# Patient Record
Sex: MALE | Race: WHITE | Employment: FULL TIME | URBAN - METROPOLITAN AREA
[De-identification: names, ages, dates, MRNs, and addresses within clinical notes are randomized per-mention and may not be internally consistent; named-entity substitution may affect disease eponyms.]

---

## 2017-03-06 ENCOUNTER — OFFICE VISIT (OUTPATIENT)
Dept: FAMILY MEDICINE CLINIC | Age: 47
End: 2017-03-06

## 2017-03-06 VITALS
SYSTOLIC BLOOD PRESSURE: 130 MMHG | HEIGHT: 69 IN | DIASTOLIC BLOOD PRESSURE: 88 MMHG | BODY MASS INDEX: 33.03 KG/M2 | TEMPERATURE: 98.8 F | RESPIRATION RATE: 16 BRPM | OXYGEN SATURATION: 98 % | HEART RATE: 78 BPM | WEIGHT: 223 LBS

## 2017-03-06 DIAGNOSIS — E66.9 OBESITY, CLASS I, BMI 30-34.9: ICD-10-CM

## 2017-03-06 DIAGNOSIS — Z00.00 PHYSICAL EXAM: Primary | ICD-10-CM

## 2017-03-06 DIAGNOSIS — R06.09 DYSPNEA ON EXERTION: ICD-10-CM

## 2017-03-06 DIAGNOSIS — R73.9 ELEVATED BLOOD SUGAR: ICD-10-CM

## 2017-03-06 DIAGNOSIS — Z82.49 FAMILY HISTORY OF EARLY CAD: ICD-10-CM

## 2017-03-06 RX ORDER — BETAMETHASONE DIPROPIONATE 0.5 MG/G
CREAM TOPICAL
Refills: 2 | COMMUNITY
Start: 2017-02-23 | End: 2018-04-23

## 2017-03-06 RX ORDER — CLOBETASOL PROPIONATE 0.05 MG/G
GEL TOPICAL
Refills: 2 | COMMUNITY
Start: 2017-02-23 | End: 2018-04-23

## 2017-03-06 NOTE — PROGRESS NOTES
HISTORY OF PRESENT ILLNESS  Jory Ferreira is a 51 y.o. male. Blood pressure 130/88, pulse 78, temperature 98.8 °F (37.1 °C), temperature source Oral, resp. rate 16, height 5' 9\" (1.753 m), weight 223 lb (101.2 kg), SpO2 98 %. Body mass index is 32.93 kg/(m^2). Chief Complaint   Patient presents with    Complete Physical     annual visit      HPI   Jory Ferreira 51 y.o. male  presents to the office today for a complete physical. Bp at office today 130/88 with manual arm cuff recheck. Exertional SOB: Pt notes exertional SOB, occasional chest pain, and increased fatigue. He notes exertional SOB with just walking up the stairs or eating. Pt would like to complete cardiac work up to rule out any abnormality due to his family history of CAD in his father. 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 I have advised pt to complete echo stress test, pulmonary function tests, and cardiac CT scan for further evaluation. Health maintenance: Counseled pt to work on losing weight through diet and exercise. Current Outpatient Prescriptions   Medication Sig Dispense Refill    betamethasone dipropionate (DIPROSONE) 0.05 % topical cream APPLY CREAM TO AFFECTED AREA OF AXILLA 1 TO 2 TIMES DAILY PRN  2    clobetasol (TEMOVATE) 0.05 % topical gel APPLY GEL TO AFFECTED AREA OF SCALP ONCE TO TWICE DAILY PRN  2    atomoxetine (STRATTERA) 40 mg capsule Take 1 Cap by mouth daily.  30 Cap 0     No Known Allergies  Past Medical History:   Diagnosis Date    ADD (attention deficit disorder)     Arthritis     left knee    Preglaucoma of both eyes     Psoriasis 02/2017     Past Surgical History:   Procedure Laterality Date    HX ORTHOPAEDIC       Family History   Problem Relation Age of Onset   Jonny Holms Glaucoma Father     Hypertension Father     Stroke Sister      Social History   Substance Use Topics    Smoking status: Never Smoker    Smokeless tobacco: Never Used    Alcohol use 7.5 oz/week     15 Standard drinks or equivalent per week      Comment: 15 per week        Review of Systems   Constitutional: Positive for malaise/fatigue. Negative for chills, diaphoresis, fever and weight loss. HENT: Negative for congestion, ear discharge, ear pain, hearing loss, nosebleeds, sore throat and tinnitus. Eyes: Negative for blurred vision, double vision, photophobia, pain, discharge and redness. Respiratory: Positive for shortness of breath (exertional). Negative for cough, hemoptysis, sputum production, wheezing and stridor. Cardiovascular: Negative for chest pain, palpitations, orthopnea, claudication, leg swelling and PND. Gastrointestinal: Negative for abdominal pain, blood in stool, constipation, diarrhea, heartburn, melena, nausea and vomiting. Genitourinary: Negative for dysuria, flank pain, frequency, hematuria and urgency. Musculoskeletal: Negative. Negative for back pain, falls, joint pain, myalgias and neck pain. Skin: Negative for itching and rash. Neurological: Negative. Negative for dizziness, tingling, tremors, sensory change, speech change, focal weakness, seizures, loss of consciousness, weakness and headaches. Endo/Heme/Allergies: Negative for environmental allergies and polydipsia. Does not bruise/bleed easily. Psychiatric/Behavioral: Negative for depression, hallucinations, memory loss, substance abuse and suicidal ideas. The patient is not nervous/anxious and does not have insomnia. All other systems reviewed and are negative. Physical Exam   Constitutional: He is oriented to person, place, and time. He appears well-developed and well-nourished. No distress. HENT:   Head: Normocephalic and atraumatic. Right Ear: External ear normal.   Left Ear: External ear normal.   Nose: Nose normal.   Mouth/Throat: Oropharynx is clear and moist. No oropharyngeal exudate. Eyes: Conjunctivae and EOM are normal. Pupils are equal, round, and reactive to light. Right eye exhibits no discharge. Left eye exhibits no discharge. No scleral icterus. Neck: Normal range of motion. Neck supple. No JVD present. Carotid bruit is not present. No tracheal deviation present. No thyromegaly present. Cardiovascular: Normal rate, regular rhythm, normal heart sounds and intact distal pulses. Exam reveals no gallop and no friction rub. No murmur heard. Pulmonary/Chest: Effort normal and breath sounds normal. No stridor. No respiratory distress. He has no wheezes. He has no rales. He exhibits no tenderness. Abdominal: Soft. Bowel sounds are normal. He exhibits no distension and no mass. There is no tenderness. There is no rebound and no guarding. Genitourinary: Rectal exam shows guaiac negative stool. Prostate is enlarged. Genitourinary Comments: Prostate: smooth, symmetrical, no nodules   Musculoskeletal: Normal range of motion. He exhibits no edema or tenderness. Lymphadenopathy:     He has no cervical adenopathy. Neurological: He is alert and oriented to person, place, and time. He has normal reflexes. No cranial nerve deficit. He exhibits normal muscle tone. Coordination normal.   Skin: Skin is warm and dry. No rash noted. He is not diaphoretic. No erythema. No pallor. Psychiatric: He has a normal mood and affect. His behavior is normal. Judgment and thought content normal.   Nursing note and vitals reviewed. ASSESSMENT and PLAN  Keily Walters was seen today for complete physical.    Diagnoses and all orders for this visit:    Physical exam  -     METABOLIC PANEL, COMPREHENSIVE  -     CBC WITH AUTOMATED DIFF  -     TSH 3RD GENERATION  -     T4, FREE  -     VITAMIN D, 25 HYDROXY  -     URINALYSIS W/MICROSCOPIC  -     LIPID CASCADE  -     PSA W/ REFLX FREE PSA    Dyspnea on exertion  -     ECHO TTE STRESS EXERCISE TREADMILL COMP; Future  -     ECHO TTE STRESS EXRCSE COMP W OR WO CONTR; Future  -     PFT DLCO  -     PULMONARY FUNCTION TEST  - Unclear as to cause.  EKG completed today was inconclusive. Will complete stress echo, PFTs, and cardiac CT scan to rule out any abnormality. Obesity, Class I, BMI 30-34.9  I have reviewed/discussed the above normal BMI with the patient. I have recommended the following interventions: dietary management education, guidance, and counseling, dietary needs education, encourage exercise, feeding regime, lifestyle education regarding diet and monitor weight . The plan is as follows: I have counseled this patient on diet and exercise regimens. Family history of early CAD  -     AMB POC EKG ROUTINE W/ 12 LEADS, INTER & REP  -     CT HEART W/O CONT WITH CALCIUM; Future  -     ECHO TTE STRESS EXERCISE TREADMILL COMP; Future  -     ECHO TTE STRESS EXRCSE COMP W OR WO CONTR; Future  - See above       Follow-up Disposition:  Return in about 6 months (around 9/6/2017) for hypertension follow up, hyperlipidemia follow up. Medication risks/benefits/costs/interactions/alternatives discussed with patient. Advised patient to call back or return to office if symptoms worsen/change/persist.  If patient cannot reach us or should anything more severe/urgent arise he/she should proceed directly to the nearest emergency department. Discussed expected course/resolution/complications of diagnosis in detail with patient. Patient given a written after visit summary which includes her diagnoses, current medications and vitals. Patient expressed understanding with the diagnosis and plan. Written by jany Lemus, as dictated by Dasha Pathak M.D.    I have reviewed and agree with the above note and have made corrections where appropriate, Dr. Catracho Mcbride MD

## 2017-03-06 NOTE — MR AVS SNAPSHOT
Visit Information Date & Time Provider Department Dept. Phone Encounter #  
 3/6/2017  3:15 PM Emmanuel Mccormack  [place] [phone] [id number] Follow-up Instructions Return in about 6 months (around 9/6/2017) for hypertension follow up, hyperlipidemia follow up. Upcoming Health Maintenance Date Due DTaP/Tdap/Td series (1 - Tdap) 6/14/1990 COLONOSCOPY 9/8/2021 Allergies as of 3/6/2017  Review Complete On: 3/6/2017 By: Emmanuel Mccormack MD  
 No Known Allergies Current Immunizations  Reviewed on 2/11/2015 Name Date Influenza Vaccine (Quad) PF 10/28/2015 Not reviewed this visit You Were Diagnosed With   
  
 Codes Comments Physical exam    -  Primary ICD-10-CM: Z00.00 ICD-9-CM: V70.9 Dyspnea on exertion     ICD-10-CM: R06.09 
ICD-9-CM: 786.09 Obesity, Class I, BMI 30-34.9     ICD-10-CM: E66.9 ICD-9-CM: 278.00 Family history of early CAD     ICD-10-CM: Z82.49 
ICD-9-CM: V17.3 Vitals BP Pulse Temp Resp Height(growth percentile) Weight(growth percentile) 130/88 78 98.8 °F (37.1 °C) (Oral) 16 5' 9\" (1.753 m) 223 lb (101.2 kg) SpO2 BMI Smoking Status 98% 32.93 kg/m2 Never Smoker Vitals History BMI and BSA Data Body Mass Index Body Surface Area  
 32.93 kg/m 2 2.22 m 2 Preferred Pharmacy Pharmacy Name Phone [address] AT [facility] 91 [phone] Your Updated Medication List  
  
   
This list is accurate as of: 3/6/17  4:36 PM.  Always use your most recent med list.  
  
  
  
  
 atomoxetine 40 mg capsule Commonly known as:  STRATTERA Take 1 Cap by mouth daily. betamethasone dipropionate 0.05 % topical cream  
Commonly known as:  DIPROSONE  
APPLY CREAM TO AFFECTED AREA OF AXILLA 1 TO 2 TIMES DAILY PRN  
  
 clobetasol 0.05 % topical gel Commonly known as:  Caryle Killer APPLY GEL TO AFFECTED AREA OF SCALP ONCE TO TWICE DAILY PRN We Performed the Following AMB POC EKG ROUTINE W/ 12 LEADS, INTER & REP [69968 CPT(R)] CBC WITH AUTOMATED DIFF [25467 CPT(R)] LIPID CASCADE [id number] CPT(R)] METABOLIC PANEL, COMPREHENSIVE [22658 CPT(R)] PFT DLCO [[id number] Custom] PSA W/ REFLX FREE PSA [29905 CPT(R)] PULMONARY FUNCTION TEST [[id number] Custom] T4, FREE [id number] CPT(R)] TSH 3RD GENERATION [28245 CPT(R)] URINALYSIS W/MICROSCOPIC [00466 CPT(R)] VITAMIN D, 25 HYDROXY [id number] CPT(R)] Follow-up Instructions Return in about 6 months (around 9/6/2017) for hypertension follow up, hyperlipidemia follow up. To-Do List   
 03/06/2017 Imaging:  CT HEART W/O CONT WITH CALCIUM   
  
 03/06/2017 ECHO:  ECHO TTE STRESS EXERCISE TREADMILL COMP   
  
 03/06/2017 ECHO:  ECHO TTE STRESS EXRCSE COMP W OR WO CONTR Introducing [facility]! Dear Merlyn Mclean: Thank you for requesting a [employer] account. Our records indicate that you already have an active [employer] account. You can access your account anytime at [email or web address] Did you know that you can access your hospital and ER discharge instructions at any time in [employer]? You can also review all of your test results from your hospital stay or ER visit. Additional Information If you have questions, please visit the Frequently Asked Questions section of the [employer] website at [email or web address] Remember, [employer] is NOT to be used for urgent needs. For medical emergencies, dial 911. Now available from your iPhone and Android! Please provide this summary of care documentation to your next provider. Your primary care clinician is listed as Aneesh Pennington. If you have any questions after today's visit, please call [phone].

## 2017-03-06 NOTE — PROGRESS NOTES
Patient presents in office today for annual cpe     Chief Complaint   Patient presents with    Complete Physical     annual visit     1. Have you been to the ER, urgent care clinic since your last visit? Hospitalized since your last visit? No    2. Have you seen or consulted any other health care providers outside of the [address] [place] [address] since your last visit? Include any pap smears or colon screening.  No     PHQ 2 / 9, over the last two weeks 4/1/2015   Little interest or pleasure in doing things Not at all   Feeling down, depressed or hopeless Not at all   Total Score PHQ 2 0     Vitals:    03/06/17 1506   BP: 138/90   BP 1 Location: Right arm   BP Patient Position: Sitting   Pulse: 78   Resp: 16   Temp: 98.8 °F (37.1 °C)   TempSrc: Oral   SpO2: 98%   Weight: 223 lb (101.2 kg)   Height: 5' 9\" (1.753 m)

## 2017-03-09 ENCOUNTER — TELEPHONE (OUTPATIENT)
Dept: FAMILY MEDICINE CLINIC | Age: 47
End: 2017-03-09

## 2017-03-09 LAB
25(OH)D3+25(OH)D2 SERPL-MCNC: 38.5 NG/ML (ref 30–100)
ALBUMIN SERPL-MCNC: 4.7 G/DL (ref 3.5–5.5)
ALBUMIN/GLOB SERPL: 1.8 {RATIO} (ref 1.1–2.5)
ALP SERPL-CCNC: 57 IU/L (ref 39–117)
ALT SERPL-CCNC: 23 IU/L (ref 0–44)
APPEARANCE UR: CLEAR
AST SERPL-CCNC: 16 IU/L (ref 0–40)
BACTERIA #/AREA URNS HPF: NORMAL /[HPF]
BASOPHILS # BLD AUTO: 0 X10E3/UL (ref 0–0.2)
BASOPHILS NFR BLD AUTO: 1 %
BILIRUB SERPL-MCNC: 0.5 MG/DL (ref 0–1.2)
BILIRUB UR QL STRIP: NEGATIVE
BUN SERPL-MCNC: 27 MG/DL (ref 6–24)
BUN/CREAT SERPL: 23 (ref 9–20)
CALCIUM SERPL-MCNC: 10 MG/DL (ref 8.7–10.2)
CASTS URNS QL MICRO: NORMAL /LPF
CHLORIDE SERPL-SCNC: 104 MMOL/L (ref 96–106)
CHOLEST SERPL-MCNC: 192 MG/DL (ref 100–199)
CO2 SERPL-SCNC: 19 MMOL/L (ref 18–29)
COLOR UR: YELLOW
CREAT SERPL-MCNC: 1.15 MG/DL (ref 0.76–1.27)
EOSINOPHIL # BLD AUTO: 0.1 X10E3/UL (ref 0–0.4)
EOSINOPHIL NFR BLD AUTO: 1 %
EPI CELLS #/AREA URNS HPF: NORMAL /HPF
ERYTHROCYTE [DISTWIDTH] IN BLOOD BY AUTOMATED COUNT: 13.7 % (ref 12.3–15.4)
GLOBULIN SER CALC-MCNC: 2.6 G/DL (ref 1.5–4.5)
GLUCOSE SERPL-MCNC: 126 MG/DL (ref 65–99)
GLUCOSE UR QL: NEGATIVE
HCT VFR BLD AUTO: 48 % (ref 37.5–51)
HDL SERPL-SCNC: 36.6 UMOL/L
HDLC SERPL-MCNC: 42 MG/DL
HGB BLD-MCNC: 16.2 G/DL (ref 12.6–17.7)
HGB UR QL STRIP: NEGATIVE
IMM GRANULOCYTES # BLD: 0 X10E3/UL (ref 0–0.1)
IMM GRANULOCYTES NFR BLD: 0 %
INTERPRETATION, 910389: NORMAL
KETONES UR QL STRIP: NEGATIVE
LDL SERPL QN: 19.8 NM
LDL SERPL-SCNC: 1623 NMOL/L
LDL SMALL SERPL-SCNC: 1088 NMOL/L
LDLC SERPL CALC-MCNC: 104 MG/DL (ref 0–99)
LDLC/HDLC SERPL: 2.5 RATIO UNITS (ref 0–3.6)
LEUKOCYTE ESTERASE UR QL STRIP: NEGATIVE
LP-IR SCORE SERPL: 59
LYMPHOCYTES # BLD AUTO: 1.6 X10E3/UL (ref 0.7–3.1)
LYMPHOCYTES NFR BLD AUTO: 31 %
MCH RBC QN AUTO: 29.7 PG (ref 26.6–33)
MCHC RBC AUTO-ENTMCNC: 33.8 G/DL (ref 31.5–35.7)
MCV RBC AUTO: 88 FL (ref 79–97)
MICRO URNS: NORMAL
MICRO URNS: NORMAL
MONOCYTES # BLD AUTO: 0.5 X10E3/UL (ref 0.1–0.9)
MONOCYTES NFR BLD AUTO: 10 %
MUCOUS THREADS URNS QL MICRO: PRESENT
NEUTROPHILS # BLD AUTO: 2.9 X10E3/UL (ref 1.4–7)
NEUTROPHILS NFR BLD AUTO: 57 %
NITRITE UR QL STRIP: NEGATIVE
NONHDLC SERPL-MCNC: 150 MG/DL (ref 0–129)
PH UR STRIP: 6.5 [PH] (ref 5–7.5)
PLATELET # BLD AUTO: 194 X10E3/UL (ref 150–379)
POTASSIUM SERPL-SCNC: 4.2 MMOL/L (ref 3.5–5.2)
PROT SERPL-MCNC: 7.3 G/DL (ref 6–8.5)
PROT UR QL STRIP: NEGATIVE
PSA SERPL-MCNC: 1.1 NG/ML (ref 0–4)
RBC # BLD AUTO: 5.45 X10E6/UL (ref 4.14–5.8)
RBC #/AREA URNS HPF: NORMAL /HPF
REFLEX CRITERIA: NORMAL
SODIUM SERPL-SCNC: 146 MMOL/L (ref 134–144)
SP GR UR: 1.02 (ref 1–1.03)
T4 FREE SERPL-MCNC: 1.36 NG/DL (ref 0.82–1.77)
TRIGL SERPL-MCNC: 228 MG/DL (ref 0–149)
TSH SERPL DL<=0.005 MIU/L-ACNC: 1.08 UIU/ML (ref 0.45–4.5)
UROBILINOGEN UR STRIP-MCNC: 0.2 MG/DL (ref 0.2–1)
WBC # BLD AUTO: 5.1 X10E3/UL (ref 3.4–10.8)
WBC #/AREA URNS HPF: NORMAL /HPF

## 2017-03-09 NOTE — TELEPHONE ENCOUNTER
Sandra Shelby from [facility] [phone] CT heart Without was denied by his  per Sandra Shelby they will cover Stress test. Per Christi patient is aware I advised her I will call her back if you want to change it to Stress test

## 2017-03-10 ENCOUNTER — HOSPITAL ENCOUNTER (OUTPATIENT)
Dept: CT IMAGING | Age: 47
Discharge: HOME | End: 2017-03-10
Attending: FAMILY MEDICINE
Payer: SELF-PAY

## 2017-03-10 DIAGNOSIS — Z82.49 FAMILY HISTORY OF EARLY CAD: ICD-10-CM

## 2017-03-10 PROCEDURE — 75571 CT HRT W/O DYE W/CA TEST: CPT

## 2017-03-11 NOTE — PROGRESS NOTES
The coronary calcium in each vessel is as follows:    Left main coronary artery: 0  Left anterior descending coronary artery: 0  Left circumflex coronary artery: 0  Right coronary artery: 0  Posterior descending coronary artery: 0    Total calcium score: 0     Calcium score interpretation:  0-0 = No evidence of CAD  1-10 = Minimal evidence of CAD   = Mild evidence of CAD  101-400 = Moderate evidence of CAD  >400 = Extensive evidence of CAD    Your score of 0 places you in the 0 percentile rank. That means that nearly 100%  of men from 55to 48years old will have a higher calcium score than you. Chest CT findings: The visualized lungs are clear. The entire lung fields are  not imaged on this examination.             Impression            IMPRESSION: Total calcium score of 0.  No evidence of coronary artery disease

## 2017-03-13 ENCOUNTER — HOSPITAL ENCOUNTER (OUTPATIENT)
Dept: PULMONOLOGY | Age: 47
Discharge: HOME | End: 2017-03-13
Attending: FAMILY MEDICINE
Payer: COMMERCIAL

## 2017-03-13 DIAGNOSIS — R06.09 DYSPNEA ON EXERTION: ICD-10-CM

## 2017-03-13 PROCEDURE — 94729 DIFFUSING CAPACITY: CPT

## 2017-03-13 PROCEDURE — 94010 BREATHING CAPACITY TEST: CPT

## 2017-03-13 NOTE — CARDIO/PULMONARY
Reached patient at his given mobile number. Patient has received his coronary artery CT score of zero from Dr. Gali Garcia office. Patient had a few questions about the score which we discussed. Patient has no further questions at this time.

## 2017-03-16 ENCOUNTER — TELEPHONE (OUTPATIENT)
Dept: FAMILY MEDICINE CLINIC | Age: 47
End: 2017-03-16

## 2017-03-16 NOTE — PROGRESS NOTES
Mr. Arsen Ware look relatively good. Brody Spies Brody Spies Brody Spies But  Triglycerides, need to be less than 150,  i would work on diet and exercise. Fish oil tabs 1000 mg daily, and weight loss will help with this. 2) Mucus in urine is not a an issue when nitrates and leucocytes negative. ( this indicates infection)    3) coronary calcium score are is normal, this basically looks at hard calcium not soft calcium deposition. 4) LDL particle size is slightly elevated, needs to be less than 1300, again you need to make diet and exercise a priority. 5) I will order a regular stress test, lets see if this gets approved if, not we will need to send you to cardiology.     Any other questions let me know    Regards  H

## 2017-03-16 NOTE — TELEPHONE ENCOUNTER
Best call back # for patient: [phone] attempted to call patient no answer left him on his private VM that his labs are in my chart he can see Dr medina recommendation and if he has question to call us back

## 2017-03-16 NOTE — PROGRESS NOTES
Best call back # for patient: [phone] attempted to call patient no answer left him on his private VM that his labs are in my chart he can see Dr medina recommendation and if he has question to call us back

## 2017-03-16 NOTE — TELEPHONE ENCOUNTER
Patient is calling in regards to his most recent lab draw, patient would like a call back as soon as possible regarding these results.     Best call back # for patient: [phone]

## 2017-03-18 NOTE — PROCEDURES
[address], [address] [place] [address]   PULMONARY FUNCTION       Name:  Roly Ritter   MR#:  [id number]   :  1970   Account #:  [de-identified]    Date of Procedure:  2017   Date of Adm:  2017       INDICATIONS: Shortness of breath. FINDINGS: Spirometry: Normal FVC, normal FEV1, normal FEV1/FVC   ratio. Lung volume measurement: Normal TLC, normal VC, normal RV,   normal RV/TLC. Diffusion capacity: Normal diffusion capacity. IMPRESSION: Normal spirometry, lung volumes and diffusion capacity.         MD Gomez Negron / Charisse Hernández   D:  2017   16:20   T:  2017   21:39   Job #:  [id number]

## 2017-03-20 ENCOUNTER — TELEPHONE (OUTPATIENT)
Dept: FAMILY MEDICINE CLINIC | Age: 47
End: 2017-03-20

## 2017-03-20 DIAGNOSIS — R06.00 DYSPNEA, UNSPECIFIED TYPE: Primary | ICD-10-CM

## 2017-03-21 NOTE — TELEPHONE ENCOUNTER
uche called me back Nuclear stress test wont be covered I advised Travis Madden just need regular stress test and reorder stress test cardiac per Travis Madden she will call patient and set up appointment with patient

## 2017-03-22 NOTE — PROGRESS NOTES
[phone] attempted to call patient no answer left message on his private VM that PFT is normal and he can see his results in my chart and to call us back if he needs anything

## 2017-03-23 LAB
EST. AVERAGE GLUCOSE BLD GHB EST-MCNC: 105 MG/DL
HBA1C MFR BLD: 5.3 % (ref 4.8–5.6)

## 2018-04-23 ENCOUNTER — OFFICE VISIT (OUTPATIENT)
Dept: FAMILY MEDICINE CLINIC | Age: 48
End: 2018-04-23

## 2018-04-23 VITALS
HEART RATE: 80 BPM | TEMPERATURE: 97.4 F | DIASTOLIC BLOOD PRESSURE: 82 MMHG | RESPIRATION RATE: 18 BRPM | BODY MASS INDEX: 31.99 KG/M2 | HEIGHT: 69 IN | WEIGHT: 216 LBS | OXYGEN SATURATION: 96 % | SYSTOLIC BLOOD PRESSURE: 124 MMHG

## 2018-04-23 DIAGNOSIS — F90.8 ATTENTION DEFICIT HYPERACTIVITY DISORDER (ADHD), OTHER TYPE: ICD-10-CM

## 2018-04-23 DIAGNOSIS — E66.9 OBESITY, CLASS I, BMI 30-34.9: ICD-10-CM

## 2018-04-23 DIAGNOSIS — Z00.00 PHYSICAL EXAM: Primary | ICD-10-CM

## 2018-04-23 DIAGNOSIS — M25.50 ARTHRALGIA, UNSPECIFIED JOINT: ICD-10-CM

## 2018-04-23 DIAGNOSIS — L40.9 PSORIASIS: ICD-10-CM

## 2018-04-23 DIAGNOSIS — R06.09 DYSPNEA ON EXERTION: ICD-10-CM

## 2018-04-23 DIAGNOSIS — Z82.49 FAMILY HISTORY OF PREMATURE CAD: ICD-10-CM

## 2018-04-23 RX ORDER — GLUCOSAM/CHONDRO/HERB 149/HYAL 750-100 MG
1 TABLET ORAL DAILY
COMMUNITY
End: 2020-11-29

## 2018-04-23 RX ORDER — ZINC GLUCONATE 10 MG
LOZENGE ORAL DAILY
COMMUNITY

## 2018-04-23 RX ORDER — CHOLECALCIFEROL (VITAMIN D3) 125 MCG
2000 CAPSULE ORAL
COMMUNITY

## 2018-04-23 NOTE — PATIENT INSTRUCTIONS

## 2018-04-23 NOTE — PROGRESS NOTES
HISTORY OF PRESENT ILLNESS  Bret Mathews is a 49 y.o. male. Blood pressure 124/82, pulse 80, temperature 97.4 °F (36.3 °C), temperature source Oral, resp. rate 18, height 5' 9\" (1.753 m), weight 216 lb (98 kg), SpO2 96 %. Body mass index is 31.9 kg/(m^2). Chief Complaint   Patient presents with    Complete Physical        HPI  Bret Mathews 49 y.o. male  presents to the office today for a complete physical.    Vitamin D deficiency: Pt's vitamin D levels were 38.5 on 03/08/17. Pt continues with Vitamin D 2,000units daily. Presumed stable, will assess levels today. Health maintenance: Pt states that he stopped exercising and dieting regularly about 3 months ago, but plans to start a new regimen as the weather warms up. He notes that he has low energy at all times and states that it makes it more difficult to exercise regularly. Pt would like to have a stress test done to rule out any cardiac issues. Will order a 2D echo to rule out any cardiac issues due to pt's family history of heart disease. Pt states that he is having bilateral elbow pain. His dermatologist diagnosed him with psoriasis, but has not started him on any treatment. Will check rheumatologic markers on the pt today to evaluate if he needs to see a rheumatologist.     Current Outpatient Prescriptions   Medication Sig Dispense Refill    cholecalciferol, vitamin D3, (VITAMIN D3) 2,000 unit tab Take  by mouth.  omega 3-DHA-EPA-fish oil 1,000 mg (120 mg-180 mg) capsule Take 1 Cap by mouth daily.  magnesium 250 mg tab Take  by mouth daily.  betamethasone dipropionate (DIPROSONE) 0.05 % topical cream APPLY CREAM TO AFFECTED AREA OF AXILLA 1 TO 2 TIMES DAILY PRN  2    clobetasol (TEMOVATE) 0.05 % topical gel APPLY GEL TO AFFECTED AREA OF SCALP ONCE TO TWICE DAILY PRN  2    atomoxetine (STRATTERA) 40 mg capsule Take 1 Cap by mouth daily.  30 Cap 0     No Known Allergies  Past Medical History:   Diagnosis Date    ADD (attention deficit disorder)     Arthritis     left knee    Preglaucoma of both eyes     Psoriasis 02/2017     Past Surgical History:   Procedure Laterality Date    HX ORTHOPAEDIC       Family History   Problem Relation Age of Onset   Manas Prior Glaucoma Father     Hypertension Father     Stroke Sister      Social History   Substance Use Topics    Smoking status: Never Smoker    Smokeless tobacco: Never Used    Alcohol use 7.5 oz/week     15 Standard drinks or equivalent per week      Comment: 2-3 drinks per day        Review of Systems   Constitutional: Positive for malaise/fatigue. Negative for chills, diaphoresis, fever and weight loss. HENT: Negative for congestion, ear discharge, ear pain, hearing loss, nosebleeds, sore throat and tinnitus. Eyes: Negative for blurred vision, double vision, photophobia, pain, discharge and redness. Respiratory: Negative for cough, hemoptysis, sputum production, shortness of breath, wheezing and stridor. Cardiovascular: Negative for chest pain, palpitations, orthopnea, claudication, leg swelling and PND. Gastrointestinal: Negative for abdominal pain, blood in stool, constipation, diarrhea, heartburn, melena, nausea and vomiting. Genitourinary: Negative for dysuria, flank pain, frequency, hematuria and urgency. Musculoskeletal: Negative for back pain, falls, joint pain, myalgias and neck pain. Skin: Negative for itching and rash. Neurological: Negative for dizziness, tingling, tremors, sensory change, speech change, focal weakness, seizures, loss of consciousness, weakness and headaches. Endo/Heme/Allergies: Negative for environmental allergies and polydipsia. Does not bruise/bleed easily. Psychiatric/Behavioral: Negative for depression, hallucinations, memory loss, substance abuse and suicidal ideas. The patient is not nervous/anxious and does not have insomnia. All other systems reviewed and are negative.       Physical Exam   Constitutional: He is oriented to person, place, and time. He appears well-developed and well-nourished. No distress. HENT:   Head: Normocephalic and atraumatic. Right Ear: External ear normal.   Left Ear: External ear normal.   Nose: Nose normal.   Mouth/Throat: Oropharynx is clear and moist. No oropharyngeal exudate. Eyes: Conjunctivae and EOM are normal. Pupils are equal, round, and reactive to light. Right eye exhibits no discharge. Left eye exhibits no discharge. No scleral icterus. Neck: Normal range of motion. Neck supple. No JVD present. No tracheal deviation present. No thyromegaly present. Cardiovascular: Normal rate, regular rhythm, normal heart sounds and intact distal pulses. Exam reveals no gallop and no friction rub. No murmur heard. Pulmonary/Chest: Effort normal and breath sounds normal. No stridor. He has no wheezes. He has no rales. He exhibits no tenderness. Abdominal: Soft. Bowel sounds are normal. He exhibits no distension and no mass. There is no tenderness. There is no rebound and no guarding. Genitourinary:   Genitourinary Comments: Prostate: enlarged, smooth, symmetrical, no nodules, guaiac negative. Musculoskeletal: Normal range of motion. He exhibits no edema or tenderness. Lymphadenopathy:     He has no cervical adenopathy. Neurological: He is alert and oriented to person, place, and time. He has normal reflexes. No cranial nerve deficit. He exhibits normal muscle tone. Coordination normal.   Skin: Skin is warm and dry. No rash noted. He is not diaphoretic. No erythema. No pallor. Psychiatric: He has a normal mood and affect. His behavior is normal. Judgment and thought content normal.   Nursing note and vitals reviewed. ASSESSMENT and PLAN  Diagnoses and all orders for this visit:    1.  Physical exam  -     CBC WITH AUTOMATED DIFF  -     METABOLIC PANEL, COMPREHENSIVE  -     LIPID PANEL  -     TSH 3RD GENERATION  -     T4, FREE  -     VITAMIN D, 25 HYDROXY  -     URINALYSIS W/MICROSCOPIC  -     PSA W/ REFLX FREE PSA  -     TESTOSTERONE, FREE & TOTAL    2. Family history of premature CAD        - Will order a 2D echo to rule out any cardiac issues due to pt's family history of heart disease. 3. Obesity, Class I, BMI 30-34.9        - I have reviewed/discussed the above normal BMI with the patient. I have recommended the following interventions: dietary management education, guidance, and counseling, encourage exercise and monitor weight . 4. Dyspnea on exertion  -     ECHO TTE STRESS EXERCISE TREADMILL COMP; Future  -     ECHO TTE STRESS EXRCSE COMP W OR WO CONTR; Future  - Will order a 2D echo to rule out any cardiac issues due to pt's family history of heart disease. 5. Psoriasis        - Advised pt to continue to follow up with his dermatologist.     6. Arthralgia, unspecified joint  -     RHEUMATOID FACTOR, QL  -     CYCLIC CITRUL PEPTIDE AB, IGG  -     SED RATE (ESR)  -     C REACTIVE PROTEIN, QT        - Will check rheumatologic markers on the pt today to evaluate if he needs to see a rheumatologist.     Follow-up Disposition:  Return in about 1 year (around 4/23/2019) for physical exam.   Medication risks/benefits/costs/interactions/alternatives discussed with patient. Advised patient to call back or return to office if symptoms worsen/change/persist.  If patient cannot reach us or should anything more severe/urgent arise he/she should proceed directly to the nearest emergency department. Discussed expected course/resolution/complications of diagnosis in detail with patient. Patient given a written after visit summary which includes her diagnoses, current medications and vitals. Patient expressed understanding with the diagnosis and plan. Written by jany Beltre, as dictated by Wyatt Hernandez M.D.   I have reviewed and agree with the above note and have made corrections where appropriate, Dr. Gerard Tyler MD

## 2018-04-23 NOTE — PROGRESS NOTES
Chief Complaint   Patient presents with    Complete Physical       Reviewed Record in preparation for visit and have obtained necessary documentation. Identified pt with two pt identifiers (Name @ )    Health Maintenance Due   Topic    DTaP/Tdap/Td series (1 - Tdap)    Influenza Age 5 to Adult          1. Have you been to the ER, urgent care clinic since your last visit? Hospitalized since your last visit? No    2. Have you seen or consulted any other health care providers outside of the [facility] since your last visit? Include any pap smears or colon screening.  No

## 2018-04-23 NOTE — MR AVS SNAPSHOT
[facility] 
 
 
 [address] 
[phone] Patient: Lauri Moyer MRN: [id number] NSO:9[birth year]80/[id number] Visit Information Date & Time Provider Department Dept. Phone Encounter #  
 4/23/2018  3:15 PM Robert Vargas  [facility] [phone] [id number] Follow-up Instructions Return in about 1 year (around 4/23/2019) for physical exam. Upcoming Health Maintenance Date Due DTaP/Tdap/Td series (1 - Tdap) 6/14/1990 Influenza Age 5 to Adult 8/1/2017 COLONOSCOPY 9/8/2021 Allergies as of 4/23/2018  Review Complete On: 4/23/2018 By: Robert Vargas MD  
 No Known Allergies Current Immunizations  Reviewed on 2/11/2015 Name Date Influenza Vaccine (Quad) PF 10/28/2015 Not reviewed this visit You Were Diagnosed With   
  
 Codes Comments Physical exam    -  Primary ICD-10-CM: Z00.00 ICD-9-CM: V70.9 Family history of premature CAD     ICD-10-CM: Z82.49 
ICD-9-CM: V17.3 Obesity, Class I, BMI 30-34.9     ICD-10-CM: E66.9 ICD-9-CM: 278.00 Dyspnea on exertion     ICD-10-CM: R06.09 
ICD-9-CM: 786.09   
 Psoriasis     ICD-10-CM: L40.9 ICD-9-CM: 696.1 Arthralgia, unspecified joint     ICD-10-CM: M25.50 ICD-9-CM: 719.40 Vitals BP Pulse Temp Resp Height(growth percentile) Weight(growth percentile) 124/82 (BP 1 Location: Right arm, BP Patient Position: Sitting) 80 97.4 °F (36.3 °C) (Oral) 18 5' 9\" (1.753 m) 216 lb (98 kg) SpO2 BMI Smoking Status 96% 31.9 kg/m2 Never Smoker Vitals History BMI and BSA Data Body Mass Index Body Surface Area 31.9 kg/m 2 2.18 m 2 Preferred Pharmacy Pharmacy Name Phone [address], [address] [place] [address] [phone] Your Updated Medication List  
  
   
 This list is accurate as of 4/23/18  4:28 PM.  Always use your most recent med list.  
  
  
  
  
 atomoxetine 40 mg capsule Commonly known as:  STRATTERA Take 1 Cap by mouth daily. betamethasone dipropionate 0.05 % topical cream  
Commonly known as:  DIPROSONE  
APPLY CREAM TO AFFECTED AREA OF AXILLA 1 TO 2 TIMES DAILY PRN  
  
 clobetasol 0.05 % topical gel Commonly known as:  TEMOVATE  
APPLY GEL TO AFFECTED AREA OF SCALP ONCE TO TWICE DAILY PRN  
  
 magnesium 250 mg Tab Take  by mouth daily. omega 3-DHA-EPA-fish oil 1,000 mg (120 mg-180 mg) capsule Take 1 Cap by mouth daily. VITAMIN D3 2,000 unit Tab Generic drug:  cholecalciferol (vitamin D3) Take  by mouth. We Performed the Following C REACTIVE PROTEIN, QT [21546 CPT(R)] CBC WITH AUTOMATED DIFF [15936 CPT(R)] [address], IGG [id number] CPT(R)] LIPID PANEL [99652 CPT(R)] METABOLIC PANEL, COMPREHENSIVE [26922 CPT(R)] PSA W/ REFLX FREE PSA [42050 CPT(R)] RHEUMATOID FACTOR, QL [id number] CPT(R)] SED RATE (ESR) B884172 CPT(R)] T4, FREE [id number] CPT(R)] TESTOSTERONE, FREE & TOTAL [03346 CPT(R)] TSH 3RD GENERATION [06166 CPT(R)] URINALYSIS W/MICROSCOPIC [41941 CPT(R)] VITAMIN D, 25 HYDROXY [id number] CPT(R)] Follow-up Instructions Return in about 1 year (around 4/23/2019) for physical exam. To-Do List   
 04/23/2018 ECHO:  ECHO TTE STRESS EXERCISE TREADMILL COMP   
  
 04/23/2018 ECHO:  ECHO TTE STRESS EXRCSE COMP W OR WO CONTR Patient Instructions Well Visit, Ages 25 to 48: Care Instructions Your Care Instructions Physical exams can help you stay healthy. Your doctor has checked your overall health and may have suggested ways to take good care of yourself. He or she also may have recommended tests. At home, you can help prevent illness with healthy eating, regular exercise, and other steps. Follow-up care is a key part of your treatment and safety. Be sure to make and go to all appointments, and call your doctor if you are having problems. It's also a good idea to know your test results and keep a list of the medicines you take. How can you care for yourself at home? · Reach and stay at a healthy weight. This will lower your risk for many problems, such as obesity, diabetes, heart disease, and high blood pressure. · Get at least 30 minutes of physical activity on most days of the week. Walking is a good choice. You also may want to do other activities, such as running, swimming, cycling, or playing tennis or team sports. Discuss any changes in your exercise program with your doctor. · Do not smoke or allow others to smoke around you. If you need help quitting, talk to your doctor about stop-smoking programs and medicines. These can increase your chances of quitting for good. · Talk to your doctor about whether you have any risk factors for sexually transmitted infections (STIs). Having one sex partner (who does not have STIs and does not have sex with anyone else) is a good way to avoid these infections. · Use birth control if you do not want to have children at this time. Talk with your doctor about the choices available and what might be best for you. · Protect your skin from too much sun. When you're outdoors from 10 a.m. to 4 p.m., stay in the shade or cover up with clothing and a hat with a wide brim. Wear sunglasses that block UV rays. Even when it's cloudy, put broad-spectrum sunscreen (SPF 30 or higher) on any exposed skin. · See a dentist one or two times a year for checkups and to have your teeth cleaned. · Wear a seat belt in the car. · Drink alcohol in moderation, if at all. That means no more than 2 drinks a day for men and 1 drink a day for women. Follow your doctor's advice about when to have certain tests. These tests can spot problems early. For everyone · Cholesterol. Have the fat (cholesterol) in your blood tested after age 21. Your doctor will tell you how often to have this done based on your age, family history, or other things that can increase your risk for heart disease. · Blood pressure. Have your blood pressure checked during a routine doctor visit. Your doctor will tell you how often to check your blood pressure based on your age, your blood pressure results, and other factors. · Vision. Talk with your doctor about how often to have a glaucoma test. 
· Diabetes. Ask your doctor whether you should have tests for diabetes. · Colon cancer. Have a test for colon cancer at age 48. You may have one of several tests. If you are younger than 48, you may need a test earlier if you have any risk factors. Risk factors include whether you already had a precancerous polyp removed from your colon or whether your parent, brother, sister, or child has had colon cancer. For women · Breast exam and mammogram. Talk to your doctor about when you should have a clinical breast exam and a mammogram. Medical experts differ on whether and how often women under 50 should have these tests. Your doctor can help you decide what is right for you. · Pap test and pelvic exam. Begin Pap tests at age 24. A Pap test is the best way to find cervical cancer. The test often is part of a pelvic exam. Ask how often to have this test. 
· Tests for sexually transmitted infections (STIs). Ask whether you should have tests for STIs. You may be at risk if you have sex with more than one person, especially if your partners do not wear condoms. For men · Tests for sexually transmitted infections (STIs). Ask whether you should have tests for STIs. You may be at risk if you have sex with more than one person, especially if you do not wear a condom. · Testicular cancer exam. Ask your doctor whether you should check your testicles regularly. · Prostate exam. Talk to your doctor about whether you should have a blood test (called a PSA test) for prostate cancer. Experts differ on whether and when men should have this test. Some experts suggest it if you are older than 39 and are -American or have a father or brother who got prostate cancer when he was younger than 72. When should you call for help? Watch closely for changes in your health, and be sure to contact your doctor if you have any problems or symptoms that concern you. Where can you learn more? Go to http://catalina-zahraa.info/. Enter P072 in the search box to learn more about \"Well Visit, Ages 25 to 48: Care Instructions. \" Current as of: May 12, 2017 Content Version: 11.4 © 4656-9385 [email or web address]. Care instructions adapted under license by [employer] (which disclaims liability or warranty for this information). If you have questions about a medical condition or this instruction, always ask your healthcare professional. [place] [address] any warranty or liability for your use of this information. Introducing [facility]! Dear Brandi Veronica: Thank you for requesting a [employer] account. Our records indicate that you already have an active [employer] account. You can access your account anytime at [email or web address] Did you know that you can access your hospital and ER discharge instructions at any time in [employer]? You can also review all of your test results from your hospital stay or ER visit. Additional Information If you have questions, please visit the Frequently Asked Questions section of the [employer] website at [email or web address] Remember, [employer] is NOT to be used for urgent needs. For medical emergencies, dial 911. Now available from your iPhone and Android! Please provide this summary of care documentation to your next provider. Your primary care clinician is listed as Vanessa Bernard. If you have any questions after today's visit, please call [phone].

## 2018-05-11 ENCOUNTER — TELEPHONE (OUTPATIENT)
Dept: FAMILY MEDICINE CLINIC | Age: 48
End: 2018-05-11

## 2018-05-11 ENCOUNTER — HOSPITAL ENCOUNTER (OUTPATIENT)
Dept: NON INVASIVE DIAGNOSTICS | Age: 48
Discharge: HOME | End: 2018-05-11
Attending: FAMILY MEDICINE
Payer: COMMERCIAL

## 2018-05-11 DIAGNOSIS — R06.09 DYSPNEA ON EXERTION: ICD-10-CM

## 2018-05-11 LAB
ATTENDING PHYSICIAN, CST07: NORMAL
DIAGNOSIS, 93000: NORMAL
DUKE TM SCORE RESULT, CST14: NORMAL
DUKE TREADMILL SCORE, CST13: NORMAL
ECG INTERP BEFORE EX, CST11: NORMAL
ECG INTERP DURING EX, CST12: NORMAL
FUNCTIONAL CAPACITY, CST17: NORMAL
KNOWN CARDIAC CONDITION, CST08: NORMAL
MAX. DIASTOLIC BP, CST04: 78 MMHG
MAX. HEART RATE, CST05: 157 BPM
MAX. SYSTOLIC BP, CST03: 172 MMHG
OVERALL BP RESPONSE TO EXERCISE, CST16: NORMAL
OVERALL HR RESPONSE TO EXERCISE, CST15: NORMAL
PEAK EX METS, CST10: 13.4 METS
PROTOCOL NAME, CST01: NORMAL
TEST INDICATION, CST09: NORMAL

## 2018-05-11 PROCEDURE — 93351 STRESS TTE COMPLETE: CPT

## 2018-05-11 NOTE — TELEPHONE ENCOUNTER
Patient called had CPE 4/23/2018 and wanted to make sure for his labs hemoglobin a1c was included per Dr. Jocelyn leo to order A1c and add to lab order patient aware and understand

## 2018-05-11 NOTE — PROGRESS NOTES
Diagnosis    EF 60% Normal   Normal stress echo     Confirmed by Teresa Lua MD. ([id number]),  Nico Osuna ([id number]) on   5/11/2018 11:25:19 AM         Test indication  Dyspnea with Exercise    Functional capacity  Normal    ECG Interp. Before Exercise  Normal    ECG Interp. During Exercise  none    Overall HR response to exercise  Target Heart Rate:146    Overall BP response to exercise  normal resting BP - appropriate response    Max. Systolic BP mmHg 855    Max. Diastolic BP mmHg 78    Max.  Heart rate     Han treadmill score      Han TM score result      Peak Ex METs METS 13.4    Protocol name  BINH     Known cardiac condition      Attending physician  SILVANA MOSQUEDA    Resulting Agency  [facility] [place] [facility]

## 2018-05-24 LAB
APPEARANCE UR: CLEAR
BACTERIA #/AREA URNS HPF: NORMAL /[HPF]
BILIRUB UR QL STRIP: NEGATIVE
CASTS URNS QL MICRO: NORMAL /LPF
COLOR UR: YELLOW
EPI CELLS #/AREA URNS HPF: NORMAL /HPF
GLUCOSE UR QL: NEGATIVE
HGB UR QL STRIP: NEGATIVE
KETONES UR QL STRIP: NEGATIVE
LEUKOCYTE ESTERASE UR QL STRIP: NEGATIVE
MICRO URNS: NORMAL
MICRO URNS: NORMAL
MUCOUS THREADS URNS QL MICRO: PRESENT
NITRITE UR QL STRIP: NEGATIVE
PH UR STRIP: 5.5 [PH] (ref 5–7.5)
PROT UR QL STRIP: NEGATIVE
RBC #/AREA URNS HPF: NORMAL /HPF
SP GR UR: 1.02 (ref 1–1.03)
UROBILINOGEN UR STRIP-MCNC: 0.2 MG/DL (ref 0.2–1)
WBC #/AREA URNS HPF: NORMAL /HPF

## 2018-05-26 LAB
25(OH)D3+25(OH)D2 SERPL-MCNC: 41 NG/ML (ref 30–100)
ALBUMIN SERPL-MCNC: 4.7 G/DL (ref 3.5–5.5)
ALBUMIN/GLOB SERPL: 2.2 {RATIO} (ref 1.2–2.2)
ALP SERPL-CCNC: 61 IU/L (ref 39–117)
ALT SERPL-CCNC: 38 IU/L (ref 0–44)
AST SERPL-CCNC: 25 IU/L (ref 0–40)
BASOPHILS # BLD AUTO: 0 X10E3/UL (ref 0–0.2)
BASOPHILS NFR BLD AUTO: 0 %
BILIRUB SERPL-MCNC: 0.8 MG/DL (ref 0–1.2)
BUN SERPL-MCNC: 24 MG/DL (ref 6–24)
BUN/CREAT SERPL: 25 (ref 9–20)
CALCIUM SERPL-MCNC: 9.3 MG/DL (ref 8.7–10.2)
CCP IGA+IGG SERPL IA-ACNC: 6 UNITS (ref 0–19)
CHLORIDE SERPL-SCNC: 103 MMOL/L (ref 96–106)
CHOLEST SERPL-MCNC: 226 MG/DL (ref 100–199)
CO2 SERPL-SCNC: 21 MMOL/L (ref 18–29)
CREAT SERPL-MCNC: 0.96 MG/DL (ref 0.76–1.27)
CRP SERPL-MCNC: 1.8 MG/L (ref 0–4.9)
EOSINOPHIL # BLD AUTO: 0.1 X10E3/UL (ref 0–0.4)
EOSINOPHIL NFR BLD AUTO: 2 %
ERYTHROCYTE [DISTWIDTH] IN BLOOD BY AUTOMATED COUNT: 13.6 % (ref 12.3–15.4)
ERYTHROCYTE [SEDIMENTATION RATE] IN BLOOD BY WESTERGREN METHOD: 4 MM/HR (ref 0–15)
EST. AVERAGE GLUCOSE BLD GHB EST-MCNC: 108 MG/DL
GFR SERPLBLD CREATININE-BSD FMLA CKD-EPI: 108 ML/MIN/1.73
GFR SERPLBLD CREATININE-BSD FMLA CKD-EPI: 93 ML/MIN/1.73
GLOBULIN SER CALC-MCNC: 2.1 G/DL (ref 1.5–4.5)
GLUCOSE SERPL-MCNC: 108 MG/DL (ref 65–99)
HBA1C MFR BLD: 5.4 % (ref 4.8–5.6)
HCT VFR BLD AUTO: 47.6 % (ref 37.5–51)
HDLC SERPL-MCNC: 49 MG/DL
HGB BLD-MCNC: 16.2 G/DL (ref 13–17.7)
IMM GRANULOCYTES # BLD: 0 X10E3/UL (ref 0–0.1)
IMM GRANULOCYTES NFR BLD: 0 %
INTERPRETATION, 910389: NORMAL
LDLC SERPL CALC-MCNC: 152 MG/DL (ref 0–99)
LYMPHOCYTES # BLD AUTO: 1.6 X10E3/UL (ref 0.7–3.1)
LYMPHOCYTES NFR BLD AUTO: 38 %
MCH RBC QN AUTO: 29.7 PG (ref 26.6–33)
MCHC RBC AUTO-ENTMCNC: 34 G/DL (ref 31.5–35.7)
MCV RBC AUTO: 87 FL (ref 79–97)
MONOCYTES # BLD AUTO: 0.3 X10E3/UL (ref 0.1–0.9)
MONOCYTES NFR BLD AUTO: 7 %
NEUTROPHILS # BLD AUTO: 2.3 X10E3/UL (ref 1.4–7)
NEUTROPHILS NFR BLD AUTO: 53 %
PLATELET # BLD AUTO: 178 X10E3/UL (ref 150–379)
POTASSIUM SERPL-SCNC: 4.1 MMOL/L (ref 3.5–5.2)
PROT SERPL-MCNC: 6.8 G/DL (ref 6–8.5)
PSA SERPL-MCNC: 1.4 NG/ML (ref 0–4)
RBC # BLD AUTO: 5.46 X10E6/UL (ref 4.14–5.8)
REFLEX CRITERIA: NORMAL
RHEUMATOID FACT SERPL-ACNC: <10 IU/ML (ref 0–13.9)
SODIUM SERPL-SCNC: 143 MMOL/L (ref 134–144)
T4 FREE SERPL-MCNC: 1.33 NG/DL (ref 0.82–1.77)
TESTOST FREE SERPL-MCNC: 11 PG/ML (ref 6.8–21.5)
TESTOST SERPL-MCNC: 476 NG/DL (ref 264–916)
TRIGL SERPL-MCNC: 123 MG/DL (ref 0–149)
TSH SERPL DL<=0.005 MIU/L-ACNC: 1.55 UIU/ML (ref 0.45–4.5)
VLDLC SERPL CALC-MCNC: 25 MG/DL (ref 5–40)
WBC # BLD AUTO: 4.3 X10E3/UL (ref 3.4–10.8)

## 2018-05-29 NOTE — PROGRESS NOTES
Inform pt to go to my chart to see results and recommendations    Mr. Madeleine Ramos concerned about your cholesterol the LDL has gone up  You need to work in exercise, weight loss and decrease the fats in your diet  We need to recheck the cholesterol in 3 months to make sure you are having progress. Any family history of heart disease? Please let me know    Mediterranean diet: Choose this heart-healthy diet option  The Mediterranean diet is a heart-healthy eating plan combining elements of Mediterranean-style cooking. Here's how to adopt the Mediterranean diet. If you're looking for a heart-healthy eating plan, the Mediterranean diet might be right for you. The Mediterranean diet incorporates the basics of healthy eating  plus a splash of flavorful olive oil and perhaps a glass of red wine  among other components characterizing the traditional cooking style of countries bordering the [facility]. Most healthy diets include fruits, vegetables, fish and whole grains, and limit unhealthy fats. While these parts of a healthy diet remain tried-and-true, subtle variations or differences in proportions of certain foods may make a difference in your risk of heart disease. Benefits of the [address] has shown that the traditional Mediterranean diet reduces the risk of heart disease. In fact, a recent analysis of more than 1.5 million healthy adults demonstrated that following a Mediterranean diet was associated with a reduced risk of overall and cardiovascular mortality, a reduced incidence of cancer and cancer mortality, and a reduced incidence of Parkinson's and Alzheimer's diseases. For this reason, most if not all major scientific organizations encourage healthy adults to adapt a style of eating like that of the [address] for prevention of major chronic diseases.        Key components of the Mediterranean diet  The Mediterranean diet emphasizes:   Getting plenty of exercise Eating primarily plant-based foods, such as fruits and vegetables, whole grains, legumes and nuts   Replacing butter with healthy fats such as olive oil and canola oil   Using herbs and spices instead of salt to flavor foods   Limiting red meat to no more than a few times a month   Eating fish and poultry at least twice a week   Drinking red wine in moderation (optional)   The diet also recognizes the importance of enjoying meals with family and friends. Fruits, vegetables, nuts and grains  The Mediterranean diet traditionally includes fruits, vegetables, pasta and rice. For example, residents of [facility] eat very little red meat and average nine servings a day of antioxidant-rich fruits and vegetables. The Mediterranean diet has been associated with a lower level of oxidized low-density lipoprotein (LDL) cholesterol  the \"bad\" cholesterol that's more likely to build up deposits in your arteries. Nuts are another part of a healthy Mediterranean diet. Nuts are high in fat (approximately 80 percent of their calories come from fat), but most of the fat is not saturated. Because nuts are high in calories, they should not be eaten in large amounts  generally no more than a handful a day. For the best nutrition, avoid candied or honey-roasted and heavily salted nuts. Grains in the [address] [place] [address] region are typically whole grain and usually contain very few unhealthy trans fats, and bread is an important part of the diet there. However, throughout the [address] [place] [address] region, bread is eaten plain or dipped in olive oil  not eaten with butter or margarines, which contain saturated or trans fats.

## 2018-07-06 ENCOUNTER — OFFICE VISIT (OUTPATIENT)
Dept: FAMILY MEDICINE CLINIC | Age: 48
End: 2018-07-06

## 2018-07-06 VITALS
OXYGEN SATURATION: 98 % | TEMPERATURE: 98.4 F | HEART RATE: 75 BPM | WEIGHT: 225.6 LBS | DIASTOLIC BLOOD PRESSURE: 80 MMHG | RESPIRATION RATE: 18 BRPM | HEIGHT: 69 IN | SYSTOLIC BLOOD PRESSURE: 128 MMHG | BODY MASS INDEX: 33.41 KG/M2

## 2018-07-06 DIAGNOSIS — R53.83 FATIGUE, UNSPECIFIED TYPE: ICD-10-CM
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
## 2018-07-06 RX ORDER — DOXYCYCLINE 100 MG/1
100 TABLET ORAL 2 TIMES DAILY
Qty: 42 TAB | Refills: 0 | Status: SHIPPED | OUTPATIENT
Start: 2018-07-06 | End: 2018-07-27

## 2018-07-06 NOTE — PROGRESS NOTES
Kofi Cai is a 51 y.o. male  Chief Complaint   Patient presents with    Insect Bite     Pt reports tick bite x 2 weeks. Reports redness at the site     1. Have you been to the ER, urgent care clinic since your last visit? Hospitalized since your last visit? No    2. Have you seen or consulted any other health care providers outside of the [facility] since your last visit? Include any pap smears or colon screening.  No

## 2018-07-06 NOTE — PROGRESS NOTES
Patient Name: Isaac San   MRN: [id number]    Robbin Hayden is a 51 y.o. male who presents with the following:     Patient states he has had 2 week history of fatigue, sore throat, constipation/diarrhea, and occasional productive cough. Was in the woods about 2 weeks ago in [place] and believes he got several bug bites at that time. His wife removed a tick from his back the other day and the area was noted to be red. Patient denies any history of fever, weight loss, history of Lyme disease, joint pains. Review of Systems   Constitutional: Positive for malaise/fatigue. Negative for fever and weight loss. HENT: Positive for sore throat. Respiratory: Positive for cough. Negative for hemoptysis, shortness of breath and wheezing. Cardiovascular: Negative for chest pain, palpitations, leg swelling and PND. Gastrointestinal: Positive for constipation and diarrhea. Negative for abdominal pain, nausea and vomiting. The patient's medications, allergies, past medical history, surgical history, family history and social history were reviewed and updated where appropriate. Prior to Admission medications    Medication Sig Start Date End Date Taking? Authorizing Provider   cholecalciferol, vitamin D3, (VITAMIN D3) 2,000 unit tab Take  by mouth. Yes Historical Provider   omega 3-DHA-EPA-fish oil 1,000 mg (120 mg-180 mg) capsule Take 1 Cap by mouth daily. Yes Historical Provider   magnesium 250 mg tab Take  by mouth daily. Yes Historical Provider       No Known Allergies        OBJECTIVE    Visit Vitals    /80 (BP 1 Location: Left arm, BP Patient Position: Sitting)    Pulse 75    Temp 98.4 °F (36.9 °C) (Oral)    Resp 18    Ht 5' 9\" (1.753 m)    Wt 225 lb 9.6 oz (102.3 kg)    SpO2 98%    BMI 33.32 kg/m2       Physical Exam   Constitutional: He is oriented to person, place, and time and well-developed, well-nourished, and in no distress. No distress.    HENT:   Head: Normocephalic and atraumatic. Right Ear: Tympanic membrane is not perforated and not erythematous. No middle ear effusion. No decreased hearing is noted. Left Ear: Tympanic membrane is not perforated and not erythematous. No middle ear effusion. No decreased hearing is noted. Nose: Nose normal. Right sinus exhibits no maxillary sinus tenderness and no frontal sinus tenderness. Left sinus exhibits no maxillary sinus tenderness and no frontal sinus tenderness. Mouth/Throat: Uvula is midline, oropharynx is clear and moist and mucous membranes are normal.   Neck: Normal range of motion. Neck supple. Cardiovascular: Normal rate, regular rhythm and normal heart sounds. Exam reveals no gallop and no friction rub. No murmur heard. Pulmonary/Chest: Effort normal and breath sounds normal. No respiratory distress. He has no wheezes. Lymphadenopathy:     He has no cervical adenopathy. Neurological: He is alert and oriented to person, place, and time. Skin: Skin is warm and dry. He is not diaphoretic. There is erythema. Erythematous asymmetric patch along right upper back. Area explored for any remainder tick but only notable for a scab. Psychiatric: Mood, memory, affect and judgment normal.   Nursing note and vitals reviewed. ASSESSMENT AND PLAN  Inez Jo is a 51 y.o. male who presents today for:    1. Tick bite, initial encounter  Given patient's ongoing symptoms and known tick exposure, will start treatment for Lyme disease. Discussed with patient risks of lab testing at this point including false positives. Encourage patient to call back in the next week if symptoms do not appear to be improving despite starting antibiotics. Encourage patient to avoid direct sunlight while on doxycycline and to use sun protection. Review to take with food and probiotic/yogurt daily while on abx.   - doxycycline (ADOXA) 100 mg tablet; Take 1 Tab by mouth two (2) times a day for 21 days.   Dispense: 42 Tab; Refill: 0    2. Fatigue, unspecified type  Symptoms above may be related to tick borne illness versus viral URI/GI syndrome. Encourage supportive care. Reviewed signs and symptoms that would indicate a worsening medical condition which would require immediate evaluation and treatment; patient expressed understanding of plan. There are no discontinued medications. Follow-up Disposition:  Return if symptoms worsen or fail to improve. Medication risks/benefits/costs/interactions/alternatives discussed with patient. Advised patient to call back or return to office if symptoms worsen/change/persist. If patient cannot reach us or should anything more severe/urgent arise he/she should proceed directly to the nearest emergency department. Discussed expected course/resolution/complications of diagnosis in detail with patient. Patient given a written after visit summary which includes his/her diagnoses, current medications and vitals. Patient expressed understanding with the diagnosis and plan.      Loida Canales M.D.

## 2018-07-06 NOTE — MR AVS SNAPSHOT
[address] [facility] 
 
 
 [address] 
[phone] Patient: Allen Damon MRN: [id number] UOH:2[birth year]61/[birth year]49 Visit Information Date & Time Provider Department Dept. Phone Encounter #  
 7/6/2018  1:15 PM Breanna Grant  [facility] [phone] [id number] Follow-up Instructions Return if symptoms worsen or fail to improve. Your Appointments 10/1/2018  9:00 AM  
New Patient with Jorge L Brewster MD  
[address] ([facility] Appt Note: np est pcp // $0cp LMJ 06/1/2018  
 [address] [facility] [address] [facility] [address]  
[phone]  
  
   
 [address] [facility] [address] [address] Upcoming Health Maintenance Date Due DTaP/Tdap/Td series (1 - Tdap) 6/14/1990 Influenza Age 5 to Adult 8/1/2018 COLONOSCOPY 9/8/2021 Allergies as of 7/6/2018  Review Complete On: 7/6/2018 By: Mark Kearns No Known Allergies Current Immunizations  Reviewed on 2/11/2015 Name Date Influenza Vaccine (Quad) PF 10/28/2015 Not reviewed this visit Vitals BP Pulse Temp Resp Height(growth percentile) Weight(growth percentile) 128/80 (BP 1 Location: Left arm, BP Patient Position: Sitting) 75 98.4 °F (36.9 °C) (Oral) 18 5' 9\" (1.753 m) 225 lb 9.6 oz (102.3 kg) SpO2 BMI Smoking Status 98% 33.32 kg/m2 Never Smoker Vitals History BMI and BSA Data Body Mass Index Body Surface Area  
 33.32 kg/m 2 2.23 m 2 Preferred Pharmacy Pharmacy Name Phone [address] [address] [facility] [address] [phone] Your Updated Medication List  
  
   
This list is accurate as of 7/6/18  1:48 PM.  Always use your most recent med list.  
  
  
  
  
 doxycycline 100 mg tablet Commonly known as:  ADOXA Take 1 Tab by mouth two (2) times a day for 21 days. magnesium 250 mg Tab Take  by mouth daily. omega 3-DHA-EPA-fish oil 1,000 mg (120 mg-180 mg) capsule Take 1 Cap by mouth daily. VITAMIN D3 2,000 unit Tab Generic drug:  cholecalciferol (vitamin D3) Take  by mouth. Prescriptions Sent to Pharmacy Refills  
 doxycycline (ADOXA) 100 mg tablet 0 Sig: Take 1 Tab by mouth two (2) times a day for 21 days. Class: Normal  
 Pharmacy: [facility] [address] [place] [address] 95 AT [address] #: [phone] Route: Oral  
  
Follow-up Instructions Return if symptoms worsen or fail to improve. Patient Instructions Tick Bite: Care Instructions Your Care Instructions Ticks are small spiderlike animals. They bite to fasten themselves onto your skin and feed on your blood. Ticks can carry diseases. But most ticks do not carry diseases, and most tick bites do not cause serious health problems. Some people may have an allergic reaction to a tick bite. This reaction may be mild, with symptoms like itching and swelling. In rare cases, a severe allergic reaction may occur. Most of the time, all you need to do for a tick bite is relieve any symptoms you may have. Follow-up care is a key part of your treatment and safety. Be sure to make and go to all appointments, and call your doctor if you are having problems. It's also a good idea to know your test results and keep a list of the medicines you take. How can you care for yourself at home? · Put ice or a cold pack on the bite for 15 to 20 minutes once an hour. Put a thin cloth between the ice and your skin. · Try an over-the-counter medicine to relieve itching, redness, swelling, and pain. Be safe with medicines. Read and follow all instructions on the label.  
¨ Take an antihistamine medicine, such as a nondrowsy one like loratadine (Claritin) or one that might make you sleepy like diphenhydramine (Benadryl). These medicines may help relieve itching, redness, and swelling. ¨ Use a spray of local anesthetic that contains benzocaine, such as Solarcaine. It may help relieve pain. If your skin reacts to the spray, stop using it. ¨ Put calamine lotion on the skin. It may help relieve itching. To avoid tick bites · Avoid ticks: 
¨ Learn where ticks are found in your community, and stay away from those areas if possible. ¨ Cover as much of your body as possible when you work or play in grassy or wooded areas. ¨ Use insect repellents, such as products containing DEET. You can spray them on your skin. ¨ Take steps to control ticks on your property if you live in an area where Lyme disease occurs. Clear leaves, brush, tall grasses, woodpiles, and stone fences from around your house and the edges of your yard or garden. This may help get rid of ticks. · When you come in from outdoors, check your body for ticks, including your groin, head, and underarms. The ticks may be about the size of a sesame seed. If no one else can help you check for ticks on your scalp, comb your hair with a fine-tooth comb. · If you find a tick, remove it quickly. Use tweezers to grasp the tick as close to its mouth (the part in your skin) as possible. Slowly pull the tick straight out-do not twist or yank-until its mouth releases from your skin. · Ticks can come into your house on clothing, outdoor gear, and pets. These ticks can fall off and attach to you. ¨ Check your clothing and outdoor gear. Remove any ticks you find. Then put your clothing in a clothes dryer on high heat for 1 hour to kill any ticks that might remain. ¨ Check your pets for ticks after they have been outdoors. · When hiking in the woods, carry a small dry jar or ziplock bag. If you find a tick on your body, remove the tick and put it in the jar or bag.  Store the container in the freezer so you can give it to your doctor if symptoms develop. The tick can be tested to learn whether it is carrying the bacteria that cause Lyme disease. When should you call for help? Call 911 anytime you think you may need emergency care. For example, call if: 
? · You have symptoms of a severe allergic reaction. These may include: 
¨ Sudden raised, red areas (hives) all over your body. ¨ Swelling of the throat, mouth, lips, or tongue. ¨ Trouble breathing. ¨ Passing out (losing consciousness). Or you may feel very lightheaded or suddenly feel weak, confused, or restless. ?Call your doctor now or seek immediate medical care if: 
? · You have signs of infection, such as: 
¨ Increased pain, swelling, warmth, or redness around the bite. ¨ Red streaks leading from the bite. ¨ Pus draining from the bite. ¨ A fever. ? Watch closely for changes in your health, and be sure to contact your doctor if: 
? · You develop a new rash. ? · You have joint pain. ? · You are very tired. ? · You have flu-like symptoms. ? · You have symptoms for more than 1 week. Where can you learn more? Go to http://catalina-zahraa.info/. Enter K854 in the search box to learn more about \"Tick Bite: Care Instructions. \" Current as of: March 20, 2017 Content Version: 11.4 © 3038-8987 [employer]. Care instructions adapted under license by [employer] (which disclaims liability or warranty for this information). If you have questions about a medical condition or this instruction, always ask your healthcare professional. [address] [place] [address] any warranty or liability for your use of this information. Introducing [facility] & HEALTH SERVICES! Dear Avtar Bucio: Thank you for requesting a [employer] account. Our records indicate that you already have an active [employer] account. You can access your account anytime at [email or web address] Did you know that you can access your hospital and ER discharge instructions at any time in ZeniMax? You can also review all of your test results from your hospital stay or ER visit. Additional Information If you have questions, please visit the Frequently Asked Questions section of the [employer] website at [email or web address] Remember, [employer] is NOT to be used for urgent needs. For medical emergencies, dial 911. Now available from your iPhone and Android! Please provide this summary of care documentation to your next provider. Your primary care clinician is listed as Jeremiah Woodall. If you have any questions after today's visit, please call [phone].

## 2018-07-06 NOTE — PATIENT INSTRUCTIONS
Tick Bite: Care Instructions  Your Care Instructions    Ticks are small spiderlike animals. They bite to fasten themselves onto your skin and feed on your blood. Ticks can carry diseases. But most ticks do not carry diseases, and most tick bites do not cause serious health problems. Some people may have an allergic reaction to a tick bite. This reaction may be mild, with symptoms like itching and swelling. In rare cases, a severe allergic reaction may occur. Most of the time, all you need to do for a tick bite is relieve any symptoms you may have. Follow-up care is a key part of your treatment and safety. Be sure to make and go to all appointments, and call your doctor if you are having problems. It's also a good idea to know your test results and keep a list of the medicines you take. How can you care for yourself at home? · Put ice or a cold pack on the bite for 15 to 20 minutes once an hour. Put a thin cloth between the ice and your skin. · Try an over-the-counter medicine to relieve itching, redness, swelling, and pain. Be safe with medicines. Read and follow all instructions on the label. ¨ Take an antihistamine medicine, such as a nondrowsy one like loratadine (Claritin) or one that might make you sleepy like diphenhydramine (Benadryl). These medicines may help relieve itching, redness, and swelling. ¨ Use a spray of local anesthetic that contains benzocaine, such as Solarcaine. It may help relieve pain. If your skin reacts to the spray, stop using it. ¨ Put calamine lotion on the skin. It may help relieve itching. To avoid tick bites  · Avoid ticks:  ¨ Learn where ticks are found in your community, and stay away from those areas if possible. ¨ Cover as much of your body as possible when you work or play in grassy or wooded areas. ¨ Use insect repellents, such as products containing DEET. You can spray them on your skin.   ¨ Take steps to control ticks on your property if you live in an area where Lyme disease occurs. Clear leaves, brush, tall grasses, woodpiles, and stone fences from around your house and the edges of your yard or garden. This may help get rid of ticks. · When you come in from outdoors, check your body for ticks, including your groin, head, and underarms. The ticks may be about the size of a sesame seed. If no one else can help you check for ticks on your scalp, comb your hair with a fine-tooth comb. · If you find a tick, remove it quickly. Use tweezers to grasp the tick as close to its mouth (the part in your skin) as possible. Slowly pull the tick straight out-do not twist or yank-until its mouth releases from your skin. · Ticks can come into your house on clothing, outdoor gear, and pets. These ticks can fall off and attach to you. ¨ Check your clothing and outdoor gear. Remove any ticks you find. Then put your clothing in a clothes dryer on high heat for 1 hour to kill any ticks that might remain. ¨ Check your pets for ticks after they have been outdoors. · When hiking in the woods, carry a small dry jar or ziplock bag. If you find a tick on your body, remove the tick and put it in the jar or bag. Store the container in the freezer so you can give it to your doctor if symptoms develop. The tick can be tested to learn whether it is carrying the bacteria that cause Lyme disease. When should you call for help? Call 911 anytime you think you may need emergency care. For example, call if:  ? · You have symptoms of a severe allergic reaction. These may include:  ¨ Sudden raised, red areas (hives) all over your body. ¨ Swelling of the throat, mouth, lips, or tongue. ¨ Trouble breathing. ¨ Passing out (losing consciousness). Or you may feel very lightheaded or suddenly feel weak, confused, or restless. ?Call your doctor now or seek immediate medical care if:  ? · You have signs of infection, such as:  ¨ Increased pain, swelling, warmth, or redness around the bite.   ¨ Red streaks leading from the bite. ¨ Pus draining from the bite. ¨ A fever. ? Watch closely for changes in your health, and be sure to contact your doctor if:  ? · You develop a new rash. ? · You have joint pain. ? · You are very tired. ? · You have flu-like symptoms. ? · You have symptoms for more than 1 week. Where can you learn more? Go to http://catalina-zahraa.info/. Enter Y477 in the search box to learn more about \"Tick Bite: Care Instructions. \"  Current as of: March 20, 2017  Content Version: 11.4  © 3962-0907 [employer]. Care instructions adapted under license by [employer] (which disclaims liability or warranty for this information). If you have questions about a medical condition or this instruction, always ask your healthcare professional. [address] any warranty or liability for your use of this information.

## 2018-08-22 ENCOUNTER — OFFICE VISIT (OUTPATIENT)
Dept: FAMILY MEDICINE CLINIC | Age: 48
End: 2018-08-22

## 2018-08-22 VITALS
TEMPERATURE: 97.2 F | DIASTOLIC BLOOD PRESSURE: 85 MMHG | BODY MASS INDEX: 32.58 KG/M2 | HEIGHT: 69 IN | WEIGHT: 220 LBS | OXYGEN SATURATION: 98 % | HEART RATE: 68 BPM | RESPIRATION RATE: 18 BRPM | SYSTOLIC BLOOD PRESSURE: 135 MMHG

## 2018-08-22 DIAGNOSIS — R41.3 MEMORY CHANGES: ICD-10-CM

## 2018-08-22 DIAGNOSIS — N52.9 ERECTILE DYSFUNCTION, UNSPECIFIED ERECTILE DYSFUNCTION TYPE: ICD-10-CM

## 2018-08-22 DIAGNOSIS — G47.9 SLEEP DISTURBANCE: Primary | ICD-10-CM

## 2018-08-22 RX ORDER — TADALAFIL 10 MG/1
10 TABLET ORAL
Qty: 8 TAB | Refills: 5 | Status: SHIPPED | OUTPATIENT
Start: 2018-08-22 | End: 2020-11-29

## 2018-08-22 NOTE — MR AVS SNAPSHOT
[address] 
[phone] Patient: Katty Chow MRN: [id number] IBM:[id number] Visit Information Date & Time Provider Department Dept. Phone Encounter #  
 8/22/2018  4:00 PM Lou Cerda, [address] [facility] [phone] [id number] Follow-up Instructions Return in about 8 months (around 4/24/2019) for physical exam.  
  
Your Appointments 10/1/2018  9:00 AM  
New Patient with Brunilda Workman MD  
[facility] [address] [facility]) Appt Note: np est pcp // $0cp LMJ 06/1/2018; referring doctor Dr. Molly Vernon; np est pcp // $0cp LMJ 06/1/2018 ref'd by Dr Noble Ny [facility] [phone]  
[address] [place] [phone] Upcoming Health Maintenance Date Due DTaP/Tdap/Td series (1 - Tdap) 6/14/1990 Influenza Age 5 to Adult 10/1/2018* COLONOSCOPY 9/8/2021 *Topic was postponed. The date shown is not the original due date. Allergies as of 8/22/2018  Review Complete On: 8/22/2018 By: Lou Cerda MD  
 No Known Allergies Current Immunizations  Reviewed on 2/11/2015 Name Date Influenza Vaccine (Quad) PF 10/28/2015 Not reviewed this visit You Were Diagnosed With   
  
 Codes Comments Sleep disturbance    -  Primary ICD-10-CM: G47.9 ICD-9-CM: 780.50 Memory changes     ICD-10-CM: R41.3 ICD-9-CM: 780.93 Erectile dysfunction, unspecified erectile dysfunction type     ICD-10-CM: N52.9 ICD-9-CM: 607.84 Vitals BP Pulse Temp Resp Height(growth percentile) Weight(growth percentile) 135/85 (BP 1 Location: Left arm, BP Patient Position: Sitting) 68 97.2 °F (36.2 °C) (Oral) 18 5' 9\" (1.753 m) 220 lb (99.8 kg) SpO2 BMI Smoking Status 98% 32.49 kg/m2 Never Smoker Vitals History BMI and BSA Data Body Mass Index Body Surface Area 32.49 kg/m 2 2.2 m 2 Preferred Pharmacy Pharmacy Name Phone [address], [address] [place] [address] 95 AT [address] [address] [phone] Your Updated Medication List  
  
   
This list is accurate as of 8/22/18  5:38 PM.  Always use your most recent med list.  
  
  
  
  
 magnesium 250 mg Tab Take  by mouth daily. omega 3-DHA-EPA-fish oil 1,000 mg (120 mg-180 mg) capsule Take 1 Cap by mouth daily. tadalafil 10 mg tablet Commonly known as:  CIALIS Take 1 Tab by mouth daily as needed. VITAMIN D3 2,000 unit Tab Generic drug:  cholecalciferol (vitamin D3) Take  by mouth. Prescriptions Sent to Pharmacy Refills  
 tadalafil (CIALIS) 10 mg tablet 5 Sig: Take 1 Tab by mouth daily as needed. Class: Normal  
 Pharmacy: [facility] [address] [place] [address] AT [address] #: [phone] Route: Oral  
  
We Performed the Following REFERRAL TO PSYCHOLOGY [UVM69 Custom] Comments:  
 Please evaluate patient for ADD. SLEEP MEDICINE REFERRAL [NUV119 Custom] Comments:  
 Orders: 
Sleep Medicine Consult - Schedule patient for a sleep specialist consult. If appropriate, schedule patient for sleep study(s). Initiate treatment if needed. Forward correspondance to my office. Follow-up Instructions Return in about 8 months (around 4/24/2019) for physical exam.  
  
  
Referral Information Referral ID Referred By Referred To  
  
 [id number] Doug Barlow MD   
   [address] [address] Phone: [phone] Fax: [phone] Visits Status Start Date End Date 1 New Request 8/22/18 8/22/19 If your referral has a status of pending review or denied, additional information will be sent to support the outcome of this decision. Referral ID Referred By Referred To [id number] Alfred RosarioLen [place] [address] Ajith Haider [facility] [address], [address] Mandy Fuller  Phone: [phone] Fax: [phone] Visits Status Start Date End Date 1 New Request 8/22/18 8/22/19 If your referral has a status of pending review or denied, additional information will be sent to support the outcome of this decision. Patient Instructions Erectile Dysfunction: Care Instructions Your Care Instructions A man has erectile dysfunction (ED) when he routinely can't get or keep an erection that allows satisfactory sex. He may not be able to have an erection at any time. Or he may not be able to have one that is firm enough or lasts long enough to complete intercourse. ED is not the same as having trouble getting an erection now and then. That's common. It happens to most men at some time. ED can be caused by problems with the blood vessels, nerves, or hormones. It can be caused by diabetes, heart disease, and injuries. Nerve disorders, such as multiple sclerosis or Parkinson's disease, can also cause it. ED can also be caused by medicines, alcohol, and tobacco. Or it may be caused by depression, stress, grief, or relationship problems. Follow-up care is a key part of your treatment and safety. Be sure to make and go to all appointments, and call your doctor if you are having problems. It's also a good idea to know your test results and keep a list of the medicines you take. How can you care for yourself at home? 
 Lifestyle 
  · Limit alcohol. Have no more than 2 drinks a day.  
  · Do not smoke. Smoking makes it harder for the blood vessels in the penis to relax and let blood flow in. If you need help quitting, talk to your doctor about stop-smoking programs and medicines. These can increase your chances of quitting for good.  
  · Do not use cocaine, heroin, or other illegal drugs.  
  · Try to reduce stress.   · Give yourself time to adjust to change. Changes in your job, family, relationships, home life, and other areas can cause stress. And stress can cause erection problems.  
 Work with your partner 
  · Don't assume that you know what your partner likes when it comes to sex. You may be wrong. Talk about what each of you does and does not enjoy.  
  · Make time outside of the bedroom to talk about your sex life. If you avoid sex because you are afraid of having erection problems, your partner may worry that you are no longer interested.  
  · If you and your partner have trouble talking about sex, see a therapist who can help you talk about it. Reading books with your partner about sexual health may also help.  
  · Relax. Take time for more foreplay. Worrying about your erections may only make things worse. Medicines 
  · Tell your doctor about all the medicines that you take. ¨ Some medicines can cause erection problems. ¨ Some medicines can have dangerous interactions with medicines that are prescribed for ED, including over-the-counter medicines and herbal products.  
  · Be safe with medicines. Take your medicines exactly as prescribed. Call your doctor if you think you are having a problem with your medicine.  
  · Talk to your doctor about trying a medicine to help you keep an erection. This could be a medicine such as Viagra, Levitra, or Cialis. If you have a heart problem, ask your doctor if these are safe for you. Do not take these medicines if you take nitroglycerin or other nitrate medicine. When should you call for help? Call your doctor now or seek immediate medical care if: 
  · You took a medicine for erectile dysfunction and you have an erection that lasts longer than 3 hours.  
 Watch closely for changes in your health, and be sure to contact your doctor if you have any problems. Where can you learn more? Go to http://catalina-zahraa.info/. Enter [id number] in the search box to learn more about \"Erectile Dysfunction: Care Instructions. \" Current as of: December 3, 2017 Content Version: 11.7 © 3782-1668 ClearGist. Care instructions adapted under license by [employer] (which disclaims liability or warranty for this information). If you have questions about a medical condition or this instruction, always ask your healthcare professional. [address] any warranty or liability for your use of this information. Introducing [facility] & HEALTH SERVICES! Dear Annamarie Castro: Thank you for requesting a Xang[employer] account. Our records indicate that you already have an active Xangati account. You can access your account anytime at [email or web address] Did you know that you can access your hospital and ER discharge instructions at any time in Xangati? You can also review all of your test results from your hospital stay or ER visit. Additional Information If you have questions, please visit the Frequently Asked Questions section of the Xangati website at [email or web address] Remember, Xangati is NOT to be used for urgent needs. For medical emergencies, dial 911. Now available from your iPhone and Android! Please provide this summary of care documentation to your next provider. Your primary care clinician is listed as Maira Skinner. If you have any questions after today's visit, please call [phone].

## 2018-08-22 NOTE — PROGRESS NOTES
HISTORY OF PRESENT ILLNESS  April Olguin is a 51 y.o. male. Blood pressure 135/85, pulse 68, temperature 97.2 °F (36.2 °C), temperature source Oral, resp. rate 18, height 5' 9\" (1.753 m), weight 220 lb (99.8 kg), SpO2 98 %. Body mass index is 32.49 kg/(m^2). Chief Complaint   Patient presents with    Erectile Dysfunction     follow up, and refill    Other     Discuss results for ADD from Dr. Minerva Ware        HPI  April Olguin 51 y.o. male  presents to the office today for routine follow up. ED: Pt notes that he has been having issues with his ED. He notes that he would like a refill of his Cialis 10mg. I advised pt that I will put in an order for this medication. Health Maintenance: Pt met with Dr. Neri Heard PhD (psychology) for possible ADD. Dr. Minerva Ware believes pt does not have ADD but is concerned about underlying sleep disorder and alcohol consumption. Dr. Minerva Ware advised pt that he visit with a sleep specialist. Pt reports that he does not believe he has an alcohol problem. Pt reports that he drinks 3 to 4 servings of bourbon a night but recently has reduced his consumption. Pt scored a 1 on the CAGE screen. Advised pt that he should reduce his alcohol consumption. Pt notes that he does not want to use a CPAP machine. I advised pt that there are other treatment options such as a mouth piece or nose clamp. Discussed with pt that sleep apnea can have long-term negative affects on his health. Advised pt that I will put in a referral to Dr. Aime Coronado MD (sleep) for possible sleep study. Pt notes that he wants a second opinion on his ADD evaluation and the results relating to his memory. I advised pt that I will put in a referral to Dr. Michael Erickson (psych). Current Outpatient Prescriptions   Medication Sig Dispense Refill    tadalafil (CIALIS) 10 mg tablet Take 1 Tab by mouth daily as needed. 8 Tab 5    omega 3-DHA-EPA-fish oil 1,000 mg (120 mg-180 mg) capsule Take 1 Cap by mouth daily.  magnesium 250 mg tab Take  by mouth daily.  cholecalciferol, vitamin D3, (VITAMIN D3) 2,000 unit tab Take  by mouth. No Known Allergies  Past Medical History:   Diagnosis Date    ADD (attention deficit disorder)     Arthritis     left knee    Preglaucoma of both eyes     Psoriasis 02/2017     Past Surgical History:   Procedure Laterality Date    HX ORTHOPAEDIC       Family History   Problem Relation Age of Onset   [address] [facility] Gl[facility] Father     Hypertension Father     Stroke Sister      Social History   Substance Use Topics    Smoking status: Never Smoker    Smokeless tobacco: Never Used    Alcohol use 7.5 oz/week     15 Standard drinks or equivalent per week      Comment: 2-3 drinks per day        Review of Systems   Constitutional: Negative. Negative for malaise/fatigue. Eyes: Negative for blurred vision. Respiratory: Negative for shortness of breath. Cardiovascular: Negative for chest pain and leg swelling. Musculoskeletal: Negative. Neurological: Negative. Negative for dizziness and headaches. All other systems reviewed and are negative. Physical Exam   Constitutional: He is oriented to person, place, and time. He appears well-developed and well-nourished. HENT:   Head: Normocephalic and atraumatic. Neck: Carotid bruit is not present. Cardiovascular: Normal rate, regular rhythm, normal heart sounds and intact distal pulses. Exam reveals no gallop and no friction rub. No murmur heard. Pulmonary/Chest: Effort normal and breath sounds normal. No respiratory distress. He has no wheezes. He has no rales. He exhibits no tenderness. Neurological: He is alert and oriented to person, place, and time. Psychiatric: He has a normal mood and affect. His behavior is normal. Judgment and thought content normal.   Nursing note and vitals reviewed. ASSESSMENT and PLAN  Diagnoses and all orders for this visit:    1.  Sleep disturbance  -     SLEEP MEDICINE REFERRAL ( Carrie Ray)        - Advised pt that I will be referring him to Dr. Carrie Ray MD (sleep) to discuss possible sleep study/    2. Memory changes  -     REFERRAL TO PSYCHOLOGY (Dr. Elizabeth Garcia)        - Advised pt that I will be referring him to Dr. Elizabeth Garcia (psych)    3. Erectile dysfunction, unspecified erectile dysfunction type  -     tadalafil (CIALIS) 10 mg tablet; Take 1 Tab by mouth daily as needed. - Advised pt that I will refill his Cialis 10mg. Follow-up Disposition:  Return in about 8 months (around 4/24/2019) for physical exam.   Medication risks/benefits/costs/interactions/alternatives discussed with patient. Advised patient to call back or return to office if symptoms worsen/change/persist.  If patient cannot reach us or should anything more severe/urgent arise he/she should proceed directly to the nearest emergency department. Discussed expected course/resolution/complications of diagnosis in detail with patient. Patient given a written after visit summary which includes her diagnoses, current medications and vitals. Patient expressed understanding with the diagnosis and plan. Written by jany Lares, as dictated by Mary Coronado M.D.  5:20 PM - 5:39 PM    Total time spent with the patient 19 minutes, greater than 50% of time spent counseling patient.      I have reviewed and agree with the above note and have made corrections where appropriate, Dr. Adalberto Burns MD

## 2018-08-22 NOTE — PROGRESS NOTES
Chief Complaint   Patient presents with    Erectile Dysfunction     follow up, and refill    Other     Discuss results for ADD from Dr. Aneesh Pedersen       Reviewed Record in preparation for visit and have obtained necessary documentation. Identified pt with two pt identifiers (Name @ )    Health Maintenance Due   Topic    DTaP/Tdap/Td series (1 - Tdap)    Influenza Age 5 to Adult          1. Have you been to the ER, urgent care clinic since your last visit? Hospitalized since your last visit? no    2. Have you seen or consulted any other health care providers outside of the [address] [place] [address] since your last visit? Include any pap smears or colon screening.  no

## 2018-08-22 NOTE — PATIENT INSTRUCTIONS
Erectile Dysfunction: Care Instructions  Your Care Instructions    A man has erectile dysfunction (ED) when he routinely can't get or keep an erection that allows satisfactory sex. He may not be able to have an erection at any time. Or he may not be able to have one that is firm enough or lasts long enough to complete intercourse. ED is not the same as having trouble getting an erection now and then. That's common. It happens to most men at some time. ED can be caused by problems with the blood vessels, nerves, or hormones. It can be caused by diabetes, heart disease, and injuries. Nerve disorders, such as multiple sclerosis or Parkinson's disease, can also cause it. ED can also be caused by medicines, alcohol, and tobacco. Or it may be caused by depression, stress, grief, or relationship problems. Follow-up care is a key part of your treatment and safety. Be sure to make and go to all appointments, and call your doctor if you are having problems. It's also a good idea to know your test results and keep a list of the medicines you take. How can you care for yourself at home?   Lifestyle    · Limit alcohol. Have no more than 2 drinks a day.     · Do not smoke. Smoking makes it harder for the blood vessels in the penis to relax and let blood flow in. If you need help quitting, talk to your doctor about stop-smoking programs and medicines. These can increase your chances of quitting for good.     · Do not use cocaine, heroin, or other illegal drugs.     · Try to reduce stress.     · Give yourself time to adjust to change. Changes in your job, family, relationships, home life, and other areas can cause stress. And stress can cause erection problems.    Work with your partner    · Don't assume that you know what your partner likes when it comes to sex. You may be wrong. Talk about what each of you does and does not enjoy.     · Make time outside of the bedroom to talk about your sex life.  If you avoid sex because you are afraid of having erection problems, your partner may worry that you are no longer interested.     · If you and your partner have trouble talking about sex, see a therapist who can help you talk about it. Reading books with your partner about sexual health may also help.     · Relax. Take time for more foreplay. Worrying about your erections may only make things worse. Medicines    · Tell your doctor about all the medicines that you take. ¨ Some medicines can cause erection problems. ¨ Some medicines can have dangerous interactions with medicines that are prescribed for ED, including over-the-counter medicines and herbal products.     · Be safe with medicines. Take your medicines exactly as prescribed. Call your doctor if you think you are having a problem with your medicine.     · Talk to your doctor about trying a medicine to help you keep an erection. This could be a medicine such as Viagra, Levitra, or Cialis. If you have a heart problem, ask your doctor if these are safe for you. Do not take these medicines if you take nitroglycerin or other nitrate medicine. When should you call for help? Call your doctor now or seek immediate medical care if:    · You took a medicine for erectile dysfunction and you have an erection that lasts longer than 3 hours.    Watch closely for changes in your health, and be sure to contact your doctor if you have any problems. Where can you learn more? Go to http://catalina-zahraa.info/. Enter [id number] [id number] in the search box to learn more about \"Erectile Dysfunction: Care Instructions. \"  Current as of: December 3, 2017  Content Version: 11.7  © 9987-8141 [employer]. Care instructions adapted under license by [employer] (which disclaims liability or warranty for this information).  If you have questions about a medical condition or this instruction, always ask your healthcare professional. [address] any warranty or liability for your use of this information.

## 2018-08-23 ENCOUNTER — TELEPHONE (OUTPATIENT)
Dept: FAMILY MEDICINE CLINIC | Age: 48
End: 2018-08-23

## 2018-08-23 NOTE — TELEPHONE ENCOUNTER
Claude Aurora, Dr. Lindajo Clock office , they said they need patient's Demographics sent over. (f) [phone].

## 2018-11-08 ENCOUNTER — OFFICE VISIT (OUTPATIENT)
Dept: FAMILY MEDICINE CLINIC | Age: 48
End: 2018-11-08

## 2018-11-08 VITALS
HEART RATE: 65 BPM | BODY MASS INDEX: 31.84 KG/M2 | SYSTOLIC BLOOD PRESSURE: 119 MMHG | DIASTOLIC BLOOD PRESSURE: 75 MMHG | WEIGHT: 215 LBS | RESPIRATION RATE: 18 BRPM | TEMPERATURE: 98.4 F | HEIGHT: 69 IN | OXYGEN SATURATION: 98 %

## 2018-11-08 DIAGNOSIS — J06.9 URI WITH COUGH AND CONGESTION: Primary | ICD-10-CM

## 2018-11-08 RX ORDER — AZITHROMYCIN 250 MG/1
TABLET, FILM COATED ORAL
Qty: 6 TAB | Refills: 0 | Status: SHIPPED | OUTPATIENT
Start: 2018-11-08 | End: 2019-05-20 | Stop reason: ALTCHOICE

## 2018-11-08 RX ORDER — DICLOFENAC SODIUM 20 MG/G
2 SOLUTION TOPICAL
COMMUNITY
Start: 2018-07-17 | End: 2018-11-08

## 2018-11-08 NOTE — PROGRESS NOTES
[address] Note  Subjective:      Hoda Trevino is a 51 y.o. male who presents for an acute visit with the following chief complaints. Chief Complaint   Patient presents with    Nasal Congestion     pt has been having yellow green nasal and chest congestion for last 3-4 days. taking Mucinex DM OTC.  Cough     coughing for last 3 days. Cough  Patient complains of cough, nasal congestion, post nasal drip, irritated throat. Symptoms began 4 days ago, gradually worsening since onset. The cough is productive of clear sputum, some wheezing. No fever, chills, chest pain or shortness of breath. Symptoms are worse at night and in the morning. Patient do not have a history of asthma. Patient does not have a history of environmental allergens. Patient has not had recent travel. Patient does not have a history of smoking. Daughter was recently sick as well. Treatments: mucinex DM helps a little, he also took a tylenol cold and sinus product. Current Outpatient Medications   Medication Sig Dispense Refill    azithromycin (ZITHROMAX) 250 mg tablet Take 2 tablets today, then take 1 tablet daily 6 Tab 0    cholecalciferol, vitamin D3, (VITAMIN D3) 2,000 unit tab Take  by mouth.  omega 3-DHA-EPA-fish oil 1,000 mg (120 mg-180 mg) capsule Take 1 Cap by mouth daily.  magnesium 250 mg tab Take  by mouth daily.  tadalafil (CIALIS) 10 mg tablet Take 1 Tab by mouth daily as needed. 8 Tab 5     No Known Allergies    ROS:   Complete review of systems was reviewed with pertinent information listed in HPI. Review of Systems   Constitutional: Negative for chills, diaphoresis, fever, malaise/fatigue and weight loss. HENT: Positive for congestion. Negative for ear pain, hearing loss, sinus pain, sore throat and tinnitus. Eyes: Negative for blurred vision. Respiratory: Positive for cough, sputum production and wheezing. Negative for shortness of breath. Cardiovascular: Negative for chest pain and palpitations. Gastrointestinal: Negative for abdominal pain, diarrhea, heartburn, nausea and vomiting. Genitourinary: Negative for dysuria. Musculoskeletal: Negative for myalgias. Skin: Negative for rash. Neurological: Negative for dizziness, weakness and headaches. Objective:     Visit Vitals  /75 (BP 1 Location: Left arm, BP Patient Position: Sitting)   Pulse 65   Temp 98.4 °F (36.9 °C) (Oral)   Resp 18   Ht 5' 9\" (1.753 m)   Wt 215 lb (97.5 kg)   SpO2 98%   BMI 31.75 kg/m²       Vitals and Nurse Documentation reviewed. Physical Exam   Constitutional: He is oriented to person, place, and time and well-developed, well-nourished, and in no distress. He does not have a sickly appearance. HENT:   Head: Normocephalic and atraumatic. Right Ear: Hearing, external ear and ear canal normal. Tympanic membrane is not erythematous and not bulging. No middle ear effusion. Left Ear: Hearing, external ear and ear canal normal. Tympanic membrane is erythematous. Tympanic membrane is not bulging. No middle ear effusion. Nose: Mucosal edema present. No rhinorrhea. Right sinus exhibits no maxillary sinus tenderness and no frontal sinus tenderness. Left sinus exhibits no maxillary sinus tenderness and no frontal sinus tenderness. Mouth/Throat: Uvula is midline, oropharynx is clear and moist and mucous membranes are normal. Mucous membranes are not pale, not dry and not cyanotic. No oropharyngeal exudate, posterior oropharyngeal edema, posterior oropharyngeal erythema or tonsillar abscesses. Right TM partially obstructed by cerumen. Left TM with mild erythema, no injected    Eyes: Conjunctivae and EOM are normal. Pupils are equal, round, and reactive to light. Right conjunctiva is not injected. Neck: Normal range of motion. Neck supple. No tracheal deviation present. No thyroid mass present.    Cardiovascular: Normal rate, regular rhythm, S1 normal, S2 normal, normal heart sounds and intact distal pulses. Exam reveals no gallop and no friction rub. No murmur heard. Pulmonary/Chest: Effort normal and breath sounds normal. No respiratory distress. He has no wheezes. He has no rales. He exhibits no tenderness. Musculoskeletal: He exhibits no edema. Lymphadenopathy:     He has no cervical adenopathy. Neurological: He is alert and oriented to person, place, and time. Gait normal.   Skin: Skin is warm and dry. No rash noted. He is not diaphoretic. Psychiatric: Mood and affect normal.   Nursing note and vitals reviewed. Assessment/Plan:     Diagnoses and all orders for this visit:    1. URI with cough and congestion: Day 4 of URI symptoms with cough, No respiratory distress. Discussed this is likely viral process. Encouraged continued conservative therapy. Delayed antibiotics sent to pharmacy to take if symptoms acutely worsen or do not resolve in additional 3-4 days. -     azithromycin (ZITHROMAX) 250 mg tablet; Take 2 tablets today, then take 1 tablet daily      Follow-up Disposition:  Return if symptoms worsen or fail to improve.     Discussed expected course/resolution/complications of diagnosis in detail with patient.    Medication risks/benefits/costs/interactions/alternatives discussed with patient.    Pt was given an after visit summary which includes diagnoses, current medications & vitals.  Pt expressed understanding with the diagnosis and plan

## 2018-11-08 NOTE — PATIENT INSTRUCTIONS
Learning About the Safe Use of Antibiotics  Introduction    Antibiotics are drugs used to kill bacteria. Bacteria can cause infections. These include strep throat, ear infections, and pneumonia. These medicines can't cure everything. They don't kill viruses or help with allergies. They don't help illnesses such as the common cold, the flu, or a runny nose. And they can cause side effects. There are many types of antibiotics. Your doctor will decide which one will work best for your infection. Examples include:  · Amoxicillin. · Cephalexin (Keflex). · Ciprofloxacin (Cipro). What are the possible side effects? Side effects can include:  · Nausea. · Diarrhea. · Skin rash. · Yeast infection. · A severe allergic reaction. It may cause itching, swelling, and breathing problems. This is rare. You may have other side effects or reactions not listed here. Check the information that comes with your medicine. Should you take antibiotics just in case? Don't take antibiotics when you don't need them. If you do that, they may not work when you do need them. Each time you take antibiotics, you are more likely to have some bacteria that survive and aren't killed by the medicine. Bacteria that don't die can change and become even harder to kill. These are called antibiotic-resistant bacteria. They can cause longer and more serious infections. To treat them, you may need different, stronger antibiotics that have more side effects and may cost more. So always ask your doctor if antibiotics are the best treatment. Explain that you do not want antibiotics unless you need them. Help protect the community  Using antibiotics when they're not needed leads to the development of antibiotic-resistant bacteria. These tougher bacteria can spread to family members, children, and coworkers. People in your community will have a risk of getting an infection that is harder to cure and that costs more to treat.   How can you take antibiotics safely? Be safe with medicine. Take your antibiotics as directed. Do not stop taking them just because you feel better. You need to take the full course of medicine. This will help make sure your infection is cured. It will also help prevent the growth of antibiotic-resistant bacteria. Always take the exact amount that the label says to take. If the label says to take the medicine at a certain time, follow those directions. You might feel better after you take an antibiotic for a few days. But it is important to keep taking it for as long as prescribed. That will help you get rid of those bacteria that are a bit stronger and that survive the first few days of treatment. Where can you learn more? Go to http://catalina-zahraa.info/. Enter F695 in the search box to learn more about \"Learning About the Safe Use of Antibiotics. \"  Current as of: November 18, 2017  Content Version: 11.8  © 0798-5528 [employer]. Care instructions adapted under license by [employer] (which disclaims liability or warranty for this information). If you have questions about a medical condition or this instruction, always ask your healthcare professional. [place] [address] any warranty or liability for your use of this information.

## 2018-11-08 NOTE — PROGRESS NOTES
Chief Complaint   Patient presents with    Nasal Congestion     pt has been having yellow green nasal and chest congestion for last 3-4 days. taking Mucinex DM OTC.  Cough     coughing for last 3 days. \"REVIEWED RECORD IN PREPARATION FOR VISIT AND HAVE OBTAINED THE NECESSARY DOCUMENTATION\"  1. Have you been to the ER, urgent care clinic since your last visit? Hospitalized since your last visit? No    2. Have you seen or consulted any other health care providers outside of the [facility] since your last visit? Include any pap smears or colon screening.  No

## 2019-05-20 ENCOUNTER — OFFICE VISIT (OUTPATIENT)
Dept: FAMILY MEDICINE CLINIC | Age: 49
End: 2019-05-20

## 2019-05-20 VITALS
OXYGEN SATURATION: 97 % | BODY MASS INDEX: 32.58 KG/M2 | HEIGHT: 69 IN | DIASTOLIC BLOOD PRESSURE: 90 MMHG | HEART RATE: 67 BPM | WEIGHT: 220 LBS | RESPIRATION RATE: 16 BRPM | SYSTOLIC BLOOD PRESSURE: 135 MMHG | TEMPERATURE: 98.5 F

## 2019-05-20 DIAGNOSIS — L29.9 PRURITUS: Primary | ICD-10-CM

## 2019-05-20 DIAGNOSIS — Z78.9 ALCOHOL USE: ICD-10-CM

## 2019-05-20 RX ORDER — HYDROXYZINE 25 MG/1
25 TABLET, FILM COATED ORAL
Qty: 30 TAB | Refills: 0 | Status: SHIPPED | OUTPATIENT
Start: 2019-05-20 | End: 2019-05-30

## 2019-05-20 NOTE — PATIENT INSTRUCTIONS
Allergies: Care Instructions  Your Care Instructions    Allergies occur when your body's defense system (immune system) overreacts to certain substances. The immune system treats a harmless substance as if it were a harmful germ or virus. Many things can cause this overreaction, including pollens, medicine, food, dust, animal dander, and mold. Allergies can be mild or severe. Mild allergies can be managed with home treatment. But medicine may be needed to prevent problems. Managing your allergies is an important part of staying healthy. Your doctor may suggest that you have allergy testing to help find out what is causing your allergies. When you know what things trigger your symptoms, you can avoid them. This can prevent allergy symptoms and other health problems. For severe allergies that cause reactions that affect your whole body (anaphylactic reactions), your doctor may prescribe a shot of epinephrine to carry with you in case you have a severe reaction. Learn how to give yourself the shot and keep it with you at all times. Make sure it is not . Follow-up care is a key part of your treatment and safety. Be sure to make and go to all appointments, and call your doctor if you are having problems. It's also a good idea to know your test results and keep a list of the medicines you take. How can you care for yourself at home? · If you have been told by your doctor that dust or dust mites are causing your allergy, decrease the dust around your bed:  ? Wash sheets, pillowcases, and other bedding in hot water every week. ? Use dust-proof covers for pillows, duvets, and mattresses. Avoid plastic covers because they tear easily and do not \"breathe. \" Wash as instructed on the label. ? Do not use any blankets and pillows that you do not need. ? Use blankets that you can wash in your washing machine. ? Consider removing drapes and carpets, which attract and hold dust, from your bedroom.   · If you are allergic to house dust and mites, do not use home humidifiers. Your doctor can suggest ways you can control dust and mites. · Look for signs of cockroaches. Cockroaches cause allergic reactions. Use cockroach baits to get rid of them. Then, clean your home well. Cockroaches like areas where grocery bags, newspapers, empty bottles, or cardboard boxes are stored. Do not keep these inside your home, and keep trash and food containers sealed. Seal off any spots where cockroaches might enter your home. · If you are allergic to mold, get rid of furniture, rugs, and drapes that smell musty. Check for mold in the bathroom. · If you are allergic to outdoor pollen or mold spores, use air-conditioning. Change or clean all filters every month. Keep windows closed. · If you are allergic to pollen, stay inside when pollen counts are high. Use a vacuum  with a HEPA filter or a double-thickness filter at least two times each week. · Stay inside when air pollution is bad. Avoid paint fumes, perfumes, and other strong odors. · Avoid conditions that make your allergies worse. Stay away from smoke. Do not smoke or let anyone else smoke in your house. Do not use fireplaces or wood-burning stoves. · If you are allergic to your pets, change the air filter in your furnace every month. Use high-efficiency filters. · If you are allergic to pet dander, keep pets outside or out of your bedroom. Old carpet and cloth furniture can hold a lot of animal dander. You may need to replace them. When should you call for help? Give an epinephrine shot if:    · You think you are having a severe allergic reaction.     · You have symptoms in more than one body area, such as mild nausea and an itchy mouth.    After giving an epinephrine shot call 911, even if you feel better.   Call 911 if:    · You have symptoms of a severe allergic reaction. These may include:  ? Sudden raised, red areas (hives) all over your body. ?  Swelling of the throat, mouth, lips, or tongue. ? Trouble breathing. ? Passing out (losing consciousness). Or you may feel very lightheaded or suddenly feel weak, confused, or restless.     · You have been given an epinephrine shot, even if you feel better.    Call your doctor now or seek immediate medical care if:    · You have symptoms of an allergic reaction, such as:  ? A rash or hives (raised, red areas on the skin). ? Itching. ? Swelling. ? Belly pain, nausea, or vomiting.    Watch closely for changes in your health, and be sure to contact your doctor if:    · You do not get better as expected. Where can you learn more? Go to http://catalina-zahraa.info/. Enter Y468 in the search box to learn more about \"Allergies: Care Instructions. \"  Current as of: June 27, 2018  Content Version: 11.9  © 7856-9793 [employer]. Care instructions adapted under license by [employer] (which disclaims liability or warranty for this information). If you have questions about a medical condition or this instruction, always ask your healthcare professional. [address] any warranty or liability for your use of this information.

## 2019-05-20 NOTE — PROGRESS NOTES
Chief Complaint   Patient presents with    Skin Problem     pt states he has a deep itching from his head to his feet. started last year and got better and now worse again. \"REVIEWED RECORD IN PREPARATION FOR VISIT AND HAVE OBTAINED THE NECESSARY DOCUMENTATION\"  1. Have you been to the ER, urgent care clinic since your last visit? Hospitalized since your last visit? No    2. Have you seen or consulted any other health care providers outside of the [address] [place] [address] since your last visit? Include any pap smears or colon screening.  No

## 2019-05-20 NOTE — PROGRESS NOTES
[facility] Note  Subjective:      Ahmet Reilly is a 51 y.o. male who presents for an acute visit with the following chief complaints. Chief Complaint   Patient presents with    Skin Problem     pt states he has a deep itching from his head to his feet. started last year and got better and now worse again. Rash  Patient complains of itching involving the whole body. Onset 1 year ago, intermittent since but has been daily for the past few weeks. Occurring daily, worse at night. Can be sever severe and irritating. No rashes. Associated sneezing, throat clearing, itchy eyes and ears. History of psoriasis like skin disease but this is very different and will include patches  which have not been present in sometime. No previous evaluations or treatments. Patient has not identified precipitant. Patient has not had new exposures (soaps, lotions, laundry detergents, foods, medications, plants, insects or animals.)  He is worried this is related to alcohol use. Drinking 3-4 whisky drinks nightly. Trying to cut back to 1-2 drinks daily. Current Outpatient Medications   Medication Sig Dispense Refill    B.infantis-B.ani-B.long-B.bifi (PROBIOTIC 4X) 10-15 mg TbEC Take  by mouth.  multivits,Stress Formula-Zinc tablet Take 1 Tab by mouth daily.  hydrOXYzine HCl (ATARAX) 25 mg tablet Take 1 Tab by mouth nightly as needed for Itching for up to 10 days. Indications: itching 30 Tab 0    cholecalciferol, vitamin D3, (VITAMIN D3) 2,000 unit tab Take  by mouth.  omega 3-DHA-EPA-fish oil 1,000 mg (120 mg-180 mg) capsule Take 1 Cap by mouth daily.  magnesium 250 mg tab Take  by mouth daily.  tadalafil (CIALIS) 10 mg tablet Take 1 Tab by mouth daily as needed. 8 Tab 5     No Known Allergies    ROS:   Complete review of systems was reviewed with pertinent information listed in HPI.   Review of Systems   Constitutional: Negative for chills, diaphoresis, fever, malaise/fatigue and weight loss. HENT: Negative for congestion, ear pain, hearing loss, sinus pain, sore throat and tinnitus. Eyes: Negative for blurred vision and double vision. Respiratory: Negative for shortness of breath. Cardiovascular: Negative for chest pain, palpitations and leg swelling. Gastrointestinal: Positive for heartburn. Negative for abdominal pain, blood in stool, constipation, diarrhea, melena, nausea and vomiting. Occasional heartburn   Genitourinary: Negative for dysuria, frequency, hematuria and urgency. Musculoskeletal: Negative for joint pain and myalgias. Skin: Positive for itching. Negative for rash. Neurological: Negative for dizziness, tingling, weakness and headaches. Endo/Heme/Allergies: Positive for environmental allergies. Negative for polydipsia. Psychiatric/Behavioral: Negative. Objective:     Visit Vitals  /90 (BP 1 Location: Left arm, BP Patient Position: Sitting)   Pulse 67   Temp 98.5 °F (36.9 °C) (Oral)   Resp 16   Ht 5' 9\" (1.753 m)   Wt 220 lb (99.8 kg)   SpO2 97%   BMI 32.49 kg/m²       Vitals and Nurse Documentation reviewed. Physical Exam   Constitutional: He is oriented to person, place, and time and well-developed, well-nourished, and in no distress. Vital signs are normal. He does not have a sickly appearance. HENT:   Head: Normocephalic and atraumatic. Right Ear: Hearing, tympanic membrane, external ear and ear canal normal. Tympanic membrane is not erythematous and not bulging. No middle ear effusion. Left Ear: Hearing, external ear and ear canal normal. Tympanic membrane is not erythematous and not bulging. No middle ear effusion. Nose: Mucosal edema present. No rhinorrhea, nasal deformity or septal deviation. Right sinus exhibits no maxillary sinus tenderness and no frontal sinus tenderness. Left sinus exhibits no maxillary sinus tenderness and no frontal sinus tenderness.    Mouth/Throat: Uvula is midline, oropharynx is clear and moist and mucous membranes are normal. Mucous membranes are not pale, not dry and not cyanotic. Normal dentition. No dental caries. No oropharyngeal exudate, posterior oropharyngeal edema, posterior oropharyngeal erythema or tonsillar abscesses. Eyes: Pupils are equal, round, and reactive to light. Conjunctivae, EOM and lids are normal. Right conjunctiva is not injected. Left conjunctiva is not injected. Neck: Normal range of motion and phonation normal. Neck supple. No tracheal deviation present. No thyroid mass and no thyromegaly present. Cardiovascular: Normal rate, regular rhythm, S1 normal, S2 normal, normal heart sounds and intact distal pulses. Exam reveals no gallop and no friction rub. No murmur heard. Pulses:       Radial pulses are 2+ on the right side, and 2+ on the left side. Dorsalis pedis pulses are 2+ on the right side, and 2+ on the left side. Pulmonary/Chest: Effort normal and breath sounds normal. No accessory muscle usage. No respiratory distress. He has no decreased breath sounds. He has no wheezes. He has no rhonchi. He has no rales. He exhibits no tenderness. Abdominal: Soft. Normal appearance and bowel sounds are normal. He exhibits no distension, no abdominal bruit and no mass. There is no hepatosplenomegaly. There is no tenderness. There is no rebound and no guarding. Musculoskeletal: Normal range of motion. He exhibits no edema, tenderness or deformity. Lymphadenopathy:        Head (right side): No submental, no submandibular, no tonsillar, no preauricular, no posterior auricular and no occipital adenopathy present. Head (left side): No submental, no submandibular, no tonsillar, no preauricular, no posterior auricular and no occipital adenopathy present. He has no cervical adenopathy. Right: No supraclavicular adenopathy present. Left: No supraclavicular adenopathy present.    Neurological: He is alert and oriented to person, place, and time. He has normal sensation, normal strength, normal reflexes and intact cranial nerves. Gait normal.   Skin: Skin is warm, dry and intact. No rash noted. He is not diaphoretic. No cyanosis. Nails show no clubbing. Psychiatric: Mood and affect normal.   Nursing note and vitals reviewed. Assessment/Plan:     Diagnoses and all orders for this visit:    1. Pruritus: Chronic issue, without rash or constitutional symptoms. Unclear etiology. Possible allergy mediated. Start daily antihistamine. Advised to use atarax as needed for severe symptoms. RTC if symptoms worsen or do not resolve. -     hydrOXYzine HCl (ATARAX) 25 mg tablet; Take 1 Tab by mouth nightly as needed for Itching for up to 10 days. Indications: itching    2. Alcohol use: Discussed risk of continued increase alcohol use. Advised no more than 2 drinks per occasion and no more than 14 drinks per week. Patient states understanding. Patient declines additional resources for counseling at this time, he states he can decrease use. Follow-up with CPE with PCP scheduled. -     METABOLIC PANEL, COMPREHENSIVE      Follow-up and Dispositions    · Return if symptoms worsen or fail to improve.          Discussed expected course/resolution/complications of diagnosis in detail with patient.    Medication risks/benefits/costs/interactions/alternatives discussed with patient.    Pt was given an after visit summary which includes diagnoses, current medications & vitals.  Pt expressed understanding with the diagnosis and plan

## 2019-05-21 LAB
ALBUMIN SERPL-MCNC: 4.6 G/DL (ref 3.5–5.5)
ALBUMIN/GLOB SERPL: 2.1 {RATIO} (ref 1.2–2.2)
ALP SERPL-CCNC: 63 IU/L (ref 39–117)
ALT SERPL-CCNC: 30 IU/L (ref 0–44)
AST SERPL-CCNC: 23 IU/L (ref 0–40)
BILIRUB SERPL-MCNC: 0.6 MG/DL (ref 0–1.2)
BUN SERPL-MCNC: 28 MG/DL (ref 6–24)
BUN/CREAT SERPL: 24 (ref 9–20)
CALCIUM SERPL-MCNC: 9.8 MG/DL (ref 8.7–10.2)
CHLORIDE SERPL-SCNC: 104 MMOL/L (ref 96–106)
CO2 SERPL-SCNC: 24 MMOL/L (ref 20–29)
CREAT SERPL-MCNC: 1.15 MG/DL (ref 0.76–1.27)
GLOBULIN SER CALC-MCNC: 2.2 G/DL (ref 1.5–4.5)
GLUCOSE SERPL-MCNC: 106 MG/DL (ref 65–99)
POTASSIUM SERPL-SCNC: 4.6 MMOL/L (ref 3.5–5.2)
PROT SERPL-MCNC: 6.8 G/DL (ref 6–8.5)
SODIUM SERPL-SCNC: 143 MMOL/L (ref 134–144)

## 2019-10-14 ENCOUNTER — OFFICE VISIT (OUTPATIENT)
Dept: FAMILY MEDICINE CLINIC | Age: 49
End: 2019-10-14

## 2019-10-14 VITALS
BODY MASS INDEX: 32.97 KG/M2 | WEIGHT: 222.6 LBS | HEIGHT: 69 IN | RESPIRATION RATE: 20 BRPM | HEART RATE: 64 BPM | SYSTOLIC BLOOD PRESSURE: 130 MMHG | TEMPERATURE: 96.6 F | DIASTOLIC BLOOD PRESSURE: 88 MMHG

## 2019-10-14 DIAGNOSIS — Z00.00 PHYSICAL EXAM: Primary | ICD-10-CM

## 2019-10-14 DIAGNOSIS — Z23 ENCOUNTER FOR IMMUNIZATION: ICD-10-CM

## 2019-10-14 DIAGNOSIS — R73.9 ELEVATED BLOOD SUGAR: ICD-10-CM

## 2019-10-14 DIAGNOSIS — E66.9 OBESITY, CLASS I, BMI 30-34.9: ICD-10-CM

## 2019-10-14 DIAGNOSIS — L29.9 PRURITUS OF SKIN: ICD-10-CM

## 2019-10-14 DIAGNOSIS — E55.9 VITAMIN D DEFICIENCY: ICD-10-CM

## 2019-10-14 NOTE — PROGRESS NOTES
Chief Complaint   Patient presents with    Complete Physical    Immunization/Injection     Flu shot     Spoke to patient Identified pt with two pt identifiers (Name @ )    1. Have you been to the ER, urgent care clinic since your last visit? Hospitalized since your last visit? No    2. Have you seen or consulted any other health care providers outside of the [address] [place] [address] since your last visit? Include any pap smears or colon screening. No     \"REVIEWED RECORD IN PREPARATION FOR VISIT AND HAVE OBTAINED NECESSARY DOCUMENTATION\"        Hien Burks is a 47 y.o. male who presents for routine immunizations. He denies any symptoms , reactions or allergies that would exclude them from being immunized today. Risks and adverse reactions were discussed and the VIS was given to them. All questions were addressed. He was observed for 5  min post injection. There were no reactions observed.     Virgen Munoz LPN

## 2019-10-14 NOTE — PATIENT INSTRUCTIONS
Vaccine Information Statement    Influenza (Flu) Vaccine (Inactivated or Recombinant): What You Need to Know    Many Vaccine Information Statements are available in [place] and other languages. See www.immunize.org/vis  Hojas de información sobre vacunas están disponibles en español y en muchos otros idiomas. Visite www.immunize.org/vis    1. Why get vaccinated? Influenza vaccine can prevent influenza (flu). Flu is a contagious disease that spreads around the [facility] every year, usually between October and May. Anyone can get the flu, but it is more dangerous for some people. Infants and young children, people 72years of age and older, pregnant women, and people with certain health conditions or a weakened immune system are at greatest risk of flu complications. Pneumonia, bronchitis, sinus infections and ear infections are examples of flu-related complications. If you have a medical condition, such as heart disease, cancer or diabetes, flu can make it worse. Flu can cause fever and chills, sore throat, muscle aches, fatigue, cough, headache, and runny or stuffy nose. Some people may have vomiting and diarrhea, though this is more common in children than adults. Each year thousands of people in the [facility] die from flu, and many more are hospitalized. Flu vaccine prevents millions of illnesses and flu-related visits to the doctor each year. 2. Influenza vaccines     CDC recommends everyone 10months of age and older get vaccinated every flu season. Children 6 months through 6years of age may need 2 doses during a single flu season. Everyone else needs only 1 dose each flu season. It takes about 2 weeks for protection to develop after vaccination. There are many flu viruses, and they are always changing. Each year a new flu vaccine is made to protect against three or four viruses that are likely to cause disease in the upcoming flu season.  Even when the vaccine doesnt exactly match these viruses, it may still provide some protection. Influenza vaccine does not cause flu. Influenza vaccine may be given at the same time as other vaccines. 3. Talk with your health care provider    Tell your vaccine provider if the person getting the vaccine:   Has had an allergic reaction after a previous dose of influenza vaccine, or has any severe, life-threatening allergies.  Has ever had Guillain-Barré Syndrome (also called GBS). In some cases, your health care provider may decide to postpone influenza vaccination to a future visit. People with minor illnesses, such as a cold, may be vaccinated. People who are moderately or severely ill should usually wait until they recover before getting influenza vaccine. Your health care provider can give you more information. 4. Risks of a reaction     Soreness, redness, and swelling where shot is given, fever, muscle aches, and headache can happen after influenza vaccine.  There may be a very small increased risk of Guillain-Barré Syndrome (GBS) after inactivated influenza vaccine (the flu shot). [employer] children who get the flu shot along with pneumococcal vaccine (PCV13), and/or DTaP vaccine at the same time might be slightly more likely to have a seizure caused by fever. Tell your health care provider if a child who is getting flu vaccine has ever had a seizure. People sometimes faint after medical procedures, including vaccination. Tell your provider if you feel dizzy or have vision changes or ringing in the ears. As with any medicine, there is a very remote chance of a vaccine causing a severe allergic reaction, other serious injury, or death. 5. What if there is a serious problem? An allergic reaction could occur after the vaccinated person leaves the clinic.  If you see signs of a severe allergic reaction (hives, swelling of the face and throat, difficulty breathing, a fast heartbeat, dizziness, or weakness), call 9-1-1 and get the person to the nearest hospital.    For other signs that concern you, call your health care provider. Adverse reactions should be reported to the Vaccine Adverse Event Reporting System (VAERS). Your health care provider will usually file this report, or you can do it yourself. Visit the VAERS website at www.vaers. [facility].gov or call 9-546.882.2953. VAERS is only for reporting reactions, and VAERS staff do not give medical advice. 6. The National Vaccine Injury Compensation Program    The [facility] Vaccine Injury Compensation Program (VICP) is a federal program that was created to compensate people who may have been injured by certain vaccines. Visit the VICP website at www.[facility].gov/vaccinecompensation or call 5-507.602.6410 to learn about the program and about filing a claim. There is a time limit to file a claim for compensation. 7. How can I learn more?  Ask your health care provider.  Call your local or state health department.  Contact the Centers for Disease Control and Prevention (CDC):  - Call 5-727.171.8033 (1-800-CDC-INFO) or  - Visit CDCs influenza website at www.cdc.gov/flu    Vaccine Information Statement (Interim)  Inactivated Influenza Vaccine   8/15/2019  42 BRITTANI Nettles 851PT-32   Department of Health and Human Services  Centers for Disease Control and Prevention    Office Use Only

## 2019-10-14 NOTE — PROGRESS NOTES
HPI  Otis Jay 47 y.o. male  presents to the office today for CPE. Blood pressure 130/88, pulse 64, temperature 96.6 °F (35.9 °C), temperature source Oral, resp. rate 20, height 5' 9\" (1.753 m), weight 222 lb 9.6 oz (101 kg). Body mass index is 32.87 kg/m². Chief Complaint   Patient presents with    Complete Physical    Immunization/Injection     Flu shot        Vitamin D deficiency: Pt's vitamin D levels were 41.0 on 5/23/18. Pt continues with Cholecalciferol. Obesity: I have reviewed/discussed the above normal BMI with the patient. Health Maintenance: Pt will receive flu vaccine today. Discussed with pt the importance of Shingrix vaccine and informed pt that wait time could take up to 3 months. Current Outpatient Medications   Medication Sig Dispense Refill    varicella-zoster recombinant, PF, (SHINGRIX, PF,) 50 mcg/0.5 mL susr injection 0.5 mL by IntraMUSCular route once for 1 dose. 0.5 mL 0    B.infantis-B.ani-B.long-B.bifi (PROBIOTIC 4X) 10-15 mg TbEC Take  by mouth.  multivits,Stress Formula-Zinc tablet Take 1 Tab by mouth daily.  cholecalciferol, vitamin D3, (VITAMIN D3) 2,000 unit tab Take  by mouth.  omega 3-DHA-EPA-fish oil 1,000 mg (120 mg-180 mg) capsule Take 1 Cap by mouth daily.  magnesium 250 mg tab Take  by mouth daily.  tadalafil (CIALIS) 10 mg tablet Take 1 Tab by mouth daily as needed.  8 Tab 5     No Known Allergies  Past Medical History:   Diagnosis Date    ADD (attention deficit disorder)     Alcohol use     Arthritis     left knee    ED (erectile dysfunction)     Preglaucoma of both eyes     Psoriasis/like disorders 02/2017    Manged by [employer]ance [employer]     Past Surgical History:   Procedure Laterality Date    HX ORTHOPAEDIC       Family History   Problem Relation Age of Onset   [address] [facility] Neri Glaucoma Father     Hypertension Father     Stroke Sister      Social History     Tobacco Use    Smoking status: Never Smoker    Smokeless tobacco: Never Used   [address] [facility] Neri Tobacco comment: occasional    Substance Use Topics    Alcohol use: Yes     Alcohol/week: 12.5 standard drinks     Types: 15 Standard drinks or equivalent per week     Frequency: 4 or more times a week     Drinks per session: 3 or 4     Comment: 3-4 drinks per day        Review of Systems   Constitutional: Negative for chills and fever. HENT: Negative for hearing loss and tinnitus. Eyes: Negative for blurred vision and double vision. Respiratory: Negative for cough and shortness of breath. Cardiovascular: Negative for chest pain and palpitations. Gastrointestinal: Negative for blood in stool, nausea and vomiting. Genitourinary: Negative for dysuria and frequency. Musculoskeletal: Negative for back pain and falls. Skin: Negative for itching and rash. Neurological: Negative for dizziness, loss of consciousness and headaches. Psychiatric/Behavioral: Negative for depression. The patient is not nervous/anxious. Physical Exam   Constitutional: He is oriented to person, place, and time. He appears well-developed and well-nourished. HENT:   Head: Normocephalic and atraumatic. Right Ear: External ear normal.   Left Ear: External ear normal.   Nose: Nose normal.   Mouth/Throat: Oropharynx is clear and moist.   Eyes: Conjunctivae and EOM are normal.   Neck: Normal range of motion. Neck supple. Cardiovascular: Normal rate, regular rhythm, normal heart sounds and intact distal pulses. Pulmonary/Chest: Effort normal and breath sounds normal.   Abdominal: Soft. Bowel sounds are normal.   Genitourinary:   Genitourinary Comments: Enlarged, symmetrical, smooth with no nodules; guaiac negative. Musculoskeletal: Normal range of motion. Neurological: He is alert and oriented to person, place, and time. Skin: Skin is warm and dry. Psychiatric: He has a normal mood and affect. His behavior is normal. Judgment and thought content normal.   Nursing note and vitals reviewed.         ASSESSMENT and PLAN  Diagnoses and all orders for this visit:    1. Physical exam  -     METABOLIC PANEL, COMPREHENSIVE  -     CBC WITH AUTOMATED DIFF  -     LIPID PANEL  -     TSH 3RD GENERATION  -     T4, FREE  -     URINALYSIS W/MICROSCOPIC  -     PSA W/ REFLX FREE PSA  -     VITAMIN D, 25 HYDROXY    2. Obesity, Class I, BMI 30-34.9  I have reviewed/discussed the above normal BMI with the patient. I have recommended the following interventions: dietary management education, guidance, and counseling, encourage exercise and monitor weight . 3. Encounter for immunization  Flu vaccine administered today in office. Provided pt with prescription and will follow up with local pharmacy for Shingrix vaccine. -     INFLUENZA VIRUS VAC QUAD,SPLIT,PRESV FREE SYRINGE IM  -     KS IMMUNIZ ADMIN,1 SINGLE/COMB VAC/TOXOID  -     varicella-zoster recombinant, PF, (SHINGRIX, PF,) 50 mcg/0.5 mL susr injection; 0.5 mL by IntraMUSCular route once for 1 dose. 4. Vitamin D deficiency  Presumed stable, will assess levels today. Pt continues with Cholecalciferol.   -     VITAMIN D, 25 HYDROXY    Follow-up and Dispositions    · Return in about 1 year (around 10/14/2020) for physical exam.        Medication risks/benefits/costs/interactions/alternatives discussed with patient. Advised patient to call back or return to office if symptoms worsen/change/persist.  If patient cannot reach us or should anything more severe/urgent arise he/she should proceed directly to the nearest emergency department. Discussed expected course/resolution/complications of diagnosis in detail with patient. Patient given a written after visit summary which includes her diagnoses, current medications and vitals. Patient expressed understanding with the diagnosis and plan. Written by jany Schaefer, as dictated by Jason Chris M.D.    11:47 AM - 12:14 PM    Total time spent with the patient 27 minutes, greater than 50% of time spent counseling patient.

## 2019-12-17 LAB
25(OH)D3+25(OH)D2 SERPL-MCNC: 42.6 NG/ML (ref 30–100)
ALBUMIN SERPL-MCNC: 4.9 G/DL (ref 3.5–5.5)
ALBUMIN/GLOB SERPL: 2.1 {RATIO} (ref 1.2–2.2)
ALP SERPL-CCNC: 62 IU/L (ref 39–117)
ALT SERPL-CCNC: 93 IU/L (ref 0–44)
APPEARANCE UR: CLEAR
AST SERPL-CCNC: 53 IU/L (ref 0–40)
BACTERIA #/AREA URNS HPF: NORMAL /[HPF]
BASOPHILS # BLD AUTO: 0 X10E3/UL (ref 0–0.2)
BASOPHILS NFR BLD AUTO: 1 %
BILIRUB DIRECT SERPL-MCNC: 0.2 MG/DL (ref 0–0.4)
BILIRUB SERPL-MCNC: 0.8 MG/DL (ref 0–1.2)
BILIRUB UR QL STRIP: NEGATIVE
BUN SERPL-MCNC: 21 MG/DL (ref 6–24)
BUN/CREAT SERPL: 18 (ref 9–20)
CALCIUM SERPL-MCNC: 9.6 MG/DL (ref 8.7–10.2)
CASTS URNS QL MICRO: NORMAL /LPF
CHLORIDE SERPL-SCNC: 101 MMOL/L (ref 96–106)
CHOLEST SERPL-MCNC: 206 MG/DL (ref 100–199)
CO2 SERPL-SCNC: 23 MMOL/L (ref 20–29)
COLOR UR: YELLOW
CREAT SERPL-MCNC: 1.2 MG/DL (ref 0.76–1.27)
EOSINOPHIL # BLD AUTO: 0 X10E3/UL (ref 0–0.4)
EOSINOPHIL NFR BLD AUTO: 1 %
EPI CELLS #/AREA URNS HPF: NORMAL /HPF (ref 0–10)
ERYTHROCYTE [DISTWIDTH] IN BLOOD BY AUTOMATED COUNT: 13 % (ref 12.3–15.4)
EST. AVERAGE GLUCOSE BLD GHB EST-MCNC: 105 MG/DL
FERRITIN SERPL-MCNC: 799 NG/ML (ref 30–400)
GLOBULIN SER CALC-MCNC: 2.3 G/DL (ref 1.5–4.5)
GLUCOSE SERPL-MCNC: 120 MG/DL (ref 65–99)
GLUCOSE UR QL: NEGATIVE
HAV IGM SERPL QL IA: NEGATIVE
HBA1C MFR BLD: 5.3 % (ref 4.8–5.6)
HBV CORE IGM SERPL QL IA: NEGATIVE
HBV SURFACE AG SERPL QL IA: NEGATIVE
HCT VFR BLD AUTO: 49.6 % (ref 37.5–51)
HCV AB S/CO SERPL IA: <0.1 S/CO RATIO (ref 0–0.9)
HDLC SERPL-MCNC: 51 MG/DL
HGB BLD-MCNC: 17.4 G/DL (ref 13–17.7)
HGB UR QL STRIP: NEGATIVE
IGA SERPL-MCNC: 184 MG/DL (ref 90–386)
IGG SERPL-MCNC: 972 MG/DL (ref 700–1600)
IGM SERPL-MCNC: 78 MG/DL (ref 20–172)
IMM GRANULOCYTES # BLD AUTO: 0 X10E3/UL (ref 0–0.1)
IMM GRANULOCYTES NFR BLD AUTO: 1 %
INTERPRETATION, 910389: NORMAL
IRON SERPL-MCNC: 80 UG/DL (ref 38–169)
KETONES UR QL STRIP: NEGATIVE
LDLC SERPL CALC-MCNC: 137 MG/DL (ref 0–99)
LEUKOCYTE ESTERASE UR QL STRIP: NEGATIVE
LYMPHOCYTES # BLD AUTO: 1.5 X10E3/UL (ref 0.7–3.1)
LYMPHOCYTES NFR BLD AUTO: 37 %
MCH RBC QN AUTO: 31.2 PG (ref 26.6–33)
MCHC RBC AUTO-ENTMCNC: 35.1 G/DL (ref 31.5–35.7)
MCV RBC AUTO: 89 FL (ref 79–97)
MICRO URNS: NORMAL
MICRO URNS: NORMAL
MONOCYTES # BLD AUTO: 0.5 X10E3/UL (ref 0.1–0.9)
MONOCYTES NFR BLD AUTO: 11 %
MUCOUS THREADS URNS QL MICRO: PRESENT
NEUTROPHILS # BLD AUTO: 2.1 X10E3/UL (ref 1.4–7)
NEUTROPHILS NFR BLD AUTO: 49 %
NITRITE UR QL STRIP: NEGATIVE
PH UR STRIP: 6 [PH] (ref 5–7.5)
PLATELET # BLD AUTO: 212 X10E3/UL (ref 150–450)
POTASSIUM SERPL-SCNC: 4.4 MMOL/L (ref 3.5–5.2)
PROT PATTERN SERPL IFE-IMP: NORMAL
PROT SERPL-MCNC: 7.2 G/DL (ref 6–8.5)
PROT UR QL STRIP: NEGATIVE
PSA SERPL-MCNC: 1.1 NG/ML (ref 0–4)
RBC # BLD AUTO: 5.58 X10E6/UL (ref 4.14–5.8)
RBC #/AREA URNS HPF: NORMAL /HPF (ref 0–2)
REFLEX CRITERIA: NORMAL
SODIUM SERPL-SCNC: 140 MMOL/L (ref 134–144)
SP GR UR: 1.02 (ref 1–1.03)
T4 FREE SERPL-MCNC: 1.33 NG/DL (ref 0.82–1.77)
TESTOST FREE SERPL-MCNC: 12.2 PG/ML (ref 7.2–24)
TESTOST SERPL-MCNC: 436 NG/DL (ref 264–916)
TRIGL SERPL-MCNC: 89 MG/DL (ref 0–149)
TSH SERPL DL<=0.005 MIU/L-ACNC: 1.01 UIU/ML (ref 0.45–4.5)
UROBILINOGEN UR STRIP-MCNC: 0.2 MG/DL (ref 0.2–1)
VLDLC SERPL CALC-MCNC: 18 MG/DL (ref 5–40)
WBC # BLD AUTO: 4.1 X10E3/UL (ref 3.4–10.8)
WBC #/AREA URNS HPF: NORMAL /HPF (ref 0–5)

## 2019-12-23 ENCOUNTER — HOSPITAL ENCOUNTER (OUTPATIENT)
Dept: ULTRASOUND IMAGING | Age: 49
Discharge: HOME | End: 2019-12-23
Attending: FAMILY MEDICINE
Payer: COMMERCIAL

## 2019-12-23 DIAGNOSIS — K76.0 FATTY LIVER: Primary | ICD-10-CM

## 2019-12-23 DIAGNOSIS — R79.89 ELEVATED LFTS: ICD-10-CM

## 2019-12-23 PROCEDURE — 76700 US EXAM ABDOM COMPLETE: CPT

## 2019-12-23 NOTE — PROGRESS NOTES
We need to set up a FIbroSCAN    Fatty Liver    Real-time sonography of the abdomen was performed with multiple static images of  the liver, gallbladder, pancreas, spleen, kidneys and retroperitoneum obtained.     FINDINGS:  LIVER:   The liver is increased in echogenicity with no mass or other focal abnormality. The liver length is 19 mm.     LIVER VASCULATURE:   The portal vein flow is .     GALLBLADDER:  The gallbladder is normal and no stones are identified. There is no wall  thickening or fluid around the gallbladder.      COMMON BILE DUCT:  There is no biliary duct dilatation but the common duct measures 7 mm in  diameter.      PANCREAS:  The visualized pancreas is normal.     SPLEEN:  The spleen is normal in echotexture and measures 10 cm in length. The splenic  volume is at least 410 mL.     RIGHT KIDNEY:  The right kidney demonstrates normal echogenicity with no mass, stone or  hydronephrosis. The right kidney measures 11.2 cm in length.     LEFT KIDNEY:  The left kidney demonstrates normal echogenicity with no mass, stone or  hydronephrosis. The left kidney measures 12.5 cm in length.      RETROPERITONEUM:  The aorta tapers normally. The IVC is normal.  No retroperitoneal mass is identified.     IMPRESSION  IMPRESSION: Hepatic steatosis. Hepatosplenomegaly.

## 2019-12-24 NOTE — PROGRESS NOTES
Patient aware via my chart of his results I advised will order Fibroscan and will try to get it in before 12/31

## 2019-12-27 ENCOUNTER — TELEPHONE (OUTPATIENT)
Dept: FAMILY MEDICINE CLINIC | Age: 49
End: 2019-12-27

## 2019-12-27 NOTE — TELEPHONE ENCOUNTER
Chief Complaint   Patient presents with    Appointment     Refferal to Dr. Bob Cespedes office. Appointment:  Thursday, July 2, 2020 at 9:00 am .       I have spoken with Dr. Bob Cespedes office and requested appointment for the FibroScan, as well as requested to be seen prior to 12/30/2019-12/31/2019. The first available appointment is Thursday, July 2, 2020 at 9:00 am.  The office has requested office notes and labs for review, if need to be seen earlier will schedule you sooner. I will be faxing Dr. Kari Jarquin notes and labs to their  office. They are aware of your information for contact so they should reach out to you directly for any changes. It is requested day of appointment to arrive 30 minutes early , have photo identification and insurance card. The address is [address] [place] [address]. The office number is [phone]. Faxnumber is [phone].

## 2019-12-30 ENCOUNTER — OFFICE VISIT (OUTPATIENT)
Dept: HEMATOLOGY | Age: 49
End: 2019-12-30

## 2019-12-30 VITALS
RESPIRATION RATE: 16 BRPM | BODY MASS INDEX: 33.33 KG/M2 | DIASTOLIC BLOOD PRESSURE: 83 MMHG | TEMPERATURE: 99 F | SYSTOLIC BLOOD PRESSURE: 127 MMHG | HEIGHT: 69 IN | WEIGHT: 225 LBS | HEART RATE: 100 BPM | OXYGEN SATURATION: 99 %

## 2019-12-30 DIAGNOSIS — R79.89 ELEVATED LFTS: Primary | ICD-10-CM

## 2019-12-30 NOTE — PROGRESS NOTES
Identified pt with two pt identifiers(name and ). Reviewed record in preparation for visit and have obtained necessary documentation. Chief Complaint   Patient presents with    Other     fibroscan        Health Maintenance Due   Topic    Shingrix Vaccine Age 50> (1 of 2)       Coordination of Care Questionnaire:  :   1) Have you been to an emergency room, urgent care, or hospitalized since your last visit? If yes, where when, and reason for visit? no       2. Have seen or consulted any other health care provider since your last visit? If yes, where when, and reason for visit? NO    Patient is accompanied by self I have received verbal consent from Mayela Core to discuss any/all medical information while they are present in the room.

## 2019-12-30 NOTE — PROGRESS NOTES
[address] [facility], MD, MD Karthik Wheeler PA-C Dorsey Neth, [facility]     Zayra Wilkins, [facility]-BC   Frances Sandoval NATALIE Ambrose [facility]-BC       Ana Dial [facility] 136    at [place] [address], [address] [address]    [phone]    FAX: [address]    [facility]    [address], [address]    [address] [facility], [address]    [phone]    FAX: [phone]     Patient Care Team:  Rima Sweet MD as PCP - Miguel Muller MD as PCP - [facility]    Patient Active Problem List   Diagnosis Code    AR (allergic rhinitis) J30.9    ED (erectile dysfunction) N52.9    ADD (attention deficit disorder) F98.8    Primary osteoarthritis of right knee M17.11    Allergic reaction T78.40XA    Vitamin D deficiency E55.9    Obesity, Class I, BMI 30-34.9 E66.9    Family history of premature CAD Z82.49    Psoriasis L40.9     The physician listed above has asked me to see Steven Molina in consultation regarding fatty liver disease and to perform assessment of fibrosis by means of in-office FibroScan analysis. The patient is a 47 y.o.  male who was found to have liver disease in 12/2019. The patient has no symptoms which can be attributed to the liver disorder. The patient has not experienced the following symptoms: pain in the right side over the liver or yellowing of the eyes or skin. The patient completes all daily activities without any functional limitations. ASSESSMENT AND PLAN:  Elevated liver enzymes of uncertain etiology with no fibrosis. His CAP score is consistent with fatty liver disease, but this can also be secondary to alcohol use.  I have suggested he cut back on alcohol intake and lose weight and then re-assess liver enzymes. They should normalize. If they do not, I would refer back to us for work up for other causes of liver disease. Because he has no fibrosis, there is no urgency in getting this worked up at this time. Although he has hepatomegaly, I am not concerned with the ultrasound read at this time. His labs and FibroScan show no signs of advanced liver disease. Assessment of fibrosis was made through performance of FibroScan in the office today. The results of the analysis were shared with the patient in the office at the time of the procedure. A copy of the report was provided to the patient and will be forwarded to the referring medical practice. All questions were answered. The patient expressed a clear understanding of the above. No Known Allergies    Current Outpatient Medications on File Prior to Visit   Medication Sig Dispense Refill    sodium hyaluronate, viscosup, (ORTHOVISC) 30 mg/2 mL syrg injection 30 mg by Intra artICUlar route every 6 months.  B.infantis-B.ani-B.long-B.bifi (PROBIOTIC 4X) 10-15 mg TbEC Take  by mouth.  multivits,Stress Formula-Zinc tablet Take 1 Tab by mouth daily.  cholecalciferol, vitamin D3, (VITAMIN D3) 2,000 unit tab Take 2,000 Units by mouth.  omega 3-DHA-EPA-fish oil 1,000 mg (120 mg-180 mg) capsule Take 1 Cap by mouth daily.  magnesium 250 mg tab Take  by mouth daily.  tadalafil (CIALIS) 10 mg tablet Take 1 Tab by mouth daily as needed. 8 Tab 5     No current facility-administered medications on file prior to visit. SYSTEM REVIEW NOT RELATED TO LIVER DISEASE OR REVIEWED ABOVE:  Constitution systems: Negative for fever, chills, weight gain, weight loss. Eyes: Negative for visual changes. ENT: Negative for sore throat, painful swallowing. Respiratory: Negative for cough, hemoptysis, SOB. Cardiology: Negative for chest pain, palpitations.   GI:  Negative for constipation or diarrhea. : Negative for urinary frequency, dysuria, hematuria, nocturia. Skin: Negative for rash. Hematology: Negative for easy bruising, blood clots. Musculo-skeletal: Negative for back pain, muscle pain, weakness. Neurologic: Negative for headaches, dizziness, vertigo, memory problems not related to HE. Psychology: Negative for anxiety, depression. PHYSICAL EXAMINATION:  Visit Vitals  /83   Pulse 100   Temp 99 °F (37.2 °C) (Tympanic)   Resp 16   Ht 5' 9\" (1.753 m)   Wt 225 lb (102.1 kg)   SpO2 99%   BMI 33.23 kg/m²     General: No acute distress. Skin: No spider angiomata. No jaundice. No palmar erythema. Abdomen: Soft non-tender. Liver size normal to percussion/palpation. Spleen not palpable. No obvious ascites. Extremities: No edema. No muscle wasting. No gross arthritic changes. Neurologic: Alert and oriented. Cranial nerves grossly intact. No asterixis.     LABORATORY STUDIES:  [address] Units 12/12/2019 5/20/2019   WBC 3.4 - 10.8 x10E3/uL 4.1    ANC 1.4 - 7.0 x10E3/uL 2.1    HGB 13.0 - 17.7 g/dL 17.4     - 450 x10E3/uL 212    AST 0 - 40 IU/L 53 (H) 23   ALT 0 - 44 IU/L 93 (H) 30   Alk Phos 39 - 117 IU/L 62 63   Bili, Total 0.0 - 1.2 mg/dL 0.8 0.6   Bili, Direct 0.00 - 0.40 mg/dL 0.20    Albumin 3.5 - 5.5 g/dL 4.9 4.6   BUN 6 - 24 mg/dL 21 28 (H)   Creat 0.76 - 1.27 mg/dL 1.20 1.15   Na 134 - 144 mmol/L 140 143   K 3.5 - 5.2 mmol/L 4.4 4.6   Cl 96 - 106 mmol/L 101 104   CO2 20 - 29 mmol/L 23 24   Glucose 65 - 99 mg/dL 120 (H) 106 (H)     [facility] Latest Ref Rng & Units 5/23/2018   WBC 3.4 - 10.8 x10E3/uL 4.3   ANC 1.4 - 7.0 x10E3/uL 2.3   HGB 13.0 - 17.7 g/dL 16.2    - 450 x10E3/uL 178   AST 0 - 40 IU/L 25   ALT 0 - 44 IU/L 38   Alk Phos 39 - 117 IU/L 61   Bili, Total 0.0 - 1.2 mg/dL 0.8   Bili, Direct 0.00 - 0.40 mg/dL    Albumin 3.5 - 5.5 g/dL 4.7   BUN 6 - 24 mg/dL 24   Creat 0.76 - 1.27 mg/dL 0.96   Na 134 - 144 mmol/L 143   K 3.5 - 5.2 mmol/L 4.1   Cl 96 - 106 mmol/L 103   CO2 20 - 29 mmol/L 21   Glucose 65 - 99 mg/dL 108 (H)     SEROLOGIES:  Not available or performed. Testing will be performed as indicated. LIVER HISTOLOGY:  [facility]2019[facility] EkPa was 4.6. IQR/med 2%. . The results suggested a fibrosis level of F0. The CAP score suggests fatty liver. ENDOSCOPIC PROCEDURES:  Not available or performed    RADIOLOGY:  12/2019. Abdominal ultrasound. Increased echogenicity of liver. Liver span noted as 19 mm but that is likely a typo. If 19 cm, enlarged but no signs of cirrhosis. OTHER TESTING:  Not available or performed    FOLLOW-UP:  All of the issues listed above in the assessment and plan were discussed with the patient. All questions were answered. The patient expressed a clear understanding of the above. The patient will follow-up with the referring physician.     Laura Garza, NATHEN-BC  [facility] [id number] [place] [address], [facility] 22.  [phone]

## 2019-12-31 NOTE — TELEPHONE ENCOUNTER
Patient sent notification that he would be seen on 12/30/19. All information has been faxed prior to his appointment.   Jocelyn Boland LPN

## 2020-01-08 ENCOUNTER — OFFICE VISIT (OUTPATIENT)
Dept: FAMILY MEDICINE CLINIC | Age: 50
End: 2020-01-08

## 2020-01-08 VITALS
HEART RATE: 63 BPM | TEMPERATURE: 98 F | SYSTOLIC BLOOD PRESSURE: 132 MMHG | BODY MASS INDEX: 33.5 KG/M2 | WEIGHT: 226.2 LBS | OXYGEN SATURATION: 99 % | RESPIRATION RATE: 19 BRPM | DIASTOLIC BLOOD PRESSURE: 74 MMHG | HEIGHT: 69 IN

## 2020-01-08 DIAGNOSIS — Z23 ENCOUNTER FOR IMMUNIZATION: ICD-10-CM

## 2020-01-08 DIAGNOSIS — R73.9 ELEVATED BLOOD SUGAR: ICD-10-CM

## 2020-01-08 DIAGNOSIS — Z78.9 ALCOHOL USE: ICD-10-CM

## 2020-01-08 DIAGNOSIS — R79.89 ELEVATED LFTS: Primary | ICD-10-CM

## 2020-01-08 DIAGNOSIS — E66.9 OBESITY, CLASS I, BMI 30-34.9: ICD-10-CM

## 2020-01-08 RX ORDER — PHENTERMINE HYDROCHLORIDE 37.5 MG/1
37.5 TABLET ORAL
Qty: 30 TAB | Refills: 0 | Status: SHIPPED | OUTPATIENT
Start: 2020-01-08 | End: 2020-02-05 | Stop reason: SDUPTHER

## 2020-01-08 NOTE — PROGRESS NOTES
Chief Complaint   Patient presents with    Weight Loss     patient would like to discuss his options for weight loss     1. Have you been to the ER, urgent care clinic since your last visit? Hospitalized since your last visit? No    2. Have you seen or consulted any other health care providers outside of the [address] [place] [address] since your last visit? Include any pap smears or colon screening.  No

## 2020-01-08 NOTE — PROGRESS NOTES
HPI  Pramod Aaron 47 y.o. male  presents to the office today to discuss weight loss. Blood pressure 132/74, pulse 63, temperature 98 °F (36.7 °C), temperature source Oral, resp. rate 19, height 5' 9\" (1.753 m), weight 226 lb 3.2 oz (102.6 kg), SpO2 99 %. Body mass index is 33.4 kg/m². Chief Complaint   Patient presents with    Weight Loss     patient would like to discuss his options for weight loss      Obesity: I have reviewed/discussed the above normal BMI with the patient. Pt states that he has been trying \"desperately\" with dieting and exercising, but nothing has seemed to work effectively. Pt goes to the gym during the week and walks 5-6 miles on the weekend, but he does not notice any improvement in his weight. Pt inquires about something that can help \"jumpstart\" his weight loss. Alcohol use: Pt states that he has been \"battling\" drinking habit; does not think that it is a problem since he only drinks at night for socializing. Pt admits to drinking 2-3 shots of liquors (bourbon) or 2 glasses of wine every night. Pt states that he plans on either \"throwing away\" the bourbon bottle or at least trying to reduce to drinking bourbon only on the weekend. Pt does not think that he is an alcoholic. Pt scored positive on CAGE questionnaire. Elevated BG: A1c 5.3 on 12/12/19. Elevated LFTs: Last ALT of 93 and AST of 53 on 12/12/19. Pt is under care of hepatology NP Nimisha Harvey; last OV was on 12/30/19. Pt reports liver problem bothering him at night. Health Maintenance: Discussed with pt the importance of Shingrix vaccine and informed pt that wait time could take up to 3 months. Current Outpatient Medications   Medication Sig Dispense Refill    phentermine (ADIPEX-P) 37.5 mg tablet Take 1 Tab by mouth every morning. Max Daily Amount: 37.5 mg. 30 Tab 0    varicella-zoster recombinant, PF, (SHINGRIX, PF,) 50 mcg/0.5 mL susr injection 0.5 mL by IntraMUSCular route once for 1 dose.  0.5 mL 1    sodium hyaluronate, viscosup, (ORTHOVISC) 30 mg/2 mL syrg injection 30 mg by Intra artICUlar route every 6 months.  B.infantis-B.ani-B.long-B.bifi (PROBIOTIC 4X) 10-15 mg TbEC Take  by mouth.  cholecalciferol, vitamin D3, (VITAMIN D3) 2,000 unit tab Take 2,000 Units by mouth.  omega 3-DHA-EPA-fish oil 1,000 mg (120 mg-180 mg) capsule Take 1 Cap by mouth daily.  magnesium 250 mg tab Take  by mouth daily.  multivits,Stress Formula-Zinc tablet Take 1 Tab by mouth daily.  tadalafil (CIALIS) 10 mg tablet Take 1 Tab by mouth daily as needed. 8 Tab 5     No Known Allergies  Past Medical History:   Diagnosis Date    ADD (attention deficit disorder)     Alcohol use     ED (erectile dysfunction)     Preglaucoma of both eyes      Past Surgical History:   Procedure Laterality Date    HX ORTHOPAEDIC       Family History   Problem Relation Age of Onset    Glaucoma Father     Hypertension Father     Stroke Sister      Social History     Tobacco Use    Smoking status: Never Smoker    Smokeless tobacco: Never Used    Tobacco comment: occasional    Substance Use Topics    Alcohol use: Yes     Alcohol/week: 12.5 standard drinks     Types: 15 Standard drinks or equivalent per week     Frequency: 4 or more times a week     Drinks per session: 3 or 4     Comment: 3-4 drinks per day        Review of Systems   Constitutional: Negative for chills and fever. HENT: Negative for hearing loss and tinnitus. Eyes: Negative for blurred vision and double vision. Respiratory: Negative for cough and shortness of breath. Cardiovascular: Negative for chest pain and palpitations. Gastrointestinal: Negative for nausea and vomiting. Genitourinary: Negative for dysuria and frequency. Musculoskeletal: Negative for back pain and falls. Skin: Negative for itching and rash. Neurological: Negative for dizziness, loss of consciousness and headaches. Psychiatric/Behavioral: Negative for depression. The patient is not nervous/anxious. Physical Exam  Vitals signs and nursing note reviewed. Constitutional:       Appearance: Normal appearance. He is well-developed. HENT:      Head: Normocephalic and atraumatic. Right Ear: External ear normal.      Left Ear: External ear normal.      Nose: Nose normal.   Eyes:      Conjunctiva/sclera: Conjunctivae normal.      Pupils: Pupils are equal, round, and reactive to light. Neck:      Musculoskeletal: Normal range of motion and neck supple. Cardiovascular:      Rate and Rhythm: Normal rate and regular rhythm. Pulses: Normal pulses. Heart sounds: Normal heart sounds. Pulmonary:      Effort: Pulmonary effort is normal.      Breath sounds: Normal breath sounds. Abdominal:      General: Bowel sounds are normal.      Palpations: Abdomen is soft. Musculoskeletal: Normal range of motion. Skin:     General: Skin is warm and dry. Neurological:      Mental Status: He is alert and oriented to person, place, and time. Psychiatric:         Speech: Speech normal.         Behavior: Behavior normal.         Thought Content: Thought content normal.         Judgment: Judgment normal.           ASSESSMENT and PLAN  Diagnoses and all orders for this visit:    1. Elevated LFTs  Last ALT of 93 and AST of 53 on 12/12/19. Pt is under care of hepatology NP Benji Clements. 2. Obesity, Class I, BMI 30-34.9  Pt will start on Phentermine 37.5 mg; advised pt to take medication with meals. Discussed with pt possible side effects and that pt cannot take this medication for more than 3 months. -     phentermine (ADIPEX-P) 37.5 mg tablet; Take 1 Tab by mouth every morning. Max Daily Amount: 37.5 mg.    3. Elevated blood sugar  Last A1c 5.3.     4. Alcohol use  Positive CAGE questionnaire. Advised pt to at least drink liquor only on weekend or completely quit liquor. The patient was counseled on the dangers of alcohol dependence and was advised to quit.   Reviewed strategies to maximize success, including stress management, support of family/friends and written materials. 5. Encounter for immunization  Provided pt with prescription and will follow up with local pharmacy for Shingrix vaccine. -     varicella-zoster recombinant, PF, (SHINGRIX, PF,) 50 mcg/0.5 mL susr injection; 0.5 mL by IntraMUSCular route once for 1 dose. Follow-up and Dispositions    · Return in about 4 weeks (around 2/5/2020) for obesity follow up. Medication risks/benefits/costs/interactions/alternatives discussed with patient. Advised patient to call back or return to office if symptoms worsen/change/persist.  If patient cannot reach us or should anything more severe/urgent arise he/she should proceed directly to the nearest emergency department. Discussed expected course/resolution/complications of diagnosis in detail with patient. Patient given a written after visit summary which includes her diagnoses, current medications and vitals. Patient expressed understanding with the diagnosis and plan. Written by jany Shirley, as dictated by Lavon [place] M.D.    9:42 AM - 10:02 AM    Total time spent with the patient 20 minutes, greater than 50% of time spent counseling patient.

## 2020-02-05 ENCOUNTER — OFFICE VISIT (OUTPATIENT)
Dept: FAMILY MEDICINE CLINIC | Age: 50
End: 2020-02-05

## 2020-02-05 VITALS
HEIGHT: 69 IN | BODY MASS INDEX: 31.84 KG/M2 | TEMPERATURE: 98.2 F | RESPIRATION RATE: 16 BRPM | WEIGHT: 215 LBS | HEART RATE: 70 BPM | SYSTOLIC BLOOD PRESSURE: 110 MMHG | OXYGEN SATURATION: 98 % | DIASTOLIC BLOOD PRESSURE: 70 MMHG

## 2020-02-05 DIAGNOSIS — E66.9 OBESITY, CLASS I, BMI 30-34.9: ICD-10-CM

## 2020-02-05 DIAGNOSIS — R79.89 ELEVATED LFTS: Primary | ICD-10-CM

## 2020-02-05 RX ORDER — PHENTERMINE HYDROCHLORIDE 37.5 MG/1
37.5 TABLET ORAL
Qty: 30 TAB | Refills: 0 | Status: SHIPPED | OUTPATIENT
Start: 2020-02-05 | End: 2020-03-11 | Stop reason: SDUPTHER

## 2020-02-05 NOTE — PROGRESS NOTES
HPI  Kofi Cai 47 y.o. male  presents to the office today for follow up on weight management. Blood pressure 110/70, pulse 70, temperature 98.2 °F (36.8 °C), temperature source Oral, resp. rate 16, height 5' 9\" (1.753 m), weight 215 lb (97.5 kg), SpO2 98 %. Body mass index is 31.75 kg/m². Chief Complaint   Patient presents with    Weight Management     Obesity :  follow up       Elevated LFTs: Last ALT of 93 and AST of 53 on 12/12/19. Pt is under care of hepatology NP Caffie Senegal; last OV was on 12/30/19. Pt states that since he has been worrying about liver problem, pt works really hard on weight management. Pt adopts Mediterranean diet and spends about 90 min at the gym daily. Pt also admits that he quit daily drinking. His weight went down from 226 lbs in Jan to 215 lbs today in office. Pt states that he has not felt this good for a long time. Pt notes that he wants to get his weight down to 200 lbs and will maintain that weight, so he can go back to drinking alcohol for socializing. Pt also admits that he has been taking some edibles for back pain and wonders if it would contribute to liver problem. Pt also requests for refill of Phentermine since it really helps jump-starting his weight loss progress. Current Outpatient Medications   Medication Sig Dispense Refill    phentermine (ADIPEX-P) 37.5 mg tablet Take 1 Tab by mouth every morning. Max Daily Amount: 37.5 mg. 30 Tab 0    sodium hyaluronate, viscosup, (ORTHOVISC) 30 mg/2 mL syrg injection 30 mg by Intra artICUlar route every 6 months.  B.infantis-B.ani-B.long-B.bifi (PROBIOTIC 4X) 10-15 mg TbEC Take  by mouth.  multivits,Stress Formula-Zinc tablet Take 1 Tab by mouth daily.  cholecalciferol, vitamin D3, (VITAMIN D3) 2,000 unit tab Take 2,000 Units by mouth.  omega 3-DHA-EPA-fish oil 1,000 mg (120 mg-180 mg) capsule Take 1 Cap by mouth daily.  magnesium 250 mg tab Take  by mouth daily.       tadalafil (CIALIS) 10 mg tablet Take 1 Tab by mouth daily as needed. 8 Tab 5     No Known Allergies  Past Medical History:   Diagnosis Date    ADD (attention deficit disorder)     Alcohol use     ED (erectile dysfunction)     Preglaucoma of both eyes      Past Surgical History:   Procedure Laterality Date    HX ORTHOPAEDIC       Family History   Problem Relation Age of Onset    Glaucoma Father     Hypertension Father     Stroke Sister      Social History     Tobacco Use    Smoking status: Never Smoker    Smokeless tobacco: Never Used    Tobacco comment: occasional    Substance Use Topics    Alcohol use: Yes     Alcohol/week: 12.5 standard drinks     Types: 15 Standard drinks or equivalent per week     Frequency: 4 or more times a week     Drinks per session: 3 or 4     Comment: 3-4 drinks per day        Review of Systems   Constitutional: Negative for chills and fever. HENT: Negative for hearing loss and tinnitus. Eyes: Negative for blurred vision and double vision. Respiratory: Negative for cough and shortness of breath. Cardiovascular: Negative for chest pain and palpitations. Gastrointestinal: Negative for nausea and vomiting. Genitourinary: Negative for dysuria and frequency. Musculoskeletal: Negative for back pain and falls. Skin: Negative for itching and rash. Neurological: Negative for dizziness, loss of consciousness and headaches. Psychiatric/Behavioral: Negative for depression. The patient is not nervous/anxious. Physical Exam  Vitals signs and nursing note reviewed. Constitutional:       Appearance: Normal appearance. He is well-developed. HENT:      Head: Normocephalic and atraumatic. Right Ear: External ear normal.      Left Ear: External ear normal.      Nose: Nose normal.   Eyes:      Conjunctiva/sclera: Conjunctivae normal.      Pupils: Pupils are equal, round, and reactive to light. Neck:      Musculoskeletal: Normal range of motion and neck supple.    Cardiovascular: Rate and Rhythm: Normal rate and regular rhythm. Pulses: Normal pulses. Heart sounds: Normal heart sounds. Pulmonary:      Effort: Pulmonary effort is normal.      Breath sounds: Normal breath sounds. Abdominal:      General: Bowel sounds are normal.      Palpations: Abdomen is soft. Musculoskeletal: Normal range of motion. Skin:     General: Skin is warm and dry. Neurological:      Mental Status: He is alert and oriented to person, place, and time. Psychiatric:         Speech: Speech normal.         Behavior: Behavior normal.         Thought Content: Thought content normal.         Judgment: Judgment normal.           ASSESSMENT and PLAN  Diagnoses and all orders for this visit:    1. Elevated LFTs  Discussed with pt that his current regimen is very effective and encouraged him to maintain this healthy lifestyle. Pt does not have to completely give up drinking, but I advised him to drink only on weekends or on special occasions and avoid going back to daily drinking. Edibles should not contribute to liver problem. Pt continues to follow up with liver specialist.   -     METABOLIC PANEL, COMPREHENSIVE; Future  -     LIPID PANEL; Future    2. Obesity, Class I, BMI 30-34.9  Pt continues with Phentermine 37.5 mg for weight loss. -     phentermine (ADIPEX-P) 37.5 mg tablet; Take 1 Tab by mouth every morning. Max Daily Amount: 37.5 mg. Follow-up and Dispositions    · Return in about 3 months (around 5/5/2020) for obesity follow up. Medication risks/benefits/costs/interactions/alternatives discussed with patient. Advised patient to call back or return to office if symptoms worsen/change/persist.  If patient cannot reach us or should anything more severe/urgent arise he/she should proceed directly to the nearest emergency department. Discussed expected course/resolution/complications of diagnosis in detail with patient.     Patient given a written after visit summary which includes diagnoses, current medications and vitals. Patient expressed understanding with the diagnosis and plan. Written by jany Delgado, as dictated by Allison Nelson M.D.    12:25 PM - 12:38 PM    Total time spent with the patient 13 minutes, greater than 50% of time spent counseling patient.

## 2020-02-05 NOTE — PROGRESS NOTES
Chief Complaint   Patient presents with    Weight Management     Obesity :  follow up      1. Have you been to the ER, urgent care clinic since your last visit? Hospitalized since your last visit? No    2. Have you seen or consulted any other health care providers outside of the [facility] since your last visit? Include any pap smears or colon screening.  No

## 2020-02-18 ENCOUNTER — DOCUMENTATION ONLY (OUTPATIENT)
Dept: HEMATOLOGY | Age: 50
End: 2020-02-18

## 2020-03-11 DIAGNOSIS — E66.9 OBESITY, CLASS I, BMI 30-34.9: ICD-10-CM

## 2020-03-11 NOTE — TELEPHONE ENCOUNTER
LOV 2/5/2020  The Prescription Monitoring Program has been reviewed for recent activity regarding controlled substances for this patient.      Per  last refill 2/5/2020 #30

## 2020-03-12 RX ORDER — PHENTERMINE HYDROCHLORIDE 37.5 MG/1
37.5 TABLET ORAL
Qty: 30 TAB | Refills: 0 | Status: SHIPPED | OUTPATIENT
Start: 2020-03-12 | End: 2020-06-09 | Stop reason: SDUPTHER

## 2020-04-24 ENCOUNTER — VIRTUAL VISIT (OUTPATIENT)
Dept: HEMATOLOGY | Age: 50
End: 2020-04-24

## 2020-04-24 DIAGNOSIS — K76.0 FATTY LIVER: Primary | ICD-10-CM

## 2020-04-24 NOTE — PROGRESS NOTES
[facility], MD, MD James Fowler PA-C [facility]otha Broad [facility]     April S Franky [facility]   HARSHIL Moreno-BRITTANY Starr [facility]       [facility]    at [place] [address], [address] [address]    [phone]    FAX: [address]    [facility]    [address], [address]    [address] [facility] [place], [address]    [phone]    FAX: [phone]       Patient Care Team:  Trent Martins MD as PCP - Dioni Benson MD as PCP - [facility] Empaneled Provider      Patient Active Problem List   Diagnosis Code    AR (allergic rhinitis) J30.9    ED (erectile dysfunction) N52.9    ADD (attention deficit disorder) F98.8    Primary osteoarthritis of right knee M17.11    Allergic reaction T78.40XA    Vitamin D deficiency E55.9    Obesity, Class I, BMI 30-34.9 E66.9    Family history of premature CAD Z82.49    Psoriasis L40.9    Elevated LFTs R94.5    Fatty liver K76.0       VIRTUAL TELEHEALTH VISIT PERFORMED DUE TO COVID-19 EPIDEMIC    CONSENT:  Isadora Woodall, who was seen by synchronous, real-time, audio-video technology, and/or his healthcare decision maker, is aware that this patient-initiated, Telehealth encounter on 4/24/2020 is a billable service, with coverage as determined by his insurance carrier. He is aware that he may receive a bill and has provided verbal consent to proceed. This patient was evaluated during a Virtual Telehealth visit. A caregiver was present if appropriate.  Due to this being a TeleHealth encounter performed during the [facility]-79 public health emergency, the physical examination was limited to that listed in the [address] returns to the [facility] of [place] for management of suspected fatty liver. The active problem list, all pertinent past medical history, medications, radiologic findings and laboratory findings related to the liver disorder were reviewed with the patient. The patient is a 47 y.o.  male who was found to have elevated liver enzymes in 12/2019. The most recent imaging of the liver was Ultrasound performed in 12/2019. Results suggest fatty liver disease. Assessment of liver fibrosis with Fibroscan was performed In 12/2019. The result was 4.6 kPa which correlates with   no fibrosis. The CAP score of 356 suggests fatty liver. Since the last office appointment the patient:  The patient went on a Mediterranean diet, significantly reduced alcohol consumption and is now exercising daily. He has lost 21 pounds. The patient has no symptoms which can be attributed to the liver disorder. The patient has not experienced the following symptoms: pain in the right side over the liver or yellowing of the eyes or skin. The patient completes all daily activities without any functional limitations. ASSESSMENT AND PLAN:  Fatty liver  Suspect the patient has fatty liver based upon imaging, Fiboscan CAP score, serologic studies that are negative for other causes of chronic liver disease,   Serologic testing for causes of chronic liver disease were negative for HCV, HBV   The histologic severity has not been defined. Elastography performed in 12/2019 suggests no fibrosis    Fibroscan liver stiffness is normal suggesting that he has NAFL not SMITH. NAFL is a benign form of fatty liver disease and not thought to progress to fibrosis or cirrhosis. Counseling for diet and weight loss in patients with confirmed or suspected NAFLD  The patient has changed his diet and now does daily exercise. He has lost 10% of initial body weight from 215 to 194 pounds.     He is on Mediterranean diet with probiotics and olive oil supplements. 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 Elastography performed in 12/2019 suggests fatty liver consistent with alcohol etiology and no fibrosis    The patient is now consuming only 4 alcoholic drinks per week. This amount of alcohol is perfectly fine. Will perform laboratory testing to monitor liver function and degree of liver injury. This included BMP, hepatic panel, CBC with platelet count,     Will perform imaging of the liver with ultrasound. The need to perform an assessment of liver fibrosis was discussed with the patient. The Fibroscan can assess liver fibrosis and determine if a patient has advanced fibrosis or cirrhosis without the need for liver biopsy. This will be performed at the next office visit. The Fibroscan can be repeated annually or as often as clinically indicated to assess for fibrosis progression and/or regression. If he again has no fibrosis on Fibroscan will see him for follow-up in 1 year. If again no fibrosis then no further follow-up is necessary. Elevation in Ferritin  There is an elevation in ferritin   The patient is unlikely to have hemochromatosis but may be a carrier for an HFE gene. Most likely the elevation in ferritin is due to fatty liver and previous alcohol use. Now that he has lost weight much of hepatic steatosis has probably resolved and with this ferritin will decline  Now that he has significantly reduced alcohol the ferritin will also come down  Will repeat ferritin and FE saturation    Treatment of other medical problems in patients with chronic liver disease  There are no contraindications for the patient to take most medications that are necessary for treatment of other medical issues.     Vaccinations   The need for vaccination against viral hepatitis A and B will be assessed with serologic and instituted as appropriate. Routine vaccinations against other bacterial and viral agents can be performed as indicated. Annual flu vaccination should be administered if indicated. ALLERGIES:  No Known Allergies    MEDICATIONS  Current Outpatient Medications on File Prior to Visit   Medication Sig Dispense Refill    phentermine (ADIPEX-P) 37.5 mg tablet Take 1 Tab by mouth every morning. Max Daily Amount: 37.5 mg. 30 Tab 0    sodium hyaluronate, viscosup, (ORTHOVISC) 30 mg/2 mL syrg injection 30 mg by Intra artICUlar route every 6 months.  B.infantis-B.ani-B.long-B.bifi (PROBIOTIC 4X) 10-15 mg TbEC Take  by mouth.  multivits,Stress Formula-Zinc tablet Take 1 Tab by mouth daily.  tadalafil (CIALIS) 10 mg tablet Take 1 Tab by mouth daily as needed. 8 Tab 5    cholecalciferol, vitamin D3, (VITAMIN D3) 2,000 unit tab Take 2,000 Units by mouth.  omega 3-DHA-EPA-fish oil 1,000 mg (120 mg-180 mg) capsule Take 1 Cap by mouth daily.  magnesium 250 mg tab Take  by mouth daily. No current facility-administered medications on file prior to visit. SYSTEM REVIEW NOT RELATED TO LIVER DISEASE OR REVIEWED ABOVE:  Constitution systems: Negative for fever, chills, weight gain, weight loss. Eyes: Negative for visual changes. ENT: Negative for sore throat, painful swallowing. Respiratory: Negative for cough, hemoptysis, SOB. Cardiology: Negative for chest pain, palpitations. GI:  Negative for constipation or diarrhea. : Negative for urinary frequency, dysuria, hematuria, nocturia. Skin: Negative for rash. Hematology: Negative for easy bruising, blood clots. Musculo-skeletal: Negative for back pain, muscle pain, weakness. Neurologic: Negative for headaches, dizziness, vertigo, memory problems not related to HE. Psychology: Negative for anxiety, depression.      FAMILY HISTORY:  The father  Has/had the following following chronic disease(s): None. The mother Has/had the following chronic disease(s): None. There is no family history of liver disease. SOCIAL HISTORY:  The patient is . The patient has 2 children,   The patient has never used tobacco products. The patient has previously consumed 3-4 drinks per day. Now consumes 4 alcohol beverages per week. The patient currently works full time as . PHYSICAL EXAMINATION PERFORMED BY VIRTUAL TELEHEALTH:  VS: Not performed   General: No acute distress. Eyes: Sclera anicteric. ENT: No oral lesions. Skin: No rashes. spider angiomata. No jaundice. Abdomen: No obvious distention suggesting ascites. Extremities: No edema. No muscle wasting. Neurologic: Alert and oriented. Cranial nerves grossly intact. LABORATORY STUDIES:  Liver [place] of [address] Units 12/12/2019 5/20/2019   WBC 3.4 - 10.8 x10E3/uL 4.1    ANC 1.4 - 7.0 x10E3/uL 2.1    HGB 13.0 - 17.7 g/dL 17.4     - 450 x10E3/uL 212    AST 0 - 40 IU/L 53 (H) 23   ALT 0 - 44 IU/L 93 (H) 30   Alk Phos 39 - 117 IU/L 62 63   Bili, Total 0.0 - 1.2 mg/dL 0.8 0.6   Bili, Direct 0.00 - 0.40 mg/dL 0.20    Albumin 3.5 - 5.5 g/dL 4.9 4.6   BUN 6 - 24 mg/dL 21 28 (H)   Creat 0.76 - 1.27 mg/dL 1.20 1.15   Na 134 - 144 mmol/L 140 143   K 3.5 - 5.2 mmol/L 4.4 4.6   Cl 96 - 106 mmol/L 101 104   CO2 20 - 29 mmol/L 23 24   Glucose 65 - 99 mg/dL 120 (H) 106 (H)     SEROLOGIES:  12/2019. HBsurface antigen negative, anti-HCV negative, Ferritin 799    LIVER HISTOLOGY:  12/2019. FibroScan performed at [address]. EkPa was 4.6. IQR/med 2%. . The results suggested a fibrosis level of F0. The CAP score suggests fatty liver. ENDOSCOPIC PROCEDURES:  Not available or performed    RADIOLOGY:  12/2019. Abdominal ultrasound. Increased echogenicity of liver. Liver span noted as 19 mm but that is likely a typo. If 19 cm, enlarged but no signs of cirrhosis. OTHER TESTING:  Not available or performed    FOLLOW-UP:  Pursuant to the emergency declaration under the [facility], [facility] waiver authority and the [employer] Act, this Virtual  Visit was conducted, with the patient's (and/or their legal guardian's) consent, to reduce the patient's risk of exposure to COVID-19 and provide necessary medical care. Services were provided through a video synchronous discussion virtually to substitute for an in-person clinic visit. The patient was located in their home. The provider was located in the [place] [address] office. Because of the COVID-19 epidemic all non-emergent diagnostic testing and the in-office visit will be delayed by several months to reduce the risk of patient exposure to and potential infection from the novel corona virus. This follow-up appointment may be delayed further if warranted by the status of the epidemic at that time. Orders to obtain laboratory testing will be mailed to the patient and obtained 1 week prior to the in-person appointment. [address] [place] [address] in 3 months for Fibroscan to review all data and determine the treatment plan.       Jay Ortega MD  [facility] of [address], [address] [facility] [address].  [phone]  [address] [facility]

## 2020-04-29 ENCOUNTER — VIRTUAL VISIT (OUTPATIENT)
Dept: FAMILY MEDICINE CLINIC | Age: 50
End: 2020-04-29

## 2020-04-29 DIAGNOSIS — F41.9 ANXIETY: Primary | ICD-10-CM

## 2020-04-29 RX ORDER — ALPRAZOLAM 0.25 MG/1
0.25 TABLET ORAL
Qty: 20 TAB | Refills: 0 | Status: SHIPPED | OUTPATIENT
Start: 2020-04-29 | End: 2020-11-29

## 2020-04-29 NOTE — PROGRESS NOTES
Amiel Primrose is a 47 y.o. male who was seen by synchronous (real-time) audio-video technology on 4/29/2020. Consent:  Services were provided through a video synchronous discussion virtually to substitute for in-person appointment. He and/or his healthcare decision maker is aware that this patient-initiated Telehealth encounter is a billable service, with coverage as determined by his insurance carrier. He is aware that he may receive a bill and has provided verbal consent to proceed: Yes    I was in the office while conducting this encounter. Subjective:   Amiel Primrose was seen to discuss stress and concern for his son. Pt states that he is very stressed due to personal circumstances. Pt is very worried about his son, as his mental and physical health have decreased as he mourns for one of his friends. Pt's son has not been eating or sleeping well. With the stress for his family and COVID-19, pt is feeling stressed. As for pt's son, recommended that he see LCSW Lázaro Ferreira for counseling to help pt's son through his grief. Pt asked if it would be okay to give son ambien 5mg for sleep, and I said it was okay. Discussed with pt about his son's conditions. Discussed safety practices under COVID-19 circumstances as loved ones come to help pt's son through his grief. Advised counseling for son and prescribed xanax for pt. Prior to Admission medications    Medication Sig Start Date End Date Taking? Authorizing Provider   ALPRAZolam Eliane Sanchez) 0.25 mg tablet Take 1 Tab by mouth nightly as needed for Anxiety or Sleep. Max Daily Amount: 0.25 mg. 4/29/20  Yes Hannah Santana MD   phentermine (ADIPEX-P) 37.5 mg tablet Take 1 Tab by mouth every morning. Max Daily Amount: 37.5 mg. 3/12/20   Cande Pop MD   sodium hyaluronate, viscosup, (ORTHOVISC) 30 mg/2 mL syrg injection 30 mg by Intra artICUlar route every 6 months.  10/14/19   Provider, Historical   B.infantis-B.ani-B.long-B.bifi (PROBIOTIC 4X) 10-15 mg TbEC Take  by mouth. Provider, Historical   multivits,Stress Formula-Zinc tablet Take 1 Tab by mouth daily. Provider, Historical   tadalafil (CIALIS) 10 mg tablet Take 1 Tab by mouth daily as needed. 8/22/18   Diogenes Pop MD   cholecalciferol, vitamin D3, (VITAMIN D3) 2,000 unit tab Take 2,000 Units by mouth. Provider, Historical   omega 3-DHA-EPA-fish oil 1,000 mg (120 mg-180 mg) capsule Take 1 Cap by mouth daily. Provider, Historical   magnesium 250 mg tab Take  by mouth daily. Provider, Historical     No Known Allergies  Past Medical History:   Diagnosis Date    ADD (attention deficit disorder)     Alcohol use     ED (erectile dysfunction)     Preglaucoma of both eyes      Past Surgical History:   Procedure Laterality Date    HX ORTHOPAEDIC       Family History   Problem Relation Age of Onset    Glaucoma Father     Hypertension Father     Stroke Sister      Social History     Tobacco Use    Smoking status: Never Smoker    Smokeless tobacco: Never Used    Tobacco comment: occasional    Substance Use Topics    Alcohol use: Yes     Alcohol/week: 12.5 standard drinks     Types: 15 Standard drinks or equivalent per week     Frequency: 4 or more times a week     Drinks per session: 3 or 4     Comment: 3-4 drinks per day        Review of Systems   Constitutional: Negative for chills and fever. HENT: Negative for hearing loss and tinnitus. Eyes: Negative for blurred vision and double vision. Respiratory: Negative for cough and shortness of breath. Cardiovascular: Negative for chest pain and palpitations. Gastrointestinal: Negative for nausea and vomiting. Genitourinary: Negative for dysuria and frequency. Musculoskeletal: Negative for back pain and falls. Skin: Negative for itching and rash. Neurological: Negative for dizziness, loss of consciousness and headaches. Endo/Heme/Allergies: Negative. Psychiatric/Behavioral: Negative for depression.  The patient is nervous/anxious. PHYSICAL EXAMINATION:  Vital Signs: (As obtained by patient/caregiver at home)  There were no vitals taken for this visit. Constitutional: [x] Appears well-developed and well-nourished [x] No apparent distress      [] Abnormal -     Mental status: [x] Alert and awake  [x] Oriented to person/place/time [x] Able to follow commands    [] Abnormal -     Eyes:   EOM    [x]  Normal    [] Abnormal -   Sclera  [x]  Normal    [] Abnormal -          Discharge [x]  None visible   [] Abnormal -     HENT: [x] Normocephalic, atraumatic  [] Abnormal -   [x] Mouth/Throat: Mucous membranes are moist    External Ears [x] Normal  [] Abnormal -    Neck: [x] No visualized mass [] Abnormal -     Pulmonary/Chest: [x] Respiratory effort normal   [x] No visualized signs of difficulty breathing or respiratory distress        [] Abnormal -      Musculoskeletal:   [x] Normal gait with no signs of ataxia         [x] Normal range of motion of neck        [] Abnormal -     Neurological:        [x] No Facial Asymmetry (Cranial nerve 7 motor function) (limited exam due to video visit)          [x] No gaze palsy        [] Abnormal -          Skin:        [x] No significant exanthematous lesions or discoloration noted on facial skin         [] Abnormal -            Psychiatric:       [x] Normal Affect [] Abnormal -        [x] No Hallucinations    Other pertinent observable physical exam findings:-    Assessment & Plan:   Diagnoses and all orders for this visit:    1. Anxiety  Pt advised to take xanax 0.25mg PRN to help relieve anxiety. The Prescription Monitoring Program has been reviewed for recent activity regarding controlled substances for this patient. -     ALPRAZolam (XANAX) 0.25 mg tablet; Take 1 Tab by mouth nightly as needed for Anxiety or Sleep. Max Daily Amount: 0.25 mg. As for pt's son, recommended that he see RUBA Cardoza for counseling to help pt's son through his grief.  Pt asked if it would be okay to give son ambien 5mg for sleep, and I said it was okay. Discussed with pt about his son's conditions. Discussed safety practices under COVID-19 circumstances as loved ones come to help pt's son through his grief. Advised counseling for son and prescribed xanax for pt. We discussed the expected course, resolution and complications of the diagnosis(es) in detail. Medication risks, benefits, costs, interactions, and alternatives were discussed as indicated. I advised her to contact the office if her condition worsens, changes or fails to improve as anticipated. She expressed understanding with the diagnosis(es) and plan. Pursuant to the emergency declaration under the [address] and [facility] 113 waiver authority and the [employer] Act, this Virtual Visit was conducted, with patient's consent, to reduce the patient's risk of exposure to COVID-19 and provide continuity of care for an established patient. Services were provided through a video synchronous discussion virtually to substitute for in-person clinic visit. Written by audrey Peralta Ala, as dictated by Belen Deutsch M.D.    2:58 PM -  3:10 PM    Total time spent with the patient 12 minutes, greater than 50% of time spent counseling patient. Follow-up and Dispositions    · Return if symptoms worsen or fail to improve, for anxiety.

## 2020-06-09 ENCOUNTER — PATIENT MESSAGE (OUTPATIENT)
Dept: FAMILY MEDICINE CLINIC | Age: 50
End: 2020-06-09

## 2020-06-09 DIAGNOSIS — E66.9 OBESITY, CLASS I, BMI 30-34.9: ICD-10-CM

## 2020-06-09 RX ORDER — PHENTERMINE HYDROCHLORIDE 37.5 MG/1
37.5 TABLET ORAL
Qty: 30 TAB | Refills: 0 | Status: SHIPPED | OUTPATIENT
Start: 2020-06-09 | End: 2020-07-20 | Stop reason: SDUPTHER

## 2020-06-09 NOTE — TELEPHONE ENCOUNTER
From: Estelle Saavedra  To:  Greg Vasquez MD  Sent: 6/9/2020 8:57 AM EDT  Subject: Prescription Question    Hi Dr Rossi Galvan     I took off a while from phentermerine however without access to the gym and the recent death of a family friend my Weight has nudged up a few pounds and I need a jumpstart until gym opens up July 1     Can you call in a refill I tolerated it very well no side effects thanks     Nicholas Arechiga

## 2020-07-20 DIAGNOSIS — E66.9 OBESITY, CLASS I, BMI 30-34.9: ICD-10-CM

## 2020-07-20 NOTE — TELEPHONE ENCOUNTER
PCP: Rima Sweet MD    Last appt: 4/29/2020  Future Appointments   Date Time Provider Alok Rayo   8/13/2020  1:15 PM ELENITA Mims   10/14/2020  9:00 AM Rahat Pop MD [facility] SCHED       Requested Prescriptions     Pending Prescriptions Disp Refills    phentermine (ADIPEX-P) 37.5 mg tablet 30 Tab 0     Sig: Take 1 Tab by mouth every morning.  Max Daily Amount: 37.5 mg.

## 2020-07-22 RX ORDER — PHENTERMINE HYDROCHLORIDE 37.5 MG/1
37.5 TABLET ORAL
Qty: 30 TAB | Refills: 0 | Status: SHIPPED | OUTPATIENT
Start: 2020-07-22 | End: 2020-09-22 | Stop reason: SDUPTHER

## 2020-08-13 ENCOUNTER — OFFICE VISIT (OUTPATIENT)
Dept: HEMATOLOGY | Age: 50
End: 2020-08-13
Payer: COMMERCIAL

## 2020-08-13 VITALS
SYSTOLIC BLOOD PRESSURE: 109 MMHG | TEMPERATURE: 97.2 F | RESPIRATION RATE: 16 BRPM | OXYGEN SATURATION: 98 % | WEIGHT: 198.2 LBS | HEIGHT: 69 IN | BODY MASS INDEX: 29.36 KG/M2 | DIASTOLIC BLOOD PRESSURE: 77 MMHG | HEART RATE: 69 BPM

## 2020-08-13 DIAGNOSIS — K76.0 FATTY LIVER: Primary | ICD-10-CM

## 2020-08-13 PROCEDURE — 91200 LIVER ELASTOGRAPHY: CPT | Performed by: NURSE PRACTITIONER

## 2020-08-13 PROCEDURE — 99214 OFFICE O/P EST MOD 30 MIN: CPT | Performed by: NURSE PRACTITIONER

## 2020-08-13 NOTE — PROGRESS NOTES
[address] [facility], MD, MD Paola Sultana PA-C Romilda Mines, [facility]     April YUAN AlasFranky, [facility]   BELEM Avalos [facility]       [address]    at [address], [address] [place] [address]  [address].    [phone]    FAX: [address]    at [facility]    [address], [address]    [place], [address]    [phone]    FAX: [phone]     Patient Care Team:  Natalie Cronin MD as PCP - Kimber Fatima MD as PCP - [facility]    Patient Active Problem List   Diagnosis Code    AR (allergic rhinitis) J30.9    ED (erectile dysfunction) N52.9    ADD (attention deficit disorder) F98.8    Primary osteoarthritis of right knee M17.11    Allergic reaction T78.40XA    Vitamin D deficiency E55.9    Obesity, Class I, BMI 30-34.9 E66.9    Family history of premature CAD Z82.49    Psoriasis L40.9    Elevated LFTs R79.89    Fatty liver K76.0     Amandeep Gusman is being seen at [address] for management of fatty liver. The active problem list, all pertinent past medical history, medications, liver histology and laboratory findings related to the liver disorder were reviewed with the patient. The patient is a 45 y.o.  male who was found to have liver disease in 12/2019 via ultrasound. FibroScan was performed 12/2019 and showed fatty liver and no fibrosis. This will be repeated today. The patient has no symptoms which can be attributed to the liver disorder. The patient has not experienced the following symptoms: pain in the right side over the liver or yellowing of the eyes or skin.      The patient completes all daily activities without any functional limitations. He has lost weight, decreased alcohol use and substantially increased his exercise. ASSESSMENT AND PLAN:  Fatty liver disease with no fibrosis. Suspect the patient has fatty liver based upon imaging, FibroScan CAP score, serologic studies that are negative for other causes of chronic liver disease,     Serologic testing for causes of chronic liver disease was negative for HCV, HBV. Elastography performed today and in 12/2019 suggests no fibrosis.     FibroScan liver stiffness is normal suggesting that he has NAFL not SMITH. NAFL is a benign form of fatty liver disease and not thought to progress to fibrosis or cirrhosis.       Counseling for diet and weight loss in patients with confirmed or suspected NAFLD  The patient has changed his diet and now does daily exercise. He has lost 10% of initial body weight from 215 to 194 pounds. He is on Mediterranean diet with probiotics and olive oil supplements.       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 Elastography performed in 12/2019 suggests fatty liver consistent with alcohol etiology and no fibrosis     The patient is now consuming only 4 alcoholic drinks per week. This amount of alcohol is perfectly fine.     Will perform laboratory testing to monitor liver function and degree of liver injury. This included BMP, hepatic panel and CBC with platelet count. The FibroScan can be repeated annually or as often as clinically indicated to assess for fibrosis progression and/or regression.     As he has repeated no fibrosis on FibroScan, if his liver enzymes normalized, no further follow up is needed.  If they are elevated, I would also send more serologies.     Elevation in ferritin  There is an elevation in ferritin   The patient is unlikely to have hemochromatosis but may be a carrier for an HFE gene. Most likely the elevation in ferritin is due to fatty liver and previous alcohol use. Now that he has lost weight much of hepatic steatosis has probably resolved and with this ferritin will decline. It will also go down with reduced alcohol intake. Will repeat ferritin and FE saturation.     Treatment of other medical problems in patients with chronic liver disease  There are no contraindications for the patient to take most medications that are necessary for treatment of other medical issues.     Vaccinations   The need for vaccination against viral hepatitis A and B will be assessed with serologic and instituted as appropriate. Routine vaccinations against other bacterial and viral agents can be performed as indicated. Annual flu vaccination should be administered if indicated. No Known Allergies    Current Outpatient Medications on File Prior to Visit   Medication Sig Dispense Refill    phentermine (ADIPEX-P) 37.5 mg tablet Take 1 Tab by mouth every morning. Max Daily Amount: 37.5 mg. 30 Tab 0    ALPRAZolam (XANAX) 0.25 mg tablet Take 1 Tab by mouth nightly as needed for Anxiety or Sleep. Max Daily Amount: 0.25 mg. 20 Tab 0    sodium hyaluronate, viscosup, (ORTHOVISC) 30 mg/2 mL syrg injection 30 mg by Intra artICUlar route every 6 months.  B.infantis-B.ani-B.long-B.bifi (PROBIOTIC 4X) 10-15 mg TbEC Take  by mouth.  multivits,Stress Formula-Zinc tablet Take 1 Tab by mouth daily.  tadalafil (CIALIS) 10 mg tablet Take 1 Tab by mouth daily as needed. 8 Tab 5    cholecalciferol, vitamin D3, (VITAMIN D3) 2,000 unit tab Take 2,000 Units by mouth.  omega 3-DHA-EPA-fish oil 1,000 mg (120 mg-180 mg) capsule Take 1 Cap by mouth daily.  magnesium 250 mg tab Take  by mouth daily. No current facility-administered medications on file prior to visit.       SYSTEM REVIEW NOT RELATED TO LIVER DISEASE OR REVIEWED ABOVE:  Constitution systems: Negative for fever, chills, weight gain, weight loss. Eyes: Negative for visual changes. ENT: Negative for sore throat, painful swallowing. Respiratory: Negative for cough, hemoptysis, SOB. Cardiology: Negative for chest pain, palpitations. GI:  Negative for constipation or diarrhea. : Negative for urinary frequency, dysuria, hematuria, nocturia. Skin: Negative for rash. Hematology: Negative for easy bruising, blood clots. Musculo-skeletal: Negative for back pain, muscle pain, weakness. Neurologic: Negative for headaches, dizziness, vertigo, memory problems not related to HE. Psychology: Negative for anxiety, depression. PHYSICAL EXAMINATION:  Visit Vitals  /77 (BP 1 Location: Right arm, BP Patient Position: Sitting)   Pulse 69   Temp 97.2 °F (36.2 °C) (Oral)   Resp 16   Ht 5' 9\" (1.753 m)   Wt 198 lb 3.2 oz (89.9 kg)   SpO2 98%   BMI 29.27 kg/m²     General: No acute distress. Skin: No spider angiomata. No jaundice. No palmar erythema. Abdomen: Soft non-tender. Liver size normal to percussion/palpation. Spleen not palpable. No obvious ascites. Extremities: No edema. No muscle wasting. No gross arthritic changes. Neurologic: Alert and oriented. Cranial nerves grossly intact. No asterixis.     LABORATORY STUDIES:  Liver [place] [address] Units 12/12/2019 5/20/2019   WBC 3.4 - 10.8 x10E3/uL 4.1    ANC 1.4 - 7.0 x10E3/uL 2.1    HGB 13.0 - 17.7 g/dL 17.4     - 450 x10E3/uL 212    AST 0 - 40 IU/L 53 (H) 23   ALT 0 - 44 IU/L 93 (H) 30   Alk Phos 39 - 117 IU/L 62 63   Bili, Total 0.0 - 1.2 mg/dL 0.8 0.6   Bili, Direct 0.00 - 0.40 mg/dL 0.20    Albumin 3.5 - 5.5 g/dL 4.9 4.6   BUN 6 - 24 mg/dL 21 28 (H)   Creat 0.76 - 1.27 mg/dL 1.20 1.15   Na 134 - 144 mmol/L 140 143   K 3.5 - 5.2 mmol/L 4.4 4.6   Cl 96 - 106 mmol/L 101 104   CO2 20 - 29 mmol/L 23 24   Glucose 65 - 99 mg/dL 120 (H) 106 (H)     Liver [place] [address] Ref Rng & Units 5/23/2018   WBC 3.4 - 10.8 x10E3/uL 4.3   ANC 1.4 - 7.0 x10E3/uL 2.3   HGB 13.0 - 17.7 g/dL 16.2    - 450 x10E3/uL 178   AST 0 - 40 IU/L 25   ALT 0 - 44 IU/L 38   Alk Phos 39 - 117 IU/L 61   Bili, Total 0.0 - 1.2 mg/dL 0.8   Bili, Direct 0.00 - 0.40 mg/dL    Albumin 3.5 - 5.5 g/dL 4.7   BUN 6 - 24 mg/dL 24   Creat 0.76 - 1.27 mg/dL 0.96   Na 134 - 144 mmol/L 143   K 3.5 - 5.2 mmol/L 4.1   Cl 96 - 106 mmol/L 103   CO2 20 - 29 mmol/L 21   Glucose 65 - 99 mg/dL 108 (H)     SEROLOGIES:  Serologies Latest Ref Rng & Units 12/12/2019   Hep B Surface Ag Negative Negative   Ferritin 30 - 400 ng/mL 799 (H)     LIVER HISTOLOGY:  8/2020. FibroScan performed at [facility]. EkPa was 4.6. IQR/med 13%. . The results suggested a fibrosis level of F0. The CAP score suggests fatty liver. 12/2019. FibroScan performed at [facility]. EkPa was 4.6. IQR/med 2%. . The results suggested a fibrosis level of F0. The CAP score suggests fatty liver. ENDOSCOPIC PROCEDURES:  Not available or performed    RADIOLOGY:  12/2019. Abdominal ultrasound. Increased echogenicity of liver. Liver span noted as 19 mm but that is likely a typo. If 19 cm, enlarged but no signs of cirrhosis. OTHER TESTING:  Not available or performed    FOLLOW-UP:  All of the issues listed above in the assessment and plan were discussed with the patient. All questions were answered. The patient expressed a clear understanding of the above. Follow up in the [facility] as needed. If liver enzymes are normal, PRN. If liver enzymes are elevated, I will want to see him to rule out other etiologies.      George Ferreira, [facility]-BC  Liver [place] [facility] [address] [facility], [address] [place] [address]  [address].  [phone]

## 2020-08-13 NOTE — PROGRESS NOTES
Chief Complaint   Patient presents with    Fatty Liver     3 most recent PHQ Screens 8/13/2020   Little interest or pleasure in doing things Not at all   Feeling down, depressed, irritable, or hopeless Not at all   Total Score PHQ 2 0     Abuse Screening Questionnaire 8/13/2020   Do you ever feel afraid of your partner? N   Are you in a relationship with someone who physically or mentally threatens you? N   Is it safe for you to go home? Y     Visit Vitals  /77 (BP 1 Location: Right arm, BP Patient Position: Sitting)   Pulse 69   Temp 97.2 °F (36.2 °C) (Oral)   Resp 16   Ht 5' 9\" (1.753 m)   Wt 198 lb 3.2 oz (89.9 kg)   SpO2 98%   BMI 29.27 kg/m²     1. Have you been to the ER, urgent care clinic since your last visit? Hospitalized since your last visit?no    2. Have you seen or consulted any other health care providers outside of the [address] [place] [address] since your last visit? Include any pap smears or colon screening.  no

## 2020-08-14 ENCOUNTER — HOSPITAL ENCOUNTER (OUTPATIENT)
Dept: ULTRASOUND IMAGING | Age: 50
Discharge: HOME | End: 2020-08-14
Attending: INTERNAL MEDICINE
Payer: COMMERCIAL

## 2020-08-14 DIAGNOSIS — K76.0 FATTY LIVER: ICD-10-CM

## 2020-08-14 PROCEDURE — 76705 ECHO EXAM OF ABDOMEN: CPT

## 2020-09-22 DIAGNOSIS — E66.9 OBESITY, CLASS I, BMI 30-34.9: ICD-10-CM

## 2020-09-22 RX ORDER — PHENTERMINE HYDROCHLORIDE 37.5 MG/1
37.5 TABLET ORAL
Qty: 30 TAB | Refills: 0 | Status: CANCELLED | OUTPATIENT
Start: 2020-09-22

## 2020-09-22 RX ORDER — PHENTERMINE HYDROCHLORIDE 37.5 MG/1
37.5 TABLET ORAL
Qty: 30 TAB | Refills: 0 | Status: SHIPPED | OUTPATIENT
Start: 2020-09-22 | End: 2020-11-03 | Stop reason: SDUPTHER

## 2020-09-22 NOTE — TELEPHONE ENCOUNTER
MD Pop,    Patient is calling and irate that no one has called him to refill his phentermine prescription and he has been requesting since 9/11/20 with no response from the office. He is ready to leave this office if no one will respond. His last office visit was virtual on 4/29/20 with no mention of return date. Please advise and call him back with information today. [phone]    Thanks, Silva    Previous Refill Encounter(s): 7/22/20  #30    Requested Prescriptions     Pending Prescriptions Disp Refills    phentermine (ADIPEX-P) 37.5 mg tablet 30 Tab 0     Sig: Take 1 Tab by mouth every morning.  Max Daily Amount: 37.5 mg.

## 2020-11-03 DIAGNOSIS — E66.9 OBESITY, CLASS I, BMI 30-34.9: ICD-10-CM

## 2020-11-05 RX ORDER — PHENTERMINE HYDROCHLORIDE 37.5 MG/1
37.5 TABLET ORAL
Qty: 30 TAB | Refills: 0 | Status: SHIPPED | OUTPATIENT
Start: 2020-11-05 | End: 2021-01-22 | Stop reason: SDUPTHER

## 2020-11-17 DIAGNOSIS — F41.9 ANXIETY: ICD-10-CM

## 2020-11-20 NOTE — TELEPHONE ENCOUNTER
Please set him up for a virtual after hours next week after 5:30 pm, it a controled med and a dosage incresae request

## 2020-11-22 ENCOUNTER — PATIENT MESSAGE (OUTPATIENT)
Dept: FAMILY MEDICINE CLINIC | Age: 50
End: 2020-11-22

## 2020-11-24 RX ORDER — ALPRAZOLAM 0.25 MG/1
0.25 TABLET ORAL
Qty: 20 TAB | Refills: 0 | OUTPATIENT
Start: 2020-11-24

## 2020-11-24 NOTE — TELEPHONE ENCOUNTER
From: Shabnam Caldera  To: Yolie Turner MD  Sent: 11/22/2020 6:43 PM EST  Subject: Prescription Question    Patient Comment: Hi Dr Baldomero Rg can you please renew Xanax as you can see its been since April since I have used it and I only had 20 pills I have lots of stress with work and Covid etc and could use them for certain times I would prefer . 5 if you could and a 30 count thx

## 2020-11-29 ENCOUNTER — APPOINTMENT (OUTPATIENT)
Dept: GENERAL RADIOLOGY | Age: 50
End: 2020-11-29
Attending: EMERGENCY MEDICINE
Payer: COMMERCIAL

## 2020-11-29 ENCOUNTER — HOSPITAL ENCOUNTER (EMERGENCY)
Age: 50
Discharge: HOME | End: 2020-11-29
Attending: EMERGENCY MEDICINE
Payer: COMMERCIAL

## 2020-11-29 VITALS
DIASTOLIC BLOOD PRESSURE: 91 MMHG | BODY MASS INDEX: 31.25 KG/M2 | HEIGHT: 69 IN | OXYGEN SATURATION: 97 % | WEIGHT: 210.98 LBS | SYSTOLIC BLOOD PRESSURE: 141 MMHG | TEMPERATURE: 98.1 F | HEART RATE: 91 BPM | RESPIRATION RATE: 14 BRPM

## 2020-11-29 DIAGNOSIS — J06.9 VIRAL URI WITH COUGH: ICD-10-CM

## 2020-11-29 DIAGNOSIS — Z20.822 ENCOUNTER FOR SCREENING LABORATORY TESTING FOR COVID-19 VIRUS: Primary | ICD-10-CM

## 2020-11-29 PROCEDURE — 74011250637 HC RX REV CODE- 250/637: Performed by: EMERGENCY MEDICINE

## 2020-11-29 PROCEDURE — 87635 SARS-COV-2 COVID-19 AMP PRB: CPT

## 2020-11-29 PROCEDURE — 99283 EMERGENCY DEPT VISIT LOW MDM: CPT

## 2020-11-29 PROCEDURE — 71045 X-RAY EXAM CHEST 1 VIEW: CPT

## 2020-11-29 RX ORDER — ACETAMINOPHEN 500 MG
1000 TABLET ORAL
Qty: 20 TAB | Refills: 0 | Status: SHIPPED | OUTPATIENT
Start: 2020-11-29 | End: 2021-09-21 | Stop reason: ALTCHOICE

## 2020-11-29 RX ORDER — ACETAMINOPHEN 500 MG
1000 TABLET ORAL
Status: COMPLETED | OUTPATIENT
Start: 2020-11-29 | End: 2020-11-29

## 2020-11-29 RX ORDER — IBUPROFEN 800 MG/1
800 TABLET ORAL
Qty: 20 TAB | Refills: 0 | Status: SHIPPED | OUTPATIENT
Start: 2020-11-29 | End: 2020-12-06

## 2020-11-29 RX ORDER — ZOLPIDEM TARTRATE 5 MG/1
5 TABLET ORAL
COMMUNITY
End: 2022-02-21 | Stop reason: SDUPTHER

## 2020-11-29 RX ADMIN — ACETAMINOPHEN 1000 MG: 500 TABLET ORAL at 17:11

## 2020-11-30 ENCOUNTER — VIRTUAL VISIT (OUTPATIENT)
Dept: FAMILY MEDICINE CLINIC | Age: 50
End: 2020-11-30
Payer: COMMERCIAL

## 2020-11-30 ENCOUNTER — PATIENT OUTREACH (OUTPATIENT)
Dept: CASE MANAGEMENT | Age: 50
End: 2020-11-30

## 2020-11-30 DIAGNOSIS — F41.9 ANXIETY: ICD-10-CM

## 2020-11-30 DIAGNOSIS — J06.9 VIRAL UPPER RESPIRATORY TRACT INFECTION: Primary | ICD-10-CM

## 2020-11-30 PROCEDURE — 99213 OFFICE O/P EST LOW 20 MIN: CPT | Performed by: FAMILY MEDICINE

## 2020-11-30 RX ORDER — ALPRAZOLAM 0.5 MG/1
0.5 TABLET ORAL
Qty: 30 TAB | Refills: 0 | Status: SHIPPED | OUTPATIENT
Start: 2020-11-30 | End: 2021-05-11 | Stop reason: DRUGHIGH

## 2020-11-30 NOTE — ED PROVIDER NOTES
The history is provided by the patient. Cough   This is a new problem. Episode onset: 3 days ago. The problem occurs constantly. The problem has not changed since onset. The cough is productive of sputum. There has been a fever of 100 - 100.9 F. Fever duration: noted on arrival. Pertinent negatives include no chest pain. He has tried nothing for the symptoms. He is not a smoker. His past medical history does not include pneumonia. Past Medical History:   Diagnosis Date    ADD (attention deficit disorder)     Alcohol use     ED (erectile dysfunction)     Liver disease     FATTY liver    Preglaucoma of both eyes        Past Surgical History:   Procedure Laterality Date    HX HEENT      wisdom    HX ORTHOPAEDIC      knee left         Family History:   Problem Relation Age of Onset    Glaucoma Father     Hypertension Father     Stroke Sister        Social History     Socioeconomic History    Marital status:      Spouse name: Not on file    Number of children: Not on file    Years of education: Not on file    Highest education level: Not on file   Occupational History    Not on file   Social Needs    Financial resource strain: Not on file    Food insecurity     Worry: Not on file     Inability: Not on file    Transportation needs     Medical: Not on file     Non-medical: Not on file   Tobacco Use    Smoking status: Never Smoker    Smokeless tobacco: Never Used    Tobacco comment: occasional    Substance and Sexual Activity    Alcohol use:  Yes     Alcohol/week: 43.0 standard drinks     Types: 15 Standard drinks or equivalent, 28 Shots of liquor per week     Frequency: 4 or more times a week     Drinks per session: 3 or 4     Comment: 3-4 drinks per day    Drug use: Yes     Types: Marijuana    Sexual activity: Yes     Partners: Female   Lifestyle    Physical activity     Days per week: Not on file     Minutes per session: Not on file    Stress: Not on file   Relationships    Social connections     Talks on phone: Not on file     Gets together: Not on file     Attends [employer] service: Not on file     Active member of club or organization: Not on file     Attends meetings of clubs or organizations: Not on file     Relationship status: Not on file    Intimate partner violence     Fear of current or ex partner: Not on file     Emotionally abused: Not on file     Physically abused: Not on file     Forced sexual activity: Not on file   Other Topics Concern    Not on file   Social History Narrative    Not on file         ALLERGIES: Patient has no known allergies. Review of Systems   Respiratory: Positive for cough. Cardiovascular: Negative for chest pain. All other systems reviewed and are negative. Vitals:    11/29/20 1620 11/29/20 1748   BP: 128/83 (!) 141/91   Pulse: 89 91   Resp: 14 14   Temp: 100.4 °F (38 °C) 98.1 °F (36.7 °C)   SpO2: 98% 97%   Weight: 95.7 kg (210 lb 15.7 oz)    Height: 5' 9\" (1.753 m)             Physical Exam  Vitals signs and nursing note reviewed. Constitutional:       General: He is not in acute distress. Appearance: He is well-developed. HENT:      Head: Normocephalic and atraumatic. Eyes:      Conjunctiva/sclera: Conjunctivae normal.   Neck:      Musculoskeletal: Neck supple. Trachea: No tracheal deviation. Cardiovascular:      Rate and Rhythm: Normal rate and regular rhythm. Pulmonary:      Effort: Pulmonary effort is normal. No respiratory distress. Abdominal:      General: There is no distension. Musculoskeletal: Normal range of motion. General: No deformity. Skin:     General: Skin is warm and dry. Neurological:      Mental Status: He is alert. Cranial Nerves: No cranial nerve deficit. Psychiatric:         Behavior: Behavior normal.          MDM     45 y.o. male presents with cough for last 3 days which is newly productive. No hypoxemia or other high risk features.  Recommended COVID testing d/t multiple clinical features. Provided instructions for supportive care measures. No signs of airspace disease on CXR. Plan to follow up with PCP as needed and return precautions discussed for worsening or new concerning symptoms.      Procedures

## 2020-11-30 NOTE — PROGRESS NOTES
Willistine Hammans is a 45 y.o. male who was seen by synchronous (real-time) audio-video technology on 11/30/2020. Consent:  Services were provided through a video synchronous discussion virtually to substitute for in-person appointment. He and/or his healthcare decision maker is aware that this patient-initiated Telehealth encounter is a billable service, with coverage as determined by his insurance carrier. He is aware that he may receive a bill and has provided verbal consent to proceed: Yes    I was in the office while conducting this encounter. Subjective:   Willistine Hammans was seen for No chief complaint on file. Pt informs me he was diagnosed with COVID-19 yesterday via a PCR test. He denies SOB, coughing, dizziness, and fever. Pt states he feels an overall sense of fatigue. Additionally, he has lost his sense of taste. Pt informs me that his daughter has also been diagnosed with COVID-19 that caused her to break out into a rash on her neck. Pt has been experiencing increased anxiety recently due to pay cuts, lay offs, and other work related issues. He requests a refill of his prescription for Xanax 0.5 mg/PRN. He states that he made his last prescription of 20 pills last over 6 months. I advised pt to wait until he recovers from the virus as Xanax can reduce respiratory drive. I requested that pt keep me updated on his health and well-being as he recovers from the novel COVID-19 virus. Prior to Admission medications    Medication Sig Start Date End Date Taking? Authorizing Provider   ALPRAZonataliya Pettit) 0.5 mg tablet Take 1 Tab by mouth daily as needed for Anxiety. 11/30/20  Yes Blas Olguin MD   zolpidem (Ambien) 5 mg tablet Take 5 mg by mouth nightly as needed for Sleep. Yes Ruth, MD Kristy   ibuprofen (MOTRIN) 800 mg tablet Take 1 Tab by mouth every six (6) hours as needed for Pain (fever) for up to 7 days.  11/29/20 12/6/20 Yes Markus Bui MD   acetaminophen (TYLENOL) 500 mg tablet Take 2 Tabs by mouth every six (6) hours as needed for Pain or Fever. 11/29/20  Yes Aminata Garcia MD   guaiFENesin-dextromethorphan SR (Mucinex DM) 600-30 mg per tablet Take 1 Tab by mouth two (2) times a day for 10 days. 11/29/20 12/9/20 Yes Aminata Garcia MD   phentermine (ADIPEX-P) 37.5 mg tablet Take 1 Tab by mouth every morning. Max Daily Amount: 37.5 mg. 11/5/20  Yes Yady Hernandez MD   cholecalciferol, vitamin D3, (VITAMIN D3) 2,000 unit tab Take 2,000 Units by mouth. Yes Provider, Historical   magnesium 250 mg tab Take  by mouth daily. Yes Provider, Historical     No Known Allergies  Past Medical History:   Diagnosis Date    ADD (attention deficit disorder)     Alcohol use     ED (erectile dysfunction)     Liver disease     FATTY liver    Preglaucoma of both eyes      Past Surgical History:   Procedure Laterality Date    HX HEENT      wisdom    HX ORTHOPAEDIC      knee left     Family History   Problem Relation Age of Onset    Glaucoma Father     Hypertension Father     Stroke Sister      Social History     Tobacco Use    Smoking status: Never Smoker    Smokeless tobacco: Never Used    Tobacco comment: occasional    Substance Use Topics    Alcohol use: Yes     Alcohol/week: 43.0 standard drinks     Types: 15 Standard drinks or equivalent, 28 Shots of liquor per week     Frequency: 4 or more times a week     Drinks per session: 3 or 4     Comment: 3-4 drinks per day        Review of Systems   Constitutional: Negative for chills and fever. HENT: Negative for hearing loss and tinnitus. Eyes: Negative for blurred vision and double vision. Respiratory: Negative for cough and shortness of breath. Cardiovascular: Negative for chest pain and palpitations. Gastrointestinal: Negative for nausea and vomiting. Genitourinary: Negative for dysuria and frequency. Musculoskeletal: Negative for back pain and falls. Skin: Negative for itching and rash.    Neurological: Negative for dizziness, loss of consciousness and headaches. Endo/Heme/Allergies: Negative. Psychiatric/Behavioral: Negative for depression. The patient is not nervous/anxious. PHYSICAL EXAMINATION:  Vital Signs: (As obtained by patient/caregiver at home)  There were no vitals taken for this visit. Constitutional: [x] Appears well-developed and well-nourished [x] No apparent distress      [] Abnormal -     Mental status: [x] Alert and awake  [x] Oriented to person/place/time [x] Able to follow commands    [] Abnormal -     Eyes:   EOM    [x]  Normal    [] Abnormal -   Sclera  [x]  Normal    [] Abnormal -          Discharge [x]  None visible   [] Abnormal -     HENT: [x] Normocephalic, atraumatic  [] Abnormal -   [x] Mouth/Throat: Mucous membranes are moist    External Ears [x] Normal  [] Abnormal -    Neck: [x] No visualized mass [] Abnormal -     Pulmonary/Chest: [x] Respiratory effort normal   [x] No visualized signs of difficulty breathing or respiratory distress        [] Abnormal -      Musculoskeletal:   [x] Normal gait with no signs of ataxia         [x] Normal range of motion of neck        [] Abnormal -     Neurological:        [x] No Facial Asymmetry (Cranial nerve 7 motor function) (limited exam due to video visit)          [x] No gaze palsy        [] Abnormal -          Skin:        [x] No significant exanthematous lesions or discoloration noted on facial skin         [] Abnormal -            Psychiatric:       [x] Normal Affect [] Abnormal -        [x] No Hallucinations    Other pertinent observable physical exam findings:-    Assessment & Plan:   Diagnoses and all orders for this visit:    1. Viral upper respiratory tract infection  Pt informs me he was diagnosed with COVID-19 yesterday via a PCR test. He denies SOB, coughing, dizziness, and fever. Pt states he feels an overall sense of fatigue. Additionally, he has lost his sense of taste.  Pt informs me that his daughter has also been diagnosed with COVID-19 that caused her to break out into a rash on her neck. 2. Anxiety  Pt has been experiencing increased anxiety recently due to pay cuts, lay offs, and other work related issues. He requests a refill of his prescription for Xanax 0.5 mg/PRN. He states that he made his last prescription of 20 pills last over 6 months. I advised pt to wait until he recovers from the virus as Xanax can reduce respiratory drive. -     ALPRAZolam (XANAX) 0.5 mg tablet; Take 1 Tab by mouth daily as needed for Anxiety. We discussed the expected course, resolution and complications of the diagnosis(es) in detail. Medication risks, benefits, costs, interactions, and alternatives were discussed as indicated. I advised her to contact the office if her condition worsens, changes or fails to improve as anticipated. She expressed understanding with the diagnosis(es) and plan. Pursuant to the emergency declaration under the [employer] and [facility], 1135 waiver authority and the [employer] and [employer] General Act, this Virtual Visit was conducted, with patient's consent, to reduce the patient's risk of exposure to COVID-19 and provide continuity of care for an established patient. Services were provided through a video synchronous discussion virtually to substitute for in-person clinic visit.      Written by audrey Aivla, as dictated by Ritchie Fenton M.D.

## 2020-11-30 NOTE — PROGRESS NOTES
Patient contacted regarding [id number] Concern for COVID19. Discussed COVID-19 related testing which was pending at this time. Test results were pending. Patient informed of results, if available? yes. Outreach made within 2 business days of discharge: Yes    Care Transition Nurse/ Ambulatory Care Manager/ LPN Care Coordinator contacted the patient by telephone to perform post discharge assessment. Verified name and  with patient as identifiers. Provided introduction to self, and explanation of the CTN/ACM/LPN role, and reason for call due to risk factors for infection and/or exposure to COVID-19. Symptoms reviewed with patient who verbalized the following symptoms: sore throat. Due to new onset of symptoms encounter was routed to provider for escalation. Discussed follow-up appointments. If no appointment was previously scheduled, appointment scheduling offered: pt had a scheduled follow up [facility] follow up appointment(s):   Future Appointments   Date Time Provider Alok Rayo   2020  5:30 PM Mika Adkins MD PAFP [facility]     Non-BS follow up appointment(s): NA      Advance Care Planning:   Does patient have an Advance Directive: will discuss on next call    Patient has following risk factors of: no known risk factors and ED visit to SPT on 2020. CTN/ACM/LPN reviewed discharge instructions, medical action plan and red flags such as increased shortness of breath, increasing fever and signs of decompensation with patient who verbalized understanding. Discussed exposure protocols and quarantine with CDC Guidelines What to do if you are sick with coronavirus disease .  Patient was given an opportunity for questions and concerns. The patient agrees to contact the [facility] exposure line [phone], [facility] and PCP office for questions related to their healthcare. CTN/ACM provided contact information for future needs.     Reviewed and educated patient on any new and changed medications related to discharge diagnosis. Patient/family/caregiver given information for [employer] and agrees to enroll no      Plan for follow-up call in 1-2 days based on severity of symptoms and risk factors. Pt reports new symptom of sore throat. Pt is agreeable to receiving COVID19 information via [employer] and states understanding that he can see COVID19 test results though mychart. Pt advised to self-quarantine for 14 days and to wear a mask around others in the home. Pt confirmed follow up VV visit with Dr. Mitch Jackson today at [phone]. Pt is agreeable to chart being sent to PCP for review. Will continue to follow.

## 2020-12-01 ENCOUNTER — NURSE TRIAGE (OUTPATIENT)
Dept: OTHER | Facility: CLINIC | Age: 50
End: 2020-12-01

## 2020-12-01 ENCOUNTER — HOSPITAL ENCOUNTER (EMERGENCY)
Age: 50
Discharge: HOME | End: 2020-12-01
Attending: EMERGENCY MEDICINE
Payer: COMMERCIAL

## 2020-12-01 ENCOUNTER — VIRTUAL VISIT (OUTPATIENT)
Dept: FAMILY MEDICINE CLINIC | Age: 50
End: 2020-12-01
Payer: COMMERCIAL

## 2020-12-01 VITALS
BODY MASS INDEX: 31.41 KG/M2 | HEIGHT: 69 IN | OXYGEN SATURATION: 99 % | WEIGHT: 212.08 LBS | HEART RATE: 63 BPM | DIASTOLIC BLOOD PRESSURE: 84 MMHG | SYSTOLIC BLOOD PRESSURE: 136 MMHG | RESPIRATION RATE: 16 BRPM | TEMPERATURE: 97.9 F

## 2020-12-01 DIAGNOSIS — J02.9 SORE THROAT: Primary | ICD-10-CM

## 2020-12-01 DIAGNOSIS — J06.9 UPPER RESPIRATORY TRACT INFECTION, UNSPECIFIED TYPE: Primary | ICD-10-CM

## 2020-12-01 DIAGNOSIS — Z20.822 ENCOUNTER FOR LABORATORY TESTING FOR COVID-19 VIRUS: ICD-10-CM

## 2020-12-01 LAB
COVID-19 RAPID TEST, COVR: DETECTED
DEPRECATED S PYO AG THROAT QL EIA: NEGATIVE
SOURCE, COVRS: ABNORMAL
SPECIMEN SOURCE, FCOV2M: ABNORMAL
SPECIMEN TYPE, XMCV1T: ABNORMAL

## 2020-12-01 PROCEDURE — 99213 OFFICE O/P EST LOW 20 MIN: CPT | Performed by: FAMILY MEDICINE

## 2020-12-01 PROCEDURE — 87070 CULTURE OTHR SPECIMN AEROBIC: CPT

## 2020-12-01 PROCEDURE — 87880 STREP A ASSAY W/OPTIC: CPT

## 2020-12-01 PROCEDURE — 87635 SARS-COV-2 COVID-19 AMP PRB: CPT

## 2020-12-01 PROCEDURE — 99282 EMERGENCY DEPT VISIT SF MDM: CPT

## 2020-12-01 RX ORDER — AMOXICILLIN 500 MG/1
500 CAPSULE ORAL 2 TIMES DAILY
Qty: 20 CAP | Refills: 0 | Status: SHIPPED | OUTPATIENT
Start: 2020-12-01 | End: 2020-12-11

## 2020-12-01 NOTE — ED PROVIDER NOTES
HPI       49y M here with sore throat, loss of smell, body aches. Seen here previously for same and had COVID test but the sample had problems and was not run. Back today for recollection. He is also asking for a strep test to be done given the sore throat. No drooling. Able to speak normally. Able to take PO. No sick contacts with similar. Past Medical History:   Diagnosis Date    ADD (attention deficit disorder)     Alcohol use     ED (erectile dysfunction)     Liver disease     FATTY liver    Preglaucoma of both eyes        Past Surgical History:   Procedure Laterality Date    HX HEENT      wisdom    HX ORTHOPAEDIC      knee left         Family History:   Problem Relation Age of Onset    Glaucoma Father     Hypertension Father     Stroke Sister        Social History     Socioeconomic History    Marital status:      Spouse name: Not on file    Number of children: Not on file    Years of education: Not on file    Highest education level: Not on file   Occupational History    Not on file   Social Needs    Financial resource strain: Not on file    Food insecurity     Worry: Not on file     Inability: Not on file    Transportation needs     Medical: Not on file     Non-medical: Not on file   Tobacco Use    Smoking status: Never Smoker    Smokeless tobacco: Never Used    Tobacco comment: occasional    Substance and Sexual Activity    Alcohol use:  Yes     Alcohol/week: 43.0 standard drinks     Types: 15 Standard drinks or equivalent, 28 Shots of liquor per week     Frequency: 4 or more times a week     Drinks per session: 3 or 4     Comment: 3-4 drinks per day    Drug use: Yes     Types: Marijuana    Sexual activity: Yes     Partners: Female   Lifestyle    Physical activity     Days per week: Not on file     Minutes per session: Not on file    Stress: Not on file   Relationships    Social connections     Talks on phone: Not on file     Gets together: Not on file     Attends Jainism service: Not on file     Active member of club or organization: Not on file     Attends meetings of clubs or organizations: Not on file     Relationship status: Not on file    Intimate partner violence     Fear of current or ex partner: Not on file     Emotionally abused: Not on file     Physically abused: Not on file     Forced sexual activity: Not on file   Other Topics Concern    Not on file   Social History Narrative    Not on file         ALLERGIES: Patient has no known allergies. Review of Systems   Review of Systems   Constitutional: (-) weight loss. HEENT: (-) stiff neck   Eyes: (-) discharge. Respiratory: (-) labored breathing. Cardiovascular: (-) syncope. Gastrointestinal: (-) blood in stool. Genitourinary: (-) hematuria. Musculoskeletal: (-) myalgias. Neurological: (-) seizure. Skin: (-) petechiae  Lymph/Immunologic: (-) enlarged lymph nodes  All other systems reviewed and are negative. There were no vitals filed for this visit. Physical Exam  Nursing note and vitals reviewed. Constitutional: oriented to person, place, and time. appears well-developed and well-nourished. No distress. Head: Normocephalic and atraumatic. Sclera anicteric  Nose: No rhinorrhea  Mouth/Throat: Oropharynx is clear and moist. Pharynx diffusely erythematous but no tonsil enlargement. Uvula midline. Eyes: Conjunctivae are normal. Pupils are equal, round, and reactive to light. Right eye exhibits no discharge. Left eye exhibits no discharge. Neck: Painless normal range of motion. Neck supple. No LAD. Cardiovascular: Normal rate, regular rhythm, normal heart sounds and intact distal pulses. Exam reveals no gallop and no friction rub. No murmur heard. Pulmonary/Chest:  No respiratory distress. No wheezes. No rales. No rhonchi. No increased work of breathing. No accessory muscle use. Good air exchange throughout. Abdominal: soft, non-tender, no rebound or guarding.  No hepatosplenomegaly. Normal bowel sounds throughout. Back: no tenderness to palpation, no deformities, no CVA tenderness  Extremities/Musculoskeletal: Normal range of motion. no tenderness. No edema. Distal extremities are neurovasc intact. Lymphadenopathy:   No adenopathy. Neurological:  Alert and oriented to person, place, and time. Coordination normal. CN 2-12 intact. Motor and sensory function intact. Skin: Skin is warm and dry. No rash noted. No pallor. MDM 49y M here for covid test. Lab called head of time. Will check rapid and PCR. Will also check strep. Procedures          Jeannie Mcdermott was evaluated in the Emergency Department on 12/1/2020 for the symptoms described in the history of present illness. He/she was evaluated in the context of the global COVID-19 pandemic, which necessitated consideration that the patient might be at risk for infection with the SARS-CoV-2 virus that causes COVID-19. Institutional protocols and algorithms that pertain to the evaluation of patients at risk for COVID-19 are in a state of rapid change based on information released by regulatory bodies including the CDC and federal and state organizations. These policies and algorithms were followed during the patient's care in the ED.     Surrogate Decision Maker (Who do you want to make decisions for you in the event you are not able to?): Extended Emergency Contact Information  Primary Emergency Contact: Nanda Phone: [phone]  Relation: Spouse  Secondary Emergency Contact: Margarita Pina  [place] Phone: [phone]  Relation: Spouse

## 2020-12-01 NOTE — TELEPHONE ENCOUNTER
CALL RECEIVED FROM:   PATIENT STATES: Only has sore throat currently. PROTOCOL RECOMMENDATION: Call PCP when open, care advice given and understood. TRANSFER TO: [phone], Ana María Toro  PLEASE DO NOT RESPOND TO THIS TRIAGE NOTE THROUGH THIS ENCOUNTER. ANY SUBSEQUENT COMMUNICATION SHOULD BE DIRECTLY WITH THE PATIENT. Reason for Disposition   [1] COVID-19 infection suspected by caller or triager AND [2] mild symptoms (cough, fever, or others) AND [6] no complications or SOB    Answer Assessment - Initial Assessment Questions  1. COVID-19 DIAGNOSIS: \"Who made your Coronavirus (COVID-19) diagnosis? \" \"Was it confirmed by a positive lab test?\" If not diagnosed by a HCP, ask \"Are there lots of cases (community spread) where you live? \" (See public health department website, if unsure)      11/29 at [facility] still pending  2. COVID-19 EXPOSURE: \"Was there any known exposure to COVID before the symptoms began? \" CDC Definition of close contact: within 6 feet (2 meters) for a total of 15 minutes or more over a 24-hour period. none  3. ONSET: \"When did the COVID-19 symptoms start?\"       11/27/29, fatigue, for 3 days. Sore throat started on Monday. 4. WORST SYMPTOM: \"What is your worst symptom? \" (e.g., cough, fever, shortness of breath, muscle aches)      Sore throat  5. COUGH: \"Do you have a cough? \" If so, ask: \"How bad is the cough? \"        Coughing up phlegm, taking mucinex. 6. FEVER: \"Do you have a fever? \" If so, ask: \"What is your temperature, how was it measured, and when did it start? \"      no  7. RESPIRATORY STATUS: \"Describe your breathing? \" (e.g., shortness of breath, wheezing, unable to speak)       no  8. BETTER-SAME-WORSE: Lo Starla you getting better, staying the same or getting worse compared to yesterday? \"  If getting worse, ask, \"In what way? \"      Worse on throat,   9. HIGH RISK DISEASE: \"Do you have any chronic medical problems? \" (e.g., asthma, heart or lung disease, weak immune system, obesity, etc.)      fatty liver disease  10. PREGNANCY: \"Is there any chance you are pregnant? \" \"When was your last menstrual period? \"        n/a  11. OTHER SYMPTOMS: \"Do you have any other symptoms? \"  (e.g., chills, fatigue, headache, loss of smell or taste, muscle pain, sore throat; new loss of smell or taste especially support the diagnosis of COVID-19)        Son had a bad case of strep throat recently.  Fatigue,    Protocols used: CORONAVIRUS (COVID-19) DIAGNOSED OR SUSPECTED-ADULT-

## 2020-12-01 NOTE — ED NOTES
6:45 PM  Lab called that rapid COVID test is positive. I just called the patient and let him know the results. Also went over needing to be 10 days since symptom onset, fever free for at least 24h without fever reducing meds, and overall feeling better to be considered no longer infectious. Return precautions discussed.

## 2020-12-01 NOTE — ED NOTES
5:27 PM 
Pt seen yesterday and checked for COVID but there was a problem with the sample, and it was not run. He is back to get the test sent again. I did not see or evaluate the patient.

## 2020-12-01 NOTE — PROGRESS NOTES
Ginger Ellsworth, who was evaluated through a synchronous (real-time) audio-video encounter, and/or his healthcare decision maker, is aware that it is a billable service, with coverage as determined by his insurance carrier. He provided verbal consent to proceed: YES, and patient identification was verified. It was conducted pursuant to the emergency declaration under the [facility], [facility] authority and the [employer] and [employer] General Act. A caregiver was present when appropriate. Ability to conduct physical exam was limited. I was at home. The patient was at home. This virtual visit was conducted via [employer]. Pursuant to the emergency declaration under the [facility], [address] [place] [address] authority and the [employer] and [employer] Act, this Virtual  Visit was conducted to reduce the patient's risk of exposure to COVID-19 and provide continuity of care for an established patient. Services were provided through a video synchronous discussion virtually to substitute for in-person clinic visit. Due to this being a TeleHealth evaluation, many elements of the physical examination are unable to be assessed. Total Time: minutes: 5-10 minutes. Tono Vivar MD    [phone]  Subjective:   Ginger Ellsworth was seen for Sore Throat    Has had URI symptoms, loss of smell, fevers, and fatigue since Sunday. Went to the ER on Sunday and got COVID tested although test results are not in the system. He saw his PCP yesterday for follow up. Yesterday evening, he began having a severe sore throat with pus. His son had strep throat a month ago. Pt is taking ibuprofen and Tylenol. Able to speak and swallow. Is wondering if he might have strep throat. Prior to Admission medications    Medication Sig Start Date End Date Taking?  Authorizing Provider   ALPRAZolam Braeden Lat) 0.5 mg tablet Take 1 Tab by mouth daily as needed for Anxiety. 11/30/20   Jacob Pop MD   zolpidem (Ambien) 5 mg tablet Take 5 mg by mouth nightly as needed for Sleep. Other, MD Kristy   ibuprofen (MOTRIN) 800 mg tablet Take 1 Tab by mouth every six (6) hours as needed for Pain (fever) for up to 7 days. 11/29/20 12/6/20  Markus Bui MD   acetaminophen (TYLENOL) 500 mg tablet Take 2 Tabs by mouth every six (6) hours as needed for Pain or Fever. 11/29/20   Markus Bui MD   guaiFENesin-dextromethorphan SR (Mucinex DM) 600-30 mg per tablet Take 1 Tab by mouth two (2) times a day for 10 days. 11/29/20 12/9/20  Markus Bui MD   phentermine (ADIPEX-P) 37.5 mg tablet Take 1 Tab by mouth every morning. Max Daily Amount: 37.5 mg. 11/5/20   Jacob Pop MD   cholecalciferol, vitamin D3, (VITAMIN D3) 2,000 unit tab Take 2,000 Units by mouth. Provider, Historical   magnesium 250 mg tab Take  by mouth daily. Provider, Historical       No Known Allergies    Review of Systems   Constitutional: Positive for malaise/fatigue. Negative for fever and weight loss. HENT: Positive for sore throat. Negative for congestion and ear pain. Respiratory: Positive for cough. Negative for hemoptysis, shortness of breath and wheezing. Cardiovascular: Negative for chest pain, palpitations, leg swelling and PND. Gastrointestinal: Negative for abdominal pain, constipation, diarrhea, nausea and vomiting. Physical Exam:     There were no vitals taken for this visit.      General: alert, cooperative, no distress   Mental  status: normal mood, behavior, speech, dress, motor activity, and thought processes, able to follow commands   HENT: NCAT   Neck: no visualized mass   Resp: no respiratory distress   Neuro: no gross deficits   Skin: no discoloration or lesions of concern on visible areas   Psychiatric: normal affect, consistent with stated mood, no evidence of hallucinations       Assessment & Plan:   Willistine Hammans is a 45 y.o. male who presents today for:    1. Upper respiratory tract infection, unspecified type  This is patient's 3rd visit for URI symptoms. Reviewed that COVID testing results are not in the system; he will call [place] ER to verify. He does want to confirm whether or not he has COVID so I recommended [facility] for possible COVID/strep testing if the ER did not actually order the test.  Reviewed signs of strep throat; if symptoms worsen, okay to start delayed abx but recommend supportive tx with salt water gargles, zinc, and OTC naproxen/Tylenol in the interim. - amoxicillin (AMOXIL) 500 mg capsule; Take 1 Cap by mouth two (2) times a day for 10 days. Dispense: 20 Cap; Refill: 0    There are no discontinued medications. Treatment risks/benefits/costs/interactions/alternatives discussed with patient. Advised patient to call back or return to office if symptoms worsen/change/persist. If patient cannot reach us or should anything more severe/urgent arise he/she should proceed directly to the nearest emergency department. Discussed expected course/resolution/complications of diagnosis in detail with patient. Patient expressed understanding with the diagnosis and plan. Emily Menjivar M.D.

## 2020-12-01 NOTE — DISCHARGE INSTRUCTIONS
Patient Education        Sore Throat: Care Instructions  Your Care Instructions     Infection by bacteria or a virus causes most sore throats. Cigarette smoke, dry air, air pollution, allergies, and yelling can also cause a sore throat. Sore throats can be painful and annoying. Fortunately, most sore throats go away on their own. If you have a bacterial infection, your doctor may prescribe antibiotics. Follow-up care is a key part of your treatment and safety. Be sure to make and go to all appointments, and call your doctor if you are having problems. It's also a good idea to know your test results and keep a list of the medicines you take. How can you care for yourself at home? · If your doctor prescribed antibiotics, take them as directed. Do not stop taking them just because you feel better. You need to take the full course of antibiotics. · Gargle with warm salt water once an hour to help reduce swelling and relieve discomfort. Use 1 teaspoon of salt mixed in 1 cup of warm water. · Take an over-the-counter pain medicine, such as acetaminophen (Tylenol), ibuprofen (Advil, Motrin), or naproxen (Aleve). Read and follow all instructions on the label. · Be careful when taking over-the-counter cold or flu medicines and Tylenol at the same time. Many of these medicines have acetaminophen, which is Tylenol. Read the labels to make sure that you are not taking more than the recommended dose. Too much acetaminophen (Tylenol) can be harmful. · Drink plenty of fluids. Fluids may help soothe an irritated throat. Hot fluids, such as tea or soup, may help decrease throat pain. · Use over-the-counter throat lozenges to soothe pain. Regular cough drops or hard candy may also help. These should not be given to young children because of the risk of choking. · Do not smoke or allow others to smoke around you. If you need help quitting, talk to your doctor about stop-smoking programs and medicines.  These can increase your chances of quitting for good. · Use a vaporizer or humidifier to add moisture to your bedroom. Follow the directions for cleaning the machine. When should you call for help? Call your doctor now or seek immediate medical care if:    · You have new or worse trouble swallowing.     · Your sore throat gets much worse on one side. Watch closely for changes in your health, and be sure to contact your doctor if you do not get better as expected. Where can you learn more? Go to http://www.gray.com/  Enter U420 in the search box to learn more about \"Sore Throat: Care Instructions. \"  Current as of: April 15, 2020               Content Version: 12.6  © 6784-4620 [employer]. Care instructions adapted under license by [employer] (which disclaims liability or warranty for this information). If you have questions about a medical condition or this instruction, always ask your healthcare professional. [place] [address] any warranty or liability for your use of this information. Patient Education        Learning About Coronavirus [phone])  Coronavirus [phone]): Overview  What is coronavirus ([id number])? The coronavirus disease (COVID-19) is caused by a virus. It is an illness that was first found in December 2019. It has since spread worldwide. The virus can cause fever, cough, and trouble breathing. In severe cases, it can cause pneumonia and make it hard to breathe without help. It can cause death. This virus spreads person-to-person through droplets from coughing and sneezing. It can also spread when you are close to someone who is infected. And it can spread when you touch something that has the virus on it, such as a doorknob or a tabletop. Coronaviruses are a large group of viruses. They cause the common cold. They also cause more serious illnesses like Middle East respiratory syndrome (MERS) and severe acute respiratory syndrome (SARS). COVID-19 is caused by a novel coronavirus. That means it's a new type that has not been seen in people before. How is COVID-19 treated? Mild illness can be treated at home, but more serious illness needs to be treated in the hospital. Treatment may include medicines to reduce symptoms, plus breathing support such as oxygen therapy or a ventilator. Other treatments, such as antiviral medicines, may help people who have COVID-19. What can you do to protect yourself from COVID-19? The best way to protect yourself from getting sick is to:  · Avoid areas where there is an outbreak. · Avoid contact with people who may be infected. · Avoid crowds and try to stay at least 6 feet away from other people. · Wash your hands often, especially after you cough or sneeze. Use soap and water, and scrub for at least 20 seconds. If soap and water aren't available, use an alcohol-based hand . · Avoid touching your mouth, nose, and eyes. What can you do to avoid spreading the virus to others? To help avoid spreading the virus to others:  · Wash your hands often with soap or alcohol-based hand sanitizers. · Cover your mouth with a tissue when you cough or sneeze. Then throw the tissue in the trash. · Use a disinfectant to clean things that you touch often. These include doorknobs, remote controls, phones, and handles on your refrigerator and microwave. And don't forget countertops, tabletops, bathrooms, and computer keyboards. · Wear a cloth face cover if you have to go to public areas. If you know or suspect that you have COVID-19:  · Stay home. Don't go to school, work, or public areas. And don't use public transportation, ride-shares, or taxis unless you have no choice. · Leave your home only if you need to get medical care or testing. But call the doctor's office first so they know you're coming. And wear a face cover. · Limit contact with people in your home.  If possible, stay in a separate bedroom and use a separate bathroom. · Wear a face cover whenever you're around other people. It can help stop the spread of the virus when you cough or sneeze. · Clean and disinfect your home every day. Use household  and disinfectant wipes or sprays. Take special care to clean things that you grab with your hands. · Self-isolate until it's safe to be around others again. ? If you have symptoms, it's safe when you haven't had a fever for 3 days and your symptoms have improved and it's been at least 10 days since your symptoms started. ? If you were exposed to the virus but don't have symptoms, it's safe to be around others 14 days after exposure. ? Talk to your doctor about whether you also need testing, especially if you have a weakened immune system. When to call for help  Call 911 anytime you think you may need emergency care. For example, call if:  · You have severe trouble breathing. (You can't talk at all.)  · You have constant chest pain or pressure. · You are severely dizzy or lightheaded. · You are confused or can't think clearly. · Your face and lips have a blue color. · You passed out (lost consciousness) or are very hard to wake up. Call your doctor now if you develop symptoms such as:  · Shortness of breath. · Fever. · Cough. If you need to get care, call ahead to the doctor's office for instructions before you go. Make sure you wear a face cover to prevent exposing other people to the virus. Where can you get the latest information? The following health organizations are tracking and studying this virus. Their websites contain the most up-to-date information. Bijanharriet Bowman also learn what to do if you think you may have been exposed to the virus. · U.S. Centers for Disease Control and Prevention (CDC): The CDC provides updated news about the disease and travel advice. The website also tells you how to prevent the spread of infection.  www.cdc.gov  · World Health Organization [facility]): WHO offers information about the virus outbreaks. WHO also has travel advice. www.who.int  Current as of: July 10, 2020               Content Version: 12.6  © 2006-2020 [employer]. Care instructions adapted under license by [employer] (which disclaims liability or warranty for this information). If you have questions about a medical condition or this instruction, always ask your healthcare professional. [address] any warranty or liability for your use of this information.

## 2020-12-02 ENCOUNTER — PATIENT OUTREACH (OUTPATIENT)
Dept: CASE MANAGEMENT | Age: 50
End: 2020-12-02

## 2020-12-02 NOTE — PROGRESS NOTES
Called pt to follow up on ED visit to [facility] on 12/1/2020. LVM stating my name, CTN calling on behalf of [facility], date and time of call and my return telephone number. Previous episode of care resolved. Will follow up with pt. Called pt's emergency contact, also on pt's PHI, and LVM stating my name, CTN [facility], date and time of call, and return telephone number.

## 2020-12-03 ENCOUNTER — PATIENT OUTREACH (OUTPATIENT)
Dept: CASE MANAGEMENT | Age: 50
End: 2020-12-03

## 2020-12-03 LAB
BACTERIA SPEC CULT: NORMAL
SERVICE CMNT-IMP: NORMAL

## 2020-12-03 NOTE — PROGRESS NOTES
Patient contacted regarding COVID-19 diagnosis. Discussed COVID-19 related testing which was available at this time. Test results were positive. Patient informed of results, if available? yes. Outreach made within 2 business days of discharge: Yes    Care Transition Nurse/ Ambulatory Care Manager/ LPN Care Coordinator contacted the patient by telephone to perform post discharge assessment. Verified name and  with patient as identifiers. Provided introduction to self, and explanation of the CTN/ACM/LPN role, and reason for call due to risk factors for infection and/or exposure to COVID-19. Symptoms reviewed with patient who verbalized the following symptoms: no new symptoms and no worsening symptoms. Due to no new or worsening symptoms encounter was not routed to provider for escalation. Discussed follow-up appointments. If no appointment was previously scheduled, appointment scheduling offered: pt had a VV with PCP prior to second ED visit  [facility] follow up appointment(s): No future appointments. Non-[facility] follow up appointment(s): NA      Advance Care Planning:   Does patient have an Advance Directive: not discussed    Patient has following risk factors of: no known risk factors and ED visit to [facility] on 2020. CTN/ACM/LPN reviewed discharge instructions, medical action plan and red flags such as increased shortness of breath, increasing fever and signs of decompensation with patient who verbalized understanding. Discussed exposure protocols and quarantine with CDC Guidelines What to do if you are sick with coronavirus disease .  Patient was given an opportunity for questions and concerns. The patient agrees to contact the [facility] exposure line [phone],  [facility] and PCP office for questions related to their healthcare. CTN/ACM provided contact information for future needs.     Reviewed and educated patient on any new and changed medications related to discharge diagnosis. Patient/family/caregiver given information for [employer] and agrees to enroll not offered as this is second call      Plan for follow-up call in 5-7 days based on severity of symptoms and risk factors. Pt reports second visit to [facility] with request for rapid COVID19 test which was COVID19 detected. Pt has information provided on first call that I performed and pt has had VV visit with PCP prior to ED visit. Pt reports that he was feeling well yesterday morning and developed a fever yesterday afternoon that resolved with tylenol and motrin. Pt reports no needs at this time and confirms that his wife and 26 yo child are wearing masks when around pt. Pt reminded to continue self-quarantine. Pt reports that he is on day 6 and will continue to monitor himself. Pt reports that oxygen level did drop into mid 80s but is fine now, in high 90s. Pt is agreeable to a follow up call next week.

## 2020-12-05 ENCOUNTER — PATIENT OUTREACH (OUTPATIENT)
Dept: CASE MANAGEMENT | Age: 50
End: 2020-12-05

## 2020-12-05 NOTE — PROGRESS NOTES
Pt LVM on Friday. Returned pt's call and LVM stating my name, CTN with [address], date and time of call. Pt advised to return to ED for SOB or any time that he feels that he is having a medical emergency.

## 2020-12-07 ENCOUNTER — HOSPITAL ENCOUNTER (EMERGENCY)
Age: 50
Discharge: HOME | End: 2020-12-07
Attending: EMERGENCY MEDICINE
Payer: COMMERCIAL

## 2020-12-07 ENCOUNTER — APPOINTMENT (OUTPATIENT)
Dept: GENERAL RADIOLOGY | Age: 50
End: 2020-12-07
Attending: EMERGENCY MEDICINE
Payer: COMMERCIAL

## 2020-12-07 VITALS
OXYGEN SATURATION: 94 % | WEIGHT: 205.91 LBS | RESPIRATION RATE: 20 BRPM | BODY MASS INDEX: 30.5 KG/M2 | HEART RATE: 88 BPM | SYSTOLIC BLOOD PRESSURE: 117 MMHG | HEIGHT: 69 IN | TEMPERATURE: 100 F | DIASTOLIC BLOOD PRESSURE: 83 MMHG

## 2020-12-07 DIAGNOSIS — U07.1 COVID-19: Primary | ICD-10-CM

## 2020-12-07 LAB
ATRIAL RATE: 85 BPM
CALCULATED P AXIS, ECG09: 54 DEGREES
CALCULATED R AXIS, ECG10: 26 DEGREES
CALCULATED T AXIS, ECG11: 17 DEGREES
DIAGNOSIS, 93000: NORMAL
P-R INTERVAL, ECG05: 156 MS
Q-T INTERVAL, ECG07: 352 MS
QRS DURATION, ECG06: 100 MS
QTC CALCULATION (BEZET), ECG08: 418 MS
VENTRICULAR RATE, ECG03: 85 BPM

## 2020-12-07 PROCEDURE — 71045 X-RAY EXAM CHEST 1 VIEW: CPT

## 2020-12-07 PROCEDURE — 99284 EMERGENCY DEPT VISIT MOD MDM: CPT

## 2020-12-07 PROCEDURE — 93005 ELECTROCARDIOGRAM TRACING: CPT

## 2020-12-07 RX ORDER — ALBUTEROL SULFATE 90 UG/1
2 AEROSOL, METERED RESPIRATORY (INHALATION)
Qty: 1 INHALER | Refills: 1 | Status: SHIPPED | OUTPATIENT
Start: 2020-12-07 | End: 2020-12-14

## 2020-12-07 RX ORDER — PREDNISONE 20 MG/1
TABLET ORAL
Qty: 20 TAB | Refills: 0 | Status: SHIPPED | OUTPATIENT
Start: 2020-12-07 | End: 2021-09-21 | Stop reason: ALTCHOICE

## 2020-12-07 RX ORDER — DOXYCYCLINE 100 MG/1
100 CAPSULE ORAL 2 TIMES DAILY
Qty: 14 CAP | Refills: 0 | Status: SHIPPED | OUTPATIENT
Start: 2020-12-07 | End: 2021-09-21 | Stop reason: ALTCHOICE

## 2020-12-07 NOTE — DISCHARGE INSTRUCTIONS
Patient Education        10 Things to Do When You Have COVID-19    Stay home. Don't go to school, work, or public areas. And don't use public transportation, ride-shares, or taxis unless you have no choice. Leave your home only if you need to get medical care. But call the doctor's office first so they know you're coming. And wear a cloth face cover. Ask before leaving isolation. Talk with your doctor or other health professional about when it will be safe for you to leave isolation. Wear a cloth face cover when you are around other people. It can help stop the spread of the virus when you cough or sneeze. Limit contact with people in your home. If possible, stay in a separate bedroom and use a separate bathroom. Avoid contact with pets and other animals. If possible, have a friend or family member care for them while you're sick. Cover your mouth and nose with a tissue when you cough or sneeze. Then throw the tissue in the trash right away. Wash your hands often, especially after you cough or sneeze. Use soap and water, and scrub for at least 20 seconds. If soap and water aren't available, use an alcohol-based hand . Don't share personal household items. These include bedding, towels, cups and glasses, and eating utensils. Clean and disinfect your home every day. Use household  or disinfectant wipes or sprays. Take special care to clean things that you grab with your hands. These include doorknobs, remote controls, phones, and handles on your refrigerator and microwave. And don't forget countertops, tabletops, bathrooms, and computer keyboards. Take acetaminophen (Tylenol) to relieve fever and body aches. Read and follow all instructions on the label. Current as of: July 10, 2020               Content Version: 12.6  © 2006-2020 [employer].    Care instructions adapted under license by [employer] (which disclaims liability or warranty for this information). If you have questions about a medical condition or this instruction, always ask your healthcare professional. [place] [address] any warranty or liability for your use of this information.

## 2020-12-07 NOTE — ED NOTES
Patient ambulated around room. Oxygen remained 95%. Patient appeared more short of breath than baseline but not in distress.  MD made aware

## 2020-12-07 NOTE — ED TRIAGE NOTES
Triage Note: Patient arrived from home with c/o dizziness that started this morning while taking a bath. +cough. Patient is currently being treated for covid.

## 2020-12-08 ENCOUNTER — PATIENT OUTREACH (OUTPATIENT)
Dept: CASE MANAGEMENT | Age: 50
End: 2020-12-08

## 2020-12-08 NOTE — PROGRESS NOTES
Patient contacted regarding COVID-19 diagnosis. Discussed COVID-19 related testing which was available at this time. Test results were positive. Patient informed of results, if available? No. Outreach made within 2 business days of discharge: Yes    Care Transition Nurse/ Ambulatory Care Manager/ LPN Care Coordinator contacted the patient by telephone to perform post discharge assessment. Verified name and  with patient as identifiers. Provided introduction to self, and explanation of the CTN/ACM/LPN role, and reason for call due to risk factors for infection and/or exposure to COVID-19. Symptoms reviewed with patient who verbalized the following symptoms: cough, no new symptoms and no worsening symptoms. Due to no new or worsening symptoms encounter was not routed to provider for escalation. Discussed follow-up appointments. If no appointment was previously scheduled, appointment scheduling offered: no. Will schedule virtual visit as needed. Plans to discuss need for further testing with Dr. Abhishek Gonsalez. [facility] follow up appointment(s): No future appointments. Patient communicating with provider via [employer]. Non-[facility] follow up appointment(s): n/a      Advance Care Planning:   Does patient have an Advance Directive: not discussed at this time. Patient has following risk factors of: no known risk factors. CTN/ACM/LPN reviewed discharge instructions, medical action plan and red flags such as increased shortness of breath, increasing fever and signs of decompensation with patient who verbalized understanding. Discussed exposure protocols and quarantine with CDC Guidelines What to do if you are sick with coronavirus disease .  Patient was given an opportunity for questions and concerns. The patient agrees to contact the [facility] exposure line [phone], [facility] department [address]  ([phone]) and PCP office for questions related to their healthcare.  CTN/ACM provided contact information for future needs. Reviewed and educated patient on any new and changed medications related to discharge diagnosis. Patient/family/caregiver given information for [employer] and agrees to enroll yes  Patient's preferred e-mail:  n/a  Patient's preferred phone number: [phone]  Based on Loop alert triggers, patient will be contacted by nurse care manager for worsening symptoms. Pt will be further monitored by COVID Loop Team based on severity of symptoms and risk factors.

## 2020-12-09 ENCOUNTER — PATIENT MESSAGE (OUTPATIENT)
Dept: FAMILY MEDICINE CLINIC | Age: 50
End: 2020-12-09

## 2020-12-09 DIAGNOSIS — J06.9 UPPER RESPIRATORY TRACT INFECTION, UNSPECIFIED TYPE: Primary | ICD-10-CM

## 2020-12-10 RX ORDER — BENZONATATE 100 MG/1
100 CAPSULE ORAL
Qty: 20 CAP | Refills: 0 | Status: SHIPPED | OUTPATIENT
Start: 2020-12-10 | End: 2021-09-21 | Stop reason: ALTCHOICE

## 2020-12-10 NOTE — TELEPHONE ENCOUNTER
Rigo Gauthier MD 12/10/2020 8:18 AM EST      ----- Message -----  From: Cyrus Schmidt  Sent: 12/9/2020 9:45 AM EST  To: [facility] Nurse Pool  Subject: Prescription Question     Hi Dr Viki Ferrari    We had a virtual last week I am on day 12 of Covid feeling much better now however have a lingering cough that is very annoying at night I have tried Mucinex and otc cough meds but not really working can you call it something stronger so I can take at night snd get some sleep . Thank you.  Turner Miles

## 2021-01-12 ENCOUNTER — PATIENT MESSAGE (OUTPATIENT)
Dept: FAMILY MEDICINE CLINIC | Age: 51
End: 2021-01-12

## 2021-01-12 DIAGNOSIS — E66.9 OBESITY, CLASS I, BMI 30-34.9: ICD-10-CM

## 2021-01-13 DIAGNOSIS — U07.1 COVID-19: ICD-10-CM

## 2021-01-13 DIAGNOSIS — R79.89 ELEVATED LFTS: ICD-10-CM

## 2021-01-13 DIAGNOSIS — K76.0 FATTY LIVER: ICD-10-CM

## 2021-01-13 DIAGNOSIS — E55.9 VITAMIN D DEFICIENCY: Primary | ICD-10-CM

## 2021-01-13 DIAGNOSIS — R73.9 ELEVATED BLOOD SUGAR: ICD-10-CM

## 2021-01-22 RX ORDER — PHENTERMINE HYDROCHLORIDE 37.5 MG/1
37.5 TABLET ORAL
Qty: 30 TAB | Refills: 0 | Status: SHIPPED | OUTPATIENT
Start: 2021-01-22 | End: 2021-03-16 | Stop reason: SDUPTHER

## 2021-01-22 NOTE — TELEPHONE ENCOUNTER
From: Abad Bowman  Sent: 1/22/2021 8:42 AM EST  [facility]  Subject: RE: Visit Follow-Up Question    Rick No     I sent my request for phentermerine refill the other day. Can you have Dr Kai Britt send it in. If possible can I get 2 months supply if not then the 30 days is fine thanks and have a blessed day .  Be safe

## 2021-02-05 ENCOUNTER — TELEPHONE (OUTPATIENT)
Dept: FAMILY MEDICINE CLINIC | Age: 51
End: 2021-02-05

## 2021-02-05 NOTE — TELEPHONE ENCOUNTER
Looks like it went to [facility]. Called Ritika and requested she shred paper work.      Faxed Add on form to  Lab

## 2021-02-05 NOTE — TELEPHONE ENCOUNTER
----- Message from [facility] sent at 2/5/2021 10:22 AM EST -----  Regarding: Dr. Prescott Matters  General Message/Vendor Calls    Caller's first and last name:  Benjamin Pryor with [facility]      Reason for call:  Clarification on add-on form      Callback required yes/no and why:  Y      Best contact number(s):  [phone]      Details to clarify the request:    Benjamin Pryor is requesting a call back because she keeps getting an add-on form for the patient, but the specimen number is not a [facility] specimen number, and she can't find the patient in your system. She needs clarification on whether this is for [facility] or for a different lab and what she needs to do to with the form.       [facility]

## 2021-03-16 ENCOUNTER — TELEPHONE (OUTPATIENT)
Dept: FAMILY MEDICINE CLINIC | Age: 51
End: 2021-03-16

## 2021-03-16 DIAGNOSIS — E66.9 OBESITY, CLASS I, BMI 30-34.9: ICD-10-CM

## 2021-03-16 RX ORDER — PHENTERMINE HYDROCHLORIDE 37.5 MG/1
37.5 TABLET ORAL
Qty: 30 TAB | Refills: 0 | Status: SHIPPED | OUTPATIENT
Start: 2021-03-16 | End: 2021-06-03 | Stop reason: SDUPTHER

## 2021-04-27 ENCOUNTER — PATIENT MESSAGE (OUTPATIENT)
Dept: FAMILY MEDICINE CLINIC | Age: 51
End: 2021-04-27

## 2021-04-28 NOTE — TELEPHONE ENCOUNTER
TC to Patient. ID verified. Calling to advise chema for his bad experience with DR. Felicia Parker. Will send him the referral information for DR. Jemima Rodriguez     Patient advised once testing is complete then we can request DR. Pop order medication. Advised it will probably require a prior auth as well. Just to prepare him. Also advised some [employer] require Drug screens as well.

## 2021-05-11 ENCOUNTER — VIRTUAL VISIT (OUTPATIENT)
Dept: FAMILY MEDICINE CLINIC | Age: 51
End: 2021-05-11
Payer: COMMERCIAL

## 2021-05-11 DIAGNOSIS — R42 DIZZINESS: Primary | ICD-10-CM

## 2021-05-11 DIAGNOSIS — E66.9 OBESITY, CLASS I, BMI 30-34.9: ICD-10-CM

## 2021-05-11 DIAGNOSIS — K21.9 GASTROESOPHAGEAL REFLUX DISEASE WITHOUT ESOPHAGITIS: ICD-10-CM

## 2021-05-11 DIAGNOSIS — F41.9 ANXIETY: ICD-10-CM

## 2021-05-11 DIAGNOSIS — R06.02 SOB (SHORTNESS OF BREATH): ICD-10-CM

## 2021-05-11 DIAGNOSIS — U09.9 POST-COVID-19 CONDITION: ICD-10-CM

## 2021-05-11 PROCEDURE — 99214 OFFICE O/P EST MOD 30 MIN: CPT | Performed by: FAMILY MEDICINE

## 2021-05-11 RX ORDER — MECLIZINE HYDROCHLORIDE 25 MG/1
25 TABLET ORAL
Qty: 30 TAB | Refills: 0 | Status: SHIPPED | OUTPATIENT
Start: 2021-05-11 | End: 2021-05-21

## 2021-05-11 RX ORDER — OMEPRAZOLE 40 MG/1
40 CAPSULE, DELAYED RELEASE ORAL DAILY
Qty: 30 CAP | Refills: 2 | Status: SHIPPED | OUTPATIENT
Start: 2021-05-11 | End: 2021-09-21 | Stop reason: ALTCHOICE

## 2021-05-11 RX ORDER — ALPRAZOLAM 1 MG/1
1 TABLET ORAL
Qty: 20 TAB | Refills: 0 | Status: SHIPPED | OUTPATIENT
Start: 2021-05-11 | End: 2021-08-03 | Stop reason: SDUPTHER

## 2021-05-11 NOTE — PROGRESS NOTES
Jose Leon is a 45 y.o. male who was seen by synchronous (real-time) audio-video technology on 5/11/2021. Consent:  Services were provided through a video synchronous discussion virtually to substitute for in-person appointment. He and/or his healthcare decision maker is aware that this patient-initiated Telehealth encounter is a billable service, with coverage as determined by his insurance carrier. He is aware that he may receive a bill and has provided verbal consent to proceed: Yes    I was in the office while conducting this encounter. Subjective:   Jose Leon was seen for No chief complaint on file. Obesity: I have reviewed/discussed the above normal BMI with the patient. I have recommended the following interventions: dietary management education, guidance, and counseling, encourage exercise and monitor weight . He continues with Apidex-P 37.5 mg/day. I advised pt that if he does not see a 25% reduction in his weight, we will reassess weight loss treatments. Pt notes that he has begun to develop vertigo in addition to \"chest burning\". He wonders if this may be GERD, though he has no hx of this condition. Pt has also noticed that he is \"more winded\" when exercising and he feels SOB with daily activities, such as walking up the stairs. For the vertigo, I have prescribed pt meclizine 25 mg/TID PRN to help alleviate his sxs as they occur. I advised pt to take this medication for one week before we reassess. To treat pt's GERD-like symptoms, I have prescribed pt Prilosec 40 mg/day. I advised pt to take this medication for one week before we reassess. For pt's SOB and chest \"burning\", I have placed orders for pt to have labwork performed at the [facility] stand alone lab for further assessment of potential causative factors. Additionally, I have placed orders for pt to have a chest x-ray performed to assess his lung function.   I have also placed orders for pt to undergo an echocardiogram for further assessment. Prior to Admission medications    Medication Sig Start Date End Date Taking? Authorizing Provider   meclizine (ANTIVERT) 25 mg tablet Take 1 Tab by mouth three (3) times daily as needed for Dizziness for up to 10 days. Indications: sensation of spinning or whirling 5/11/21 5/21/21 Yes Alexx Gonzales MD   omeprazole (PRILOSEC) 40 mg capsule Take 1 Cap by mouth daily. 5/11/21  Yes Alexx Gonzales MD   phentermine (ADIPEX-P) 37.5 mg tablet Take 1 Tab by mouth every morning. Max Daily Amount: 37.5 mg. 4/22/21  Yes Alexx Gonzales MD   phentermine (ADIPEX-P) 37.5 mg tablet Take 1 Tab by mouth every morning. Max Daily Amount: 37.5 mg. 3/16/21  Yes Alexx Gonzales MD   benzonatate (TESSALON) 100 mg capsule Take 1 Cap by mouth three (3) times daily as needed for Cough. 12/10/20  Yes Jonny Harper MD   doxycycline (MONODOX) 100 mg capsule Take 1 Cap by mouth two (2) times a day. 12/7/20  Yes Jeff Palencia MD   predniSONE (DELTASONE) 20 mg tablet 2 qd for 4 d. 1 1/2 qd for 4 d. 1 qd for 4 d, 1/2 qd for 4 d. 12/7/20  Yes Jeff Palencia MD   ALPRAZolam Cali Marin) 0.5 mg tablet Take 1 Tab by mouth daily as needed for Anxiety. 11/30/20  Yes Alexx Gonzales MD   zolpidem (Ambien) 5 mg tablet Take 5 mg by mouth nightly as needed for Sleep. Yes Kristy Miranda MD   acetaminophen (TYLENOL) 500 mg tablet Take 2 Tabs by mouth every six (6) hours as needed for Pain or Fever. 11/29/20  Yes Melina Thomas MD   cholecalciferol, vitamin D3, (VITAMIN D3) 2,000 unit tab Take 2,000 Units by mouth. Yes Provider, Historical   magnesium 250 mg tab Take  by mouth daily.    Yes Provider, Historical     No Known Allergies  Past Medical History:   Diagnosis Date    ADD (attention deficit disorder)     Alcohol use     ED (erectile dysfunction)     Liver disease     FATTY liver    Preglaucoma of both eyes      Past Surgical History:   Procedure Laterality Date    HX HEENT      wisdom    HX ORTHOPAEDIC      knee left     Family History   Problem Relation Age of Onset   Nina Sit Glaucoma Father     Hypertension Father     Stroke Sister      Social History     Tobacco Use    Smoking status: Never Smoker    Smokeless tobacco: Never Used    Tobacco comment: occasional    Substance Use Topics    Alcohol use: Yes     Alcohol/week: 43.0 standard drinks     Types: 15 Standard drinks or equivalent, 28 Shots of liquor per week     Frequency: 4 or more times a week     Drinks per session: 3 or 4     Comment: 3-4 drinks per day        Review of Systems   Constitutional: Negative for chills and fever. HENT: Negative for hearing loss and tinnitus. Eyes: Negative for blurred vision and double vision. Respiratory: Negative for cough and shortness of breath. Cardiovascular: Negative for chest pain and palpitations. Gastrointestinal: Negative for nausea and vomiting. Genitourinary: Negative for dysuria and frequency. Musculoskeletal: Negative for back pain and falls. Skin: Negative for itching and rash. Neurological: Negative for dizziness, loss of consciousness and headaches. Endo/Heme/Allergies: Negative. Psychiatric/Behavioral: Negative for depression. The patient is not nervous/anxious. PHYSICAL EXAMINATION:  Vital Signs: (As obtained by patient/caregiver at home)  There were no vitals taken for this visit.      Constitutional: [x] Appears well-developed and well-nourished [x] No apparent distress      [] Abnormal -     Mental status: [x] Alert and awake  [x] Oriented to person/place/time [x] Able to follow commands    [] Abnormal -     Eyes:   EOM    [x]  Normal    [] Abnormal -   Sclera  [x]  Normal    [] Abnormal -          Discharge [x]  None visible   [] Abnormal -     HENT: [x] Normocephalic, atraumatic  [] Abnormal -   [x] Mouth/Throat: Mucous membranes are moist    External Ears [x] Normal  [] Abnormal -    Neck: [x] No visualized mass [] Abnormal -     Pulmonary/Chest: [x] Respiratory effort normal   [x] No visualized signs of difficulty breathing or respiratory distress        [] Abnormal -      Musculoskeletal:   [x] Normal gait with no signs of ataxia         [x] Normal range of motion of neck        [] Abnormal -     Neurological:        [x] No Facial Asymmetry (Cranial nerve 7 motor function) (limited exam due to video visit)          [x] No gaze palsy        [] Abnormal -          Skin:        [x] No significant exanthematous lesions or discoloration noted on facial skin         [] Abnormal -            Psychiatric:       [x] Normal Affect [] Abnormal -        [x] No Hallucinations    Other pertinent observable physical exam findings:-    Assessment & Plan:   Diagnoses and all orders for this visit:    1. Dizziness  Refer to #4. For the vertigo, I have prescribed pt meclizine 25 mg/TID PRN to help alleviate his sxs as they occur. I advised pt to take this medication for one week before we reassess.   -     meclizine (ANTIVERT) 25 mg tablet; Take 1 Tab by mouth three (3) times daily as needed for Dizziness for up to 10 days. Indications: sensation of spinning or whirling    2. Gastroesophageal reflux disease without esophagitis  Refer to #4. To treat pt's GERD-like symptoms, I have prescribed pt Prilosec 40 mg/day. I advised pt to take this medication for one week before we reassess.  -     omeprazole (PRILOSEC) 40 mg capsule; Take 1 Cap by mouth daily. 3. SOB (shortness of breath)  Refer to #4. For pt's SOB and chest \"burning\", I have placed orders for pt to have labwork performed at the [facility] stand alone lab for further assessment of potential causative factors. Additionally, I have placed orders for pt to have a chest x-ray performed to assess his lung function. I have also placed orders for pt to undergo an echocardiogram for further assessment. -     XR CHEST PA LAT; Future  -     ECHO STRESS; Future    4.  Post-COVID-19 condition  Pt notes that he has begun to develop vertigo in addition to \"chest burning\". He wonders if this may be GERD, though he has no hx of this condition. Pt has also noticed that he is \"more winded\" when exercising and he feels SOB with daily activities, such as walking up the stairs.   -     METABOLIC PANEL, COMPREHENSIVE; Future  -     TSH 3RD GENERATION; Future  -     T4, FREE; Future  -     CBC WITH AUTOMATED DIFF; Future    5. Obesity, Class I, BMI 30-34.9   I have reviewed/discussed the above normal BMI with the patient. I have recommended the following interventions: dietary management education, guidance, and counseling, encourage exercise and monitor weight . He continues with Apidex-P 37.5 mg/day. I advised pt that if he does not see a 25% reduction in his weight, we will reassess weight loss treatments. 6. Anxiety  Pt requests a refill of their medication, which I have granted. The Prescription Monitoring Program has been reviewed for recent activity regarding controlled substances for this patient. -     ALPRAZolam (XANAX) 1 mg tablet; Take 1 Tab by mouth nightly as needed for Anxiety. Max Daily Amount: 1 mg. Follow-up and Dispositions    · Return in about 3 months (around 8/11/2021) for follow up. We discussed the expected course, resolution and complications of the diagnosis(es) in detail. Medication risks, benefits, costs, interactions, and alternatives were discussed as indicated. I advised her to contact the office if her condition worsens, changes or fails to improve as anticipated. She expressed understanding with the diagnosis(es) and plan.      Pursuant to the emergency declaration under the [address] and [place] [address] waiver authority and the [employer] Act, this Virtual Visit was conducted, with patient's consent, to reduce the patient's risk of exposure to COVID-19 and provide continuity of care for an established patient. Services were provided through a video synchronous discussion virtually to substitute for in-person clinic visit. Written by audrey Mancuso, as dictated by Kristian James M.D.    4:43 PM - 4:56 PM    Total time spent with the patient 13 minutes, greater than 50% of time spent counseling patient.

## 2021-05-27 ENCOUNTER — HOSPITAL ENCOUNTER (OUTPATIENT)
Dept: GENERAL RADIOLOGY | Age: 51
Discharge: HOME | End: 2021-05-27
Attending: FAMILY MEDICINE
Payer: COMMERCIAL

## 2021-05-27 DIAGNOSIS — R06.02 SOB (SHORTNESS OF BREATH): ICD-10-CM

## 2021-05-27 PROCEDURE — 71046 X-RAY EXAM CHEST 2 VIEWS: CPT

## 2021-06-02 ENCOUNTER — PATIENT MESSAGE (OUTPATIENT)
Dept: FAMILY MEDICINE CLINIC | Age: 51
End: 2021-06-02

## 2021-06-02 DIAGNOSIS — E66.9 OBESITY, CLASS I, BMI 30-34.9: ICD-10-CM

## 2021-06-03 DIAGNOSIS — E66.9 OBESITY, CLASS I, BMI 30-34.9: ICD-10-CM

## 2021-06-06 RX ORDER — PHENTERMINE HYDROCHLORIDE 37.5 MG/1
37.5 TABLET ORAL
Qty: 30 TABLET | Refills: 0 | Status: SHIPPED | OUTPATIENT
Start: 2021-06-06 | End: 2021-08-23 | Stop reason: SDUPTHER

## 2021-06-06 RX ORDER — PHENTERMINE HYDROCHLORIDE 37.5 MG/1
37.5 TABLET ORAL
Qty: 30 TABLET | Refills: 0 | Status: SHIPPED | OUTPATIENT
Start: 2021-06-06 | End: 2021-09-21 | Stop reason: ALTCHOICE

## 2021-06-22 DIAGNOSIS — M54.50 CHRONIC LOW BACK PAIN, UNSPECIFIED BACK PAIN LATERALITY, UNSPECIFIED WHETHER SCIATICA PRESENT: Primary | ICD-10-CM

## 2021-06-22 DIAGNOSIS — G89.29 CHRONIC LOW BACK PAIN, UNSPECIFIED BACK PAIN LATERALITY, UNSPECIFIED WHETHER SCIATICA PRESENT: Primary | ICD-10-CM

## 2021-07-29 ENCOUNTER — PATIENT MESSAGE (OUTPATIENT)
Dept: FAMILY MEDICINE CLINIC | Age: 51
End: 2021-07-29

## 2021-07-29 NOTE — TELEPHONE ENCOUNTER
From: Syed Tesfaye  To: Jayla Ku MD  Sent: 7/29/2021 11:29 AM EDT  Subject: Non-Urgent Didi Scraplucas Quinn     I had Covid in December. I am not crazy about getting the vaccine as Im concerned about serious side effects in people who have had Covid . What is your opinion on me getting the vaccine and if so which one would you recommend? I would prefer to rely on aquired immunity but would love your input     Note I was positive for antibodies last test is there a more detailed blood test I can do ?

## 2021-08-03 DIAGNOSIS — F41.9 ANXIETY: ICD-10-CM

## 2021-08-04 NOTE — TELEPHONE ENCOUNTER
Patient Comment: Froy Scherer I would like a refill please as I use Xanax only a few times a month to help me sleep due to stress with work finances and having nothing kids leaving for college as you see I use them sparingly as I only had 20 pills since may 11.  Can you refill 1mg 30 pills and I will continue to use very sparingly thank you

## 2021-08-05 RX ORDER — TADALAFIL 10 MG/1
10 TABLET ORAL
COMMUNITY
End: 2021-08-05 | Stop reason: SDUPTHER

## 2021-08-06 RX ORDER — TADALAFIL 10 MG/1
10 TABLET ORAL
Qty: 20 TABLET | Refills: 0 | Status: SHIPPED | OUTPATIENT
Start: 2021-08-06

## 2021-08-06 RX ORDER — ALPRAZOLAM 1 MG/1
1 TABLET ORAL
Qty: 15 TABLET | Refills: 0 | Status: SHIPPED | OUTPATIENT
Start: 2021-08-06 | End: 2021-11-09 | Stop reason: SDUPTHER

## 2021-08-19 DIAGNOSIS — U09.9 POST-COVID-19 CONDITION: Primary | ICD-10-CM

## 2021-08-20 DIAGNOSIS — E66.9 OBESITY, CLASS I, BMI 30-34.9: ICD-10-CM

## 2021-08-20 RX ORDER — PHENTERMINE HYDROCHLORIDE 37.5 MG/1
37.5 TABLET ORAL
Qty: 30 TABLET | Refills: 0 | Status: CANCELLED | OUTPATIENT
Start: 2021-08-20

## 2021-08-24 RX ORDER — PHENTERMINE HYDROCHLORIDE 37.5 MG/1
37.5 TABLET ORAL
Qty: 30 TABLET | Refills: 0 | Status: SHIPPED | OUTPATIENT
Start: 2021-08-24 | End: 2021-09-21 | Stop reason: ALTCHOICE

## 2021-09-07 ENCOUNTER — PATIENT MESSAGE (OUTPATIENT)
Dept: FAMILY MEDICINE CLINIC | Age: 51
End: 2021-09-07

## 2021-09-07 DIAGNOSIS — F90.9 ATTENTION DEFICIT HYPERACTIVITY DISORDER (ADHD), UNSPECIFIED ADHD TYPE: Primary | ICD-10-CM

## 2021-09-08 NOTE — TELEPHONE ENCOUNTER
From: Christobal Bolus  To: Kimber Hung MD  Sent: 9/7/2021 6:44 PM EDT  Subject: Prescription Question    Hi Dr Deanne Rosario     Some friends who have used vyvanase for binge eating disorder recommended I try it as a change from phentermerine as its more long term. I definitely would like to try it . Can you prescribe?

## 2021-09-21 ENCOUNTER — OFFICE VISIT (OUTPATIENT)
Dept: FAMILY MEDICINE CLINIC | Age: 51
End: 2021-09-21
Payer: COMMERCIAL

## 2021-09-21 VITALS
WEIGHT: 216 LBS | OXYGEN SATURATION: 96 % | TEMPERATURE: 98.3 F | SYSTOLIC BLOOD PRESSURE: 124 MMHG | BODY MASS INDEX: 31.99 KG/M2 | HEIGHT: 69 IN | DIASTOLIC BLOOD PRESSURE: 82 MMHG | RESPIRATION RATE: 16 BRPM | HEART RATE: 86 BPM

## 2021-09-21 DIAGNOSIS — R07.89 CHEST TIGHTNESS: ICD-10-CM

## 2021-09-21 DIAGNOSIS — E66.9 OBESITY, CLASS I, BMI 30-34.9: ICD-10-CM

## 2021-09-21 DIAGNOSIS — Z00.00 PHYSICAL EXAM: Primary | ICD-10-CM

## 2021-09-21 DIAGNOSIS — F98.8 ATTENTION DEFICIT DISORDER, UNSPECIFIED HYPERACTIVITY PRESENCE: ICD-10-CM

## 2021-09-21 DIAGNOSIS — Z23 ENCOUNTER FOR IMMUNIZATION: ICD-10-CM

## 2021-09-21 DIAGNOSIS — R06.02 SOB (SHORTNESS OF BREATH): ICD-10-CM

## 2021-09-21 PROCEDURE — 99396 PREV VISIT EST AGE 40-64: CPT | Performed by: FAMILY MEDICINE

## 2021-09-21 RX ORDER — PHENTERMINE HYDROCHLORIDE 37.5 MG/1
37.5 TABLET ORAL
Qty: 30 TABLET | Refills: 0 | Status: CANCELLED | OUTPATIENT
Start: 2021-09-21

## 2021-09-21 RX ORDER — ZOSTER VACCINE RECOMBINANT, ADJUVANTED 50 MCG/0.5
0.5 KIT INTRAMUSCULAR ONCE
Qty: 0.5 ML | Refills: 1 | Status: SHIPPED | OUTPATIENT
Start: 2021-09-21 | End: 2021-09-21

## 2021-09-21 NOTE — PROGRESS NOTES
Chief Complaint   Patient presents with    Complete Physical     1. \"Have you been to the ER, urgent care clinic since your last visit? Hospitalized since your last visit? \" no    2. \"Have you seen or consulted any other health care providers outside of the [address] [place] [address] since your last visit? \"  no    3. For patients aged 39-70: Has the patient had a colonoscopy? yes    If the patient is female:    4. For patients aged 41-77: Has the patient had a mammogram within the past 2 years? n/a    5. For patients aged 21-65: Has the patient had a pap smear?    N/a

## 2021-09-21 NOTE — PROGRESS NOTES
ABBIE Messer  45 y.o. male  presents to the office today for a complete physical.    Blood pressure 124/82, pulse 86, temperature 98.3 °F (36.8 °C), temperature source Temporal, resp. rate 16, height 5' 9\" (1.753 m), weight 216 lb (98 kg), SpO2 96 %. Body mass index is 31.9 kg/m². Chief Complaint   Patient presents with    Complete Physical      CPE: Pt presents today for a CPE, which I performed. All findings were normal.     Chest Tightness: Pt denies chest tightness in the office today but does report an episode of transient chest tightness with associated SOB ~1 month ago. He denies any recurrent sxs, and feels fine. I had previously ordered one on 05/11/21, but he never completed this. I have ordered a Cardiac Echo Test to be performed in the near future to evaluate his sxs. ADHD/Binge Eating: Pt continues on Adipex 37.5mg daily but notes that he often gets hungry at night and finds that he binge eats. He has noticed associated weight gain and would like to try a different medication. Pt reports that in the past he has found success in weight loss while being on a Meditteranean diet and exercising regularly. Obesity: I have reviewed/discussed the above normal BMI with the patient. I have recommended the following interventions: dietary management education, guidance, and counseling, encourage exercise and monitor weight. Health Maintenance: Pt endorses he received his first COVID-19 vaccination (Pfizer) vaccine 3 weeks ago but does not know the exact date. I advised pt that he needs Shingrix vaccine in the future, but that he should wait until after his second dose of the Pfizer vaccine. .    Current Outpatient Medications   Medication Sig Dispense Refill    lisdexamfetamine (VYVANSE) 40 mg capsule Take 1 Capsule by mouth daily. Max Daily Amount: 40 mg.  Indications: attention deficit disorder with hyperactivity, binge eating disorder 90 Capsule 0    varicella-zoster recombinant, PF, (Shingrix, PF,) 50 mcg/0.5 mL susr injection 0.5 mL by IntraMUSCular route once for 1 dose. 0.5 mL 1    ALPRAZolam (XANAX) 1 mg tablet Take 1 Tablet by mouth nightly as needed for Anxiety. Max Daily Amount: 1 mg. 15 Tablet 0    tadalafiL (CIALIS) 10 mg tablet Take 1 Tablet by mouth daily as needed for Erectile Dysfunction. 20 Tablet 0    zolpidem (Ambien) 5 mg tablet Take 5 mg by mouth nightly as needed for Sleep.  cholecalciferol, vitamin D3, (VITAMIN D3) 2,000 unit tab Take 2,000 Units by mouth.  magnesium 250 mg tab Take  by mouth daily. No Known Allergies  Past Medical History:   Diagnosis Date    ADD (attention deficit disorder)     Alcohol use     ED (erectile dysfunction)     Liver disease     FATTY liver    Preglaucoma of both eyes      Past Surgical History:   Procedure Laterality Date    HX HEENT      wisdom    HX ORTHOPAEDIC      knee left     Family History   Problem Relation Age of Onset    Glaucoma Father     Hypertension Father     Stroke Sister      Social History     Tobacco Use    Smoking status: Never Smoker    Smokeless tobacco: Never Used    Tobacco comment: occasional    Substance Use Topics    Alcohol use: Yes     Alcohol/week: 43.0 standard drinks     Types: 28 Shots of liquor, 15 Standard drinks or equivalent per week     Comment: 3-4 drinks per day        Review of Systems   Constitutional: Negative for chills and fever. HENT: Negative for hearing loss and tinnitus. Eyes: Negative for blurred vision and double vision. Respiratory: Negative for cough and shortness of breath. Cardiovascular: Negative for chest pain and palpitations. Gastrointestinal: Negative for nausea and vomiting. Genitourinary: Negative for dysuria and frequency. Musculoskeletal: Negative for back pain and falls. Skin: Negative for itching and rash. Neurological: Negative for dizziness, loss of consciousness and headaches. Endo/Heme/Allergies: Negative. Psychiatric/Behavioral: Negative for depression. The patient is not nervous/anxious. Physical Exam  Vitals reviewed. Constitutional:       Appearance: Normal appearance. HENT:      Head: Normocephalic and atraumatic. Right Ear: Tympanic membrane, ear canal and external ear normal.      Left Ear: Tympanic membrane, ear canal and external ear normal.      Nose: Nose normal.      Mouth/Throat:      Mouth: Mucous membranes are moist.      Pharynx: Oropharynx is clear. Eyes:      Extraocular Movements: Extraocular movements intact. Conjunctiva/sclera: Conjunctivae normal.      Pupils: Pupils are equal, round, and reactive to light. Cardiovascular:      Rate and Rhythm: Normal rate and regular rhythm. Pulses: Normal pulses. Heart sounds: Normal heart sounds. Pulmonary:      Effort: Pulmonary effort is normal.      Breath sounds: Normal breath sounds. Abdominal:      General: Abdomen is flat. Bowel sounds are normal.      Palpations: Abdomen is soft. Genitourinary:     Prostate: Enlarged (smooth,symmetrical). No nodules present. Rectum: Guaiac result negative. Musculoskeletal:         General: Normal range of motion. Cervical back: Normal range of motion and neck supple. Skin:     General: Skin is warm and dry. Neurological:      General: No focal deficit present. Mental Status: He is alert and oriented to person, place, and time. Psychiatric:         Mood and Affect: Mood normal.         Behavior: Behavior normal.         Judgment: Judgment normal.           ASSESSMENT and PLAN  Diagnoses and all orders for this visit:    1. Physical exam  Pt presents today for a CPE, which I performed. All findings were normal.   -     METABOLIC PANEL, COMPREHENSIVE; Future  -     CBC WITH AUTOMATED DIFF; Future  -     LIPID PANEL; Future  -     TSH 3RD GENERATION; Future  -     T4, FREE; Future  -     PSA W/ REFLX FREE PSA;  Future  -     URINALYSIS W/MICROSCOPIC; Future    2. Obesity, Class I, BMI 30-34.9  Refer to #3.  -     lisdexamfetamine (VYVANSE) 40 mg capsule; Take 1 Capsule by mouth daily. Max Daily Amount: 40 mg. Indications: attention deficit disorder with hyperactivity, binge eating disorder    3. Attention deficit disorder, unspecified hyperactivity presence  Pt continues on Adipex 37.5mg daily but notes that he often gets hungry at night and finds that he binge eats. He has noticed associated weight gain and would like to try a different medication. Pt reports that in the past he has found success in weight loss while being on a Meditteranean diet and exercising regularly. I have reviewed/discussed the above normal BMI with the patient. I have recommended the following interventions: dietary management education, guidance, and counseling, encourage exercise and monitor weight.   -     lisdexamfetamine (VYVANSE) 40 mg capsule; Take 1 Capsule by mouth daily. Max Daily Amount: 40 mg. Indications: attention deficit disorder with hyperactivity, binge eating disorder    4. Encounter for immunization  I advised pt that he needs Shingrix vaccine in the future, but that he should wait until after his second dose of the Pfizer vaccine. .  -     varicella-zoster recombinant, PF, (Shingrix, PF,) 50 mcg/0.5 mL susr injection; 0.5 mL by IntraMUSCular route once for 1 dose. 5. Chest tightness  Pt denies chest tightness in the office today but does report an episode of transient chest tightness with associated SOB ~1 month ago. He denies any recurrent sxs, and feels fine. I had previously ordered one on 05/11/21, but he never completed this. I have ordered a Cardiac Echo Test to be performed in the near future to evaluate his sxs.  -     ECHO STRESS; Future    6. SOB (shortness of breath)  Refer to #5.  -     ECHO STRESS; Future    Follow-up and Dispositions    · Return in about 3 months (around 12/21/2021) for ADD.             Medication risks/benefits/costs/interactions/alternatives discussed with patient. Advised patient to call back or return to office if symptoms worsen/change/persist.  If patient cannot reach us or should anything more severe/urgent arise he/she should proceed directly to the nearest emergency department. Discussed expected course/resolution/complications of diagnosis in detail with patient. Patient given a written after visit summary which includes diagnoses, current medications and vitals. Patient expressed understanding with the diagnosis and plan.     Written by jany Nielsen, as dictated by Paige Galeazzi, M.D.

## 2021-10-14 DIAGNOSIS — K76.0 FATTY LIVER: ICD-10-CM

## 2021-10-14 DIAGNOSIS — R79.89 ELEVATED LIVER FUNCTION TESTS: Primary | ICD-10-CM

## 2021-10-14 DIAGNOSIS — E66.9 OBESITY, UNSPECIFIED CLASSIFICATION, UNSPECIFIED OBESITY TYPE, UNSPECIFIED WHETHER SERIOUS COMORBIDITY PRESENT: ICD-10-CM

## 2021-10-20 DIAGNOSIS — K76.0 FATTY LIVER: Primary | ICD-10-CM

## 2021-10-21 ENCOUNTER — TELEPHONE (OUTPATIENT)
Dept: HEMATOLOGY | Age: 51
End: 2021-10-21

## 2021-10-26 ENCOUNTER — HOSPITAL ENCOUNTER (OUTPATIENT)
Dept: CT IMAGING | Age: 51
Discharge: HOME | End: 2021-10-26
Attending: FAMILY MEDICINE
Payer: COMMERCIAL

## 2021-10-26 DIAGNOSIS — K76.0 FATTY LIVER: ICD-10-CM

## 2021-10-26 DIAGNOSIS — R79.89 ELEVATED LIVER FUNCTION TESTS: ICD-10-CM

## 2021-10-26 DIAGNOSIS — E66.9 OBESITY, UNSPECIFIED CLASSIFICATION, UNSPECIFIED OBESITY TYPE, UNSPECIFIED WHETHER SERIOUS COMORBIDITY PRESENT: ICD-10-CM

## 2021-10-26 PROCEDURE — 75571 CT HRT W/O DYE W/CA TEST: CPT

## 2021-11-08 NOTE — PROGRESS NOTES
As discussed this imaging study in December when I see you again. The findings are below:    FINDINGS:  The coronary calcium in each vessel is as follows:     Left main coronary artery: 0  Left anterior descending coronary artery: 0  Left circumflex coronary artery: 0  Right coronary artery: 0  Posterior descending coronary artery: 0     Total calcium score: 0      Calcium score interpretation:  0-0 = No evidence of CAD  1-10 = Minimal evidence of CAD   = Mild evidence of CAD  101-400 = Moderate evidence of CAD  >400 = Extensive evidence of CAD     Chest CT findings: There is a 4 mm right lower lobe pulmonary nodule (series 3,  image 61). A 3 mm right lower lobe nodule is also seen on series 3, image 46. These are not imaged on the prior study. The entire lungs are not imaged on this  examination. No evidence of airspace disease or lymphadenopathy. The  incidentally imaged portions of the upper abdominal organs are within normal  limits on this unenhanced examination. The bones are within normal limits.     IMPRESSION  Total calcium score of 0. No evidence of calcific coronary artery plaque. A  \"negative\" examination. There is a greater than 97% chance for the absence of  coronary artery disease. The result should be discussed with the patient taking  into account other risk factors such as age, gender, family history, diabetes,  smoking or high cholesterol levels. Tiny pulmonary nodules in the right lower  lobe are nonspecific; if there is a cancer or smoking history, consider  follow-up CT in 12 months to assure stability.           Specimen Collected: 10/26/21 15:23

## 2021-11-09 DIAGNOSIS — F41.9 ANXIETY: ICD-10-CM

## 2021-11-10 ENCOUNTER — HOSPITAL ENCOUNTER (OUTPATIENT)
Dept: NON INVASIVE DIAGNOSTICS | Age: 51
Discharge: HOME | End: 2021-11-10
Attending: FAMILY MEDICINE
Payer: COMMERCIAL

## 2021-11-10 VITALS
SYSTOLIC BLOOD PRESSURE: 147 MMHG | HEIGHT: 69 IN | BODY MASS INDEX: 31.1 KG/M2 | WEIGHT: 210 LBS | DIASTOLIC BLOOD PRESSURE: 82 MMHG

## 2021-11-10 DIAGNOSIS — R06.02 SOB (SHORTNESS OF BREATH): ICD-10-CM

## 2021-11-10 DIAGNOSIS — R07.89 CHEST TIGHTNESS: ICD-10-CM

## 2021-11-10 LAB
STRESS ANGINA INDEX: 0
STRESS BASELINE DIAS BP: 89 MMHG
STRESS BASELINE HR: 66 BPM
STRESS BASELINE SYS BP: 150 MMHG
STRESS ESTIMATED WORKLOAD: 13 METS
STRESS EXERCISE DUR MIN: NORMAL
STRESS PEAK DIAS BP: 89 MMHG
STRESS PEAK SYS BP: 150 MMHG
STRESS PERCENT HR ACHIEVED: 92 %
STRESS POST PEAK HR: 155 BPM
STRESS RATE PRESSURE PRODUCT: NORMAL BPM*MMHG
STRESS SR DUKE TREADMILL SCORE: 0
STRESS ST DEPRESSION: 0 MM
STRESS ST ELEVATION: 0 MM
STRESS STAGE 1 HR: 97 BPM
STRESS STAGE 2 BP: NORMAL MMHG
STRESS STAGE 2 HR: 107 BPM
STRESS STAGE 3 HR: 126 BPM
STRESS STAGE 4 HR: 157 BPM
STRESS STAGE RECOVERY 1 BP: NORMAL MMHG
STRESS STAGE RECOVERY 1 HR: 89 BPM
STRESS TARGET HR: 168 BPM

## 2021-11-10 PROCEDURE — 93351 STRESS TTE COMPLETE: CPT

## 2021-11-11 RX ORDER — ALPRAZOLAM 1 MG/1
1 TABLET ORAL
Qty: 15 TABLET | Refills: 0 | Status: SHIPPED | OUTPATIENT
Start: 2021-11-11 | End: 2022-08-23 | Stop reason: SDUPTHER

## 2021-11-12 DIAGNOSIS — R91.1 PULMONARY NODULE: Primary | ICD-10-CM

## 2021-11-12 DIAGNOSIS — E66.9 OBESITY, CLASS I, BMI 30-34.9: Primary | ICD-10-CM

## 2021-11-12 NOTE — TELEPHONE ENCOUNTER
phentermine (ADIPEX-P) 37.5 mg tablet [[id number]]  DISCONTINUED    Order Details  Dose: 37.5 mg Route: Oral Frequency: 7AM   Dispense Quantity: 30 Tablet Refills: 0          Sig: Take 1 Tablet by mouth every morning.  Max Daily Amount: 37.5 mg.           Patient continues to request refill for Phentermine

## 2021-11-15 RX ORDER — PHENTERMINE HYDROCHLORIDE 37.5 MG/1
37.5 TABLET ORAL
Qty: 30 TABLET | Refills: 0 | OUTPATIENT
Start: 2021-11-15

## 2021-11-15 NOTE — PROGRESS NOTES
[phone] 11/10/2021 Routine    Result Text  · Baseline ECG: Normal sinus rhythm. · Echo: No regional left ventricular wall motion abnormality noted. Wall motion unchanged from baseline. · Echo: Negative stress echocardiogram for evidence of ischemia.

## 2021-11-30 ENCOUNTER — VIRTUAL VISIT (OUTPATIENT)
Dept: FAMILY MEDICINE CLINIC | Age: 51
End: 2021-11-30
Payer: COMMERCIAL

## 2021-11-30 DIAGNOSIS — F98.8 ATTENTION DEFICIT DISORDER, UNSPECIFIED HYPERACTIVITY PRESENCE: Primary | ICD-10-CM

## 2021-11-30 DIAGNOSIS — E66.9 OBESITY, CLASS I, BMI 30-34.9: ICD-10-CM

## 2021-11-30 PROCEDURE — 99213 OFFICE O/P EST LOW 20 MIN: CPT | Performed by: FAMILY MEDICINE

## 2021-11-30 NOTE — PROGRESS NOTES
Alexander Villa is a 45 y.o. male who was seen by synchronous (real-time) audio-video technology on 11/30/2021. Consent:  Services were provided through a video synchronous discussion virtually to substitute for in-person appointment. He and/or his healthcare decision maker is aware that this patient-initiated Telehealth encounter is a billable service, with coverage as determined by his insurance carrier. He is aware that he may receive a bill and has provided verbal consent to proceed: Yes    I was in the office while conducting this encounter. Subjective:   Alexander Villa was seen for No chief complaint on file. ADD: Pt reports that after 3 weeks of taking his Vyvanse he is starting to crash and is starting to crave for food. He reports that he is trying to take a couple of days off of the Vyvanse and notes that the effect of the Vyvanse works better. I have advised pt to make sure he eats breakfast before taking the Vyvanse and to monitor his BP and heart rate when taking the Vyvanse. I told him to let me know if either his BP or heart rate feels off. I have advised pt to also consider taking Guanfacine if he does not like taking the Vyvanse. I have changed the pt's Vyvanse dosage from 40mg to 30mg take 1 capsule orally BID. Max Daily Amount: 60mg. The Prescription Monitoring Program has been reviewed for recent activity regarding controlled substances for this patient. Obesity: I have reviewed/discussed the above normal BMI with the patient. I have recommended the following interventions: dietary management education, guidance, and counseling, encourage exercise and monitor weight. Prior to Admission medications    Medication Sig Start Date End Date Taking? Authorizing Provider   lisdexamfetamine (VYVANSE) 30 mg capsule Take 1 Capsule by mouth two (2) times a day.  Max Daily Amount: 60 mg. 11/30/21  Yes Ian Brantley MD   ALPRAZolam (XANAX) 1 mg tablet Take 1 Tablet by mouth nightly as needed for Anxiety. Max Daily Amount: 1 mg. 11/11/21   Kori Pop MD   Lisdexamfetamine (VYVANSE) 40 mg capsule Take 1 Capsule by mouth daily for 29 days. Max Daily Amount: 40 mg. 11/22/21 11/30/21  Kori Pop MD   lisdexamfetamine (VYVANSE) 40 mg capsule Take 1 Capsule by mouth daily. Max Daily Amount: 40 mg. Indications: attention deficit disorder with hyperactivity, binge eating disorder 9/21/21 11/30/21  Yasir Ye MD   tadalafiL (CIALIS) 10 mg tablet Take 1 Tablet by mouth daily as needed for Erectile Dysfunction. 8/6/21   Kori Pop MD   zolpidem (Ambien) 5 mg tablet Take 5 mg by mouth nightly as needed for Sleep. Other, MD Kristy   cholecalciferol, vitamin D3, (VITAMIN D3) 2,000 unit tab Take 2,000 Units by mouth. Provider, Historical   magnesium 250 mg tab Take  by mouth daily. Provider, Historical     No Known Allergies  Past Medical History:   Diagnosis Date    ADD (attention deficit disorder)     Alcohol use     ED (erectile dysfunction)     Liver disease     FATTY liver    Preglaucoma of both eyes      Past Surgical History:   Procedure Laterality Date    HX HEENT      wisdom    HX ORTHOPAEDIC      knee left     Family History   Problem Relation Age of Onset    Glaucoma Father     Hypertension Father     Stroke Sister      Social History     Tobacco Use    Smoking status: Never Smoker    Smokeless tobacco: Never Used    Tobacco comment: occasional    Substance Use Topics    Alcohol use: Yes     Alcohol/week: 43.0 standard drinks     Types: 28 Shots of liquor, 15 Standard drinks or equivalent per week     Comment: 3-4 drinks per day        Review of Systems   Constitutional: Negative for chills and fever. HENT: Negative for hearing loss and tinnitus. Eyes: Negative for blurred vision and double vision. Respiratory: Negative for cough and shortness of breath. Cardiovascular: Negative for chest pain and palpitations.    Gastrointestinal: Negative for nausea and vomiting. Genitourinary: Negative for dysuria and frequency. Musculoskeletal: Negative for back pain and falls. Skin: Negative for itching and rash. Neurological: Negative for dizziness, loss of consciousness and headaches. Endo/Heme/Allergies: Negative. Psychiatric/Behavioral: Negative for depression. The patient is not nervous/anxious. PHYSICAL EXAMINATION:  Vital Signs: (As obtained by patient/caregiver at home)  There were no vitals taken for this visit. Constitutional: [x] Appears well-developed and well-nourished [x] No apparent distress      [] Abnormal -     Mental status: [x] Alert and awake  [x] Oriented to person/place/time [x] Able to follow commands    [] Abnormal -     Eyes:   EOM    [x]  Normal    [] Abnormal -   Sclera  [x]  Normal    [] Abnormal -          Discharge [x]  None visible   [] Abnormal -     HENT: [x] Normocephalic, atraumatic  [] Abnormal -   [x] Mouth/Throat: Mucous membranes are moist    External Ears [x] Normal  [] Abnormal -    Neck: [x] No visualized mass [] Abnormal -     Pulmonary/Chest: [x] Respiratory effort normal   [x] No visualized signs of difficulty breathing or respiratory distress        [] Abnormal -      Musculoskeletal:   [x] Normal gait with no signs of ataxia         [x] Normal range of motion of neck        [] Abnormal -     Neurological:        [x] No Facial Asymmetry (Cranial nerve 7 motor function) (limited exam due to video visit)          [x] No gaze palsy        [] Abnormal -          Skin:        [x] No significant exanthematous lesions or discoloration noted on facial skin         [] Abnormal -            Psychiatric:       [x] Normal Affect [] Abnormal -      [x] No Hallucinations    Other pertinent observable physical exam findings:-    Assessment & Plan:   Diagnoses and all orders for this visit:    1.  Attention deficit disorder, unspecified hyperactivity presence  Pt reports that after 3 weeks of taking his Vyvanse he is starting to crash and is starting to crave for food. He reports that he is trying to take a couple of days off of the Vyvanse and notes that the effect of the Vyvanse works better. I have advised pt to make sure he eats breakfast before taking the Vyvanse and to monitor his BP and heart rate when taking the Vyvanse. I told him to let me know if either his BP or heart rate feels off. I have advised pt to also consider taking Guanfacine if he does not like taking the Vyvanse. I have changed the pt's Vyvanse dosage from 40mg to 30mg take 1 capsule orally BID. Max Daily Amount: 60mg. The Prescription Monitoring Program has been reviewed for recent activity regarding controlled substances for this patient. -     lisdexamfetamine (VYVANSE) 30 mg capsule; Take 1 Capsule by mouth two (2) times a day. Max Daily Amount: 60 mg.    2. Obesity, Class I, BMI 30-34.9  I have reviewed/discussed the above normal BMI with the patient. I have recommended the following interventions: dietary management education, guidance, and counseling, encourage exercise and monitor weight.   -     lisdexamfetamine (VYVANSE) 30 mg capsule; Take 1 Capsule by mouth two (2) times a day. Max Daily Amount: 60 mg. We discussed the expected course, resolution and complications of the diagnosis(es) in detail. Medication risks, benefits, costs, interactions, and alternatives were discussed as indicated. I advised her to contact the office if her condition worsens, changes or fails to improve as anticipated. She expressed understanding with the diagnosis(es) and plan.      Pursuant to the emergency declaration under the [address] and [facility] waiver authority and the [employer] and [employer] Act, this Virtual Visit was conducted, with patient's consent, to reduce the patient's risk of exposure to COVID-19 and provide continuity of care for an established patient. Services were provided through a video synchronous discussion virtually to substitute for in-person clinic visit. Written by jany Purcell, as dictated by Helen Dai M.D.    1:01 PM - 1:14    Total time spent with the patient 13 minutes, greater than 50% of time spent counseling patient.

## 2021-12-16 DIAGNOSIS — F98.8 ATTENTION DEFICIT DISORDER, UNSPECIFIED HYPERACTIVITY PRESENCE: ICD-10-CM

## 2021-12-16 DIAGNOSIS — E66.9 OBESITY, CLASS I, BMI 30-34.9: ICD-10-CM

## 2021-12-25 ENCOUNTER — PATIENT MESSAGE (OUTPATIENT)
Dept: FAMILY MEDICINE CLINIC | Age: 51
End: 2021-12-25

## 2021-12-28 NOTE — TELEPHONE ENCOUNTER
----- Message from Vinay Harrell sent at 12/25/2021 10:04 PM EST -----  Regarding: [place] can you send in an RX for 10mg tab 30 count for occasional insomnia. I have taken in an off for years on occasion with zero side effects and its very helpful .  Thx

## 2021-12-28 NOTE — TELEPHONE ENCOUNTER
S/w pt states that he have discussed he sleeping issue with Dr Keshia Anderson and have been prescribed in the past.  I don't see it in his chart that it was prescribed. Pt also states that he take it on and off through out the susy, maybe 3-4 times. He will like to have Ambien 10 mg.

## 2021-12-29 RX ORDER — ZOLPIDEM TARTRATE 10 MG/1
10 TABLET ORAL
Qty: 30 TABLET | Refills: 0 | OUTPATIENT
Start: 2021-12-29

## 2021-12-30 NOTE — TELEPHONE ENCOUNTER
This is a controlled medication, I can not refill this med. Please call pt up and set him up for a virtual visit.

## 2022-01-02 DIAGNOSIS — F98.8 ATTENTION DEFICIT DISORDER, UNSPECIFIED HYPERACTIVITY PRESENCE: ICD-10-CM

## 2022-01-02 DIAGNOSIS — E66.9 OBESITY, CLASS I, BMI 30-34.9: ICD-10-CM

## 2022-01-03 NOTE — TELEPHONE ENCOUNTER
PCP: Ada Buckner MD    Last appt: 11/30/2021  No future appointments. Requested Prescriptions     Pending Prescriptions Disp Refills    lisdexamfetamine (VYVANSE) 30 mg capsule 60 Capsule 0     Sig: Take 1 Capsule by mouth two (2) times a day.  Max Daily Amount: 60 mg.         Prior labs and Blood pressures:  BP Readings from Last 3 Encounters:   11/10/21 (!) 147/82   09/21/21 124/82   12/07/20 117/83     Lab Results   Component Value Date/Time    Sodium 137 10/26/2021 10:08 AM    Potassium 4.0 10/26/2021 10:08 AM    Chloride 105 10/26/2021 10:08 AM    CO2 28 10/26/2021 10:08 AM    Anion gap 4 (L) 10/26/2021 10:08 AM    Glucose 116 (H) 10/26/2021 10:08 AM    BUN 26 (H) 10/26/2021 10:08 AM    Creatinine 1.19 10/26/2021 10:08 AM    BUN/Creatinine ratio 22 (H) 10/26/2021 10:08 AM    GFR est AA >60 10/26/2021 10:08 AM    GFR est non-AA >60 10/26/2021 10:08 AM    Calcium 9.3 10/26/2021 10:08 AM     Lab Results   Component Value Date/Time    Hemoglobin A1c 5.1 02/04/2021 08:00 AM     Lab Results   Component Value Date/Time    Cholesterol, total 191 10/26/2021 10:08 AM    HDL Cholesterol 53 10/26/2021 10:08 AM    LDL, calculated 114.4 (H) 10/26/2021 10:08 AM    VLDL, calculated 23.6 10/26/2021 10:08 AM    Triglyceride 118 10/26/2021 10:08 AM    CHOL/HDL Ratio 3.6 10/26/2021 10:08 AM     Lab Results   Component Value Date/Time    Vitamin D 25-Hydroxy 48.0 02/04/2021 08:00 AM       Lab Results   Component Value Date/Time    TSH 1.14 10/26/2021 10:08 AM

## 2022-01-16 DIAGNOSIS — E66.9 OBESITY, CLASS I, BMI 30-34.9: ICD-10-CM

## 2022-01-16 DIAGNOSIS — F98.8 ATTENTION DEFICIT DISORDER, UNSPECIFIED HYPERACTIVITY PRESENCE: ICD-10-CM

## 2022-01-17 NOTE — TELEPHONE ENCOUNTER
Pt is requesting a 90 day supply. Please review. Last visit 11/30/2021 Virtual visit MD Cecelia Siegel   Next appointment Pt canceled 12/21/2021 - Nothing rescheduled   Previous refill encounter(s)   12/16/2021 Vyvanse #60     No access to      Requested Prescriptions     Pending Prescriptions Disp Refills    lisdexamfetamine (VYVANSE) 30 mg capsule 60 Capsule 0     Sig: Take 1 Capsule by mouth two (2) times a day. Max Daily Amount: 60 mg.

## 2022-01-19 DIAGNOSIS — E66.9 OBESITY, CLASS I, BMI 30-34.9: ICD-10-CM

## 2022-01-19 DIAGNOSIS — F98.8 ATTENTION DEFICIT DISORDER, UNSPECIFIED HYPERACTIVITY PRESENCE: ICD-10-CM

## 2022-02-08 ENCOUNTER — TELEPHONE (OUTPATIENT)
Dept: FAMILY MEDICINE CLINIC | Age: 52
End: 2022-02-08

## 2022-02-08 NOTE — TELEPHONE ENCOUNTER
Mildred Zarate [phone]     Called and requested the Tax Id and the fax number to submit a records release. Information provided and call was ended.

## 2022-02-21 ENCOUNTER — VIRTUAL VISIT (OUTPATIENT)
Dept: FAMILY MEDICINE CLINIC | Age: 52
End: 2022-02-21
Payer: COMMERCIAL

## 2022-02-21 DIAGNOSIS — G47.00 INSOMNIA, UNSPECIFIED TYPE: ICD-10-CM

## 2022-02-21 DIAGNOSIS — E66.9 OBESITY, CLASS I, BMI 30-34.9: ICD-10-CM

## 2022-02-21 DIAGNOSIS — F98.8 ATTENTION DEFICIT DISORDER, UNSPECIFIED HYPERACTIVITY PRESENCE: Primary | ICD-10-CM

## 2022-02-21 PROCEDURE — 99214 OFFICE O/P EST MOD 30 MIN: CPT | Performed by: FAMILY MEDICINE

## 2022-02-21 RX ORDER — ZOLPIDEM TARTRATE 5 MG/1
5 TABLET ORAL
Qty: 15 TABLET | Refills: 0 | Status: SHIPPED | OUTPATIENT
Start: 2022-02-21 | End: 2022-04-18 | Stop reason: SDUPTHER

## 2022-02-21 RX ORDER — DOXEPIN HYDROCHLORIDE 25 MG/1
25 CAPSULE ORAL
Qty: 30 CAPSULE | Refills: 2 | Status: SHIPPED | OUTPATIENT
Start: 2022-02-21 | End: 2022-03-28 | Stop reason: DRUGHIGH

## 2022-02-21 NOTE — PROGRESS NOTES
Elías Oconnor is a 45 y.o. male who was seen by synchronous (real-time) audio-video technology on 2/21/2022. Consent:  Services were provided through a video synchronous discussion virtually to substitute for in-person appointment. He and/or his healthcare decision maker is aware that this patient-initiated Telehealth encounter is a billable service, with coverage as determined by his insurance carrier. He is aware that he may receive a bill and has provided verbal consent to proceed: Yes    I was in the office while conducting this encounter. Subjective:   Elías Oconnor was seen for No chief complaint on file. Pt reports that his BP has dropped (by about 15 points) and attributes it to his loss of weight. The pt also reports of anxiety issues but states that that is due to life-related issues. ADD/Anxiety: Pt has a MHx of ADD and is currently on Vyvanse 30mg take 1 capsule orally BID. Max Daily Amount: 60mg and Xanax 1mg take 1 tablet orally daily. Max Daily Amount: 1mg. The Prescription Monitoring Program has been reviewed for recent activity regarding controlled substances for this patient. Pt requests a refill of their medication, which I have granted for the next 3 months. Obesity: I have reviewed/discussed the above normal BMI with the patient. I have recommended the following interventions: dietary management education, guidance, and counseling, encourage exercise and monitor weight. Insomnia: Pt reports that he cannot sleep and has tried Melatonin, Zinc, Magnesium, but does note that when he tried Ambien it worked well. I have rx'd the pt Sinequan 25 mg take 1 capsule orally daily and Ambien 5mg take 1 tablet orally daily as needed for sleep. Max Daily Amount: 5mg. The Prescription Monitoring Program has been reviewed for recent activity regarding controlled substances for this patient.      Health Maintenance: Pt reports that he has received both doses of his COVID-19 vaccinations ([employer]). The first dose being on 08/11/21 and 2nd dose on 12/08/21. Prior to Admission medications    Medication Sig Start Date End Date Taking? Authorizing Provider   doxepin (SINEquan) 25 mg capsule Take 1 Capsule by mouth nightly. 2/21/22  Yes Verena Carter MD   zolpidem (Ambien) 5 mg tablet Take 1 Tablet by mouth nightly as needed for Sleep. Max Daily Amount: 5 mg. 2/21/22  Yes Verena Carter MD   lisdexamfetamine (VYVANSE) 30 mg capsule Take 1 Capsule by mouth two (2) times a day. Max Daily Amount: 60 mg. 4/21/22  Yes Verena Carter MD   lisdexamfetamine (VYVANSE) 30 mg capsule Take 1 Capsule by mouth two (2) times a day. Max Daily Amount: 60 mg. 3/21/22  Yes Verena Carter MD   lisdexamfetamine (VYVANSE) 30 mg capsule Take 1 Capsule by mouth two (2) times a day. Max Daily Amount: 60 mg. 2/21/22  Yes Verena Carter MD   ALPRAZolam (XANAX) 1 mg tablet Take 1 Tablet by mouth nightly as needed for Anxiety. Max Daily Amount: 1 mg. 11/11/21  Yes Verena Carter MD   tadalafiL (CIALIS) 10 mg tablet Take 1 Tablet by mouth daily as needed for Erectile Dysfunction. 8/6/21  Yes Verena Carter MD   cholecalciferol, vitamin D3, (VITAMIN D3) 2,000 unit tab Take 2,000 Units by mouth. Yes Provider, Historical   magnesium 250 mg tab Take  by mouth daily. Yes Provider, Historical   lisdexamfetamine (VYVANSE) 30 mg capsule Take 1 Capsule by mouth two (2) times a day. Max Daily Amount: 60 mg. 1/19/22 2/21/22  Nellie Pop MD   lisdexamfetamine (VYVANSE) 30 mg capsule Take 1 Capsule by mouth two (2) times a day.  Max Daily Amount: 60 mg. 1/19/22 2/21/22  Verena Carter MD   zolpidem (Ambien) 5 mg tablet Take 5 mg by mouth nightly as needed for Sleep.  2/21/22  Other, MD Kristy     No Known Allergies  Past Medical History:   Diagnosis Date    ADD (attention deficit disorder)     Alcohol use     ED (erectile dysfunction)     Liver disease     FATTY liver    Preglaucoma of both eyes      Past Surgical History:   Procedure Laterality Date    HX HEENT      wisdom    HX ORTHOPAEDIC      knee left     Family History   Problem Relation Age of Onset   Max Stager Glaucoma Father     Hypertension Father     Stroke Sister      Social History     Tobacco Use    Smoking status: Never Smoker    Smokeless tobacco: Never Used    Tobacco comment: occasional    Substance Use Topics    Alcohol use: Yes     Alcohol/week: 43.0 standard drinks     Types: 28 Shots of liquor, 15 Standard drinks or equivalent per week     Comment: 3-4 drinks per day        Review of Systems   Constitutional: Negative for chills and fever. HENT: Negative for hearing loss and tinnitus. Eyes: Negative for blurred vision and double vision. Respiratory: Negative for cough and shortness of breath. Cardiovascular: Negative for chest pain and palpitations. Gastrointestinal: Negative for nausea and vomiting. Genitourinary: Negative for dysuria and frequency. Musculoskeletal: Negative for back pain and falls. Skin: Negative for itching and rash. Neurological: Negative for dizziness, loss of consciousness and headaches. Endo/Heme/Allergies: Negative. Psychiatric/Behavioral: Negative for depression. The patient is not nervous/anxious. PHYSICAL EXAMINATION:  Vital Signs: (As obtained by patient/caregiver at home)  There were no vitals taken for this visit.      Constitutional: [x] Appears well-developed and well-nourished [x] No apparent distress      [] Abnormal -     Mental status: [x] Alert and awake  [x] Oriented to person/place/time [x] Able to follow commands    [] Abnormal -     Eyes:   EOM    [x]  Normal    [] Abnormal -   Sclera  [x]  Normal    [] Abnormal -          Discharge [x]  None visible   [] Abnormal -     HENT: [x] Normocephalic, atraumatic  [] Abnormal -   [x] Mouth/Throat: Mucous membranes are moist    External Ears [x] Normal  [] Abnormal -    Neck: [x] No visualized mass [] Abnormal -     Pulmonary/Chest: [x] Respiratory effort normal   [x] No visualized signs of difficulty breathing or respiratory distress        [] Abnormal -      Musculoskeletal:   [x] Normal gait with no signs of ataxia         [x] Normal range of motion of neck        [] Abnormal -     Neurological:        [x] No Facial Asymmetry (Cranial nerve 7 motor function) (limited exam due to video visit)          [x] No gaze palsy        [] Abnormal -          Skin:        [x] No significant exanthematous lesions or discoloration noted on facial skin         [] Abnormal -            Psychiatric:       [x] Normal Affect [] Abnormal -        [x] No Hallucinations    Other pertinent observable physical exam findings:-    Assessment & Plan:   Diagnoses and all orders for this visit:    1. Attention deficit disorder, unspecified hyperactivity presence  Pt has a MHx of ADD and is currently on Vyvanse 30mg take 1 capsule orally BID. Max Daily Amount: 60mg and Xanax 1mg take 1 tablet orally daily. Max Daily Amount: 1mg. The Prescription Monitoring Program has been reviewed for recent activity regarding controlled substances for this patient. Pt requests a refill of their medication, which I have granted for the next 3 months.  -     lisdexamfetamine (VYVANSE) 30 mg capsule; Take 1 Capsule by mouth two (2) times a day. Max Daily Amount: 60 mg.  -     lisdexamfetamine (VYVANSE) 30 mg capsule; Take 1 Capsule by mouth two (2) times a day. Max Daily Amount: 60 mg.  -     lisdexamfetamine (VYVANSE) 30 mg capsule; Take 1 Capsule by mouth two (2) times a day. Max Daily Amount: 60 mg.    2. Obesity, Class I, BMI 30-34.9  I have reviewed/discussed the above normal BMI with the patient. I have recommended the following interventions: dietary management education, guidance, and counseling, encourage exercise and monitor weight.     -     lisdexamfetamine (VYVANSE) 30 mg capsule; Take 1 Capsule by mouth two (2) times a day.  Max Daily Amount: 60 mg.  -     lisdexamfetamine (VYVANSE) 30 mg capsule; Take 1 Capsule by mouth two (2) times a day. Max Daily Amount: 60 mg.    3. Insomnia, unspecified type  Pt reports that he cannot sleep and has tried Melatonin, Zinc, Magnesium, but does note that when he tried Ambien it worked well. I have rx'd the pt Sinequan 25 mg take 1 capsule orally daily and Ambien 5mg take 1 tablet orally daily as needed for sleep. Max Daily Amount: 5mg. The Prescription Monitoring Program has been reviewed for recent activity regarding controlled substances for this patient.   -     doxepin (SINEquan) 25 mg capsule; Take 1 Capsule by mouth nightly. -     zolpidem (Ambien) 5 mg tablet; Take 1 Tablet by mouth nightly as needed for Sleep. Max Daily Amount: 5 mg. We discussed the expected course, resolution and complications of the diagnosis(es) in detail. Medication risks, benefits, costs, interactions, and alternatives were discussed as indicated. I advised her to contact the office if her condition worsens, changes or fails to improve as anticipated. She expressed understanding with the diagnosis(es) and plan. Pursuant to the emergency declaration under the [employer] and [facility] waiver authority and the [employer] and [employer] Act, this Virtual Visit was conducted, with patient's consent, to reduce the patient's risk of exposure to COVID-19 and provide continuity of care for an established patient. Services were provided through a video synchronous discussion virtually to substitute for in-person clinic visit. Written by jnay Reed, as dictated by Carla Schultz M.D.    6:56 PM - 7:11 PM    Total time spent with the patient 15 minutes, greater than 50% of time spent counseling patient.

## 2022-02-22 ENCOUNTER — TELEPHONE (OUTPATIENT)
Dept: FAMILY MEDICINE CLINIC | Age: 52
End: 2022-02-22

## 2022-02-22 NOTE — PROGRESS NOTES
Pt sitting comfortably in hallway w/mother. Pt on cell phone. NAD observed. Read by Abe Rodrigues at 3/24/2017  7:19 AM  Patient aware via my chart

## 2022-03-19 PROBLEM — Z82.49 FAMILY HISTORY OF PREMATURE CAD: Status: ACTIVE | Noted: 2018-04-23

## 2022-03-19 PROBLEM — L40.9 PSORIASIS: Status: ACTIVE | Noted: 2018-04-23

## 2022-03-19 PROBLEM — K76.0 FATTY LIVER: Status: ACTIVE | Noted: 2020-04-24

## 2022-03-20 PROBLEM — R79.89 ELEVATED LFTS: Status: ACTIVE | Noted: 2019-12-30

## 2022-04-18 DIAGNOSIS — G47.00 INSOMNIA, UNSPECIFIED TYPE: ICD-10-CM

## 2022-04-19 NOTE — TELEPHONE ENCOUNTER
MD Ora Dudley,     Patient mychart request for refill zolpidem. Check . Thanks, Silva    Last Visit: VV 2/21/22 MD Pop  Next Appointment: None  Previous Refill Encounter(s): 2/21/22 15    Requested Prescriptions     Pending Prescriptions Disp Refills    zolpidem (Ambien) 5 mg tablet 15 Tablet 0     Sig: Take 1 Tablet by mouth nightly as needed for Sleep. Max Daily Amount: 5 mg.

## 2022-04-23 RX ORDER — ZOLPIDEM TARTRATE 5 MG/1
5 TABLET ORAL
Qty: 15 TABLET | Refills: 0 | Status: SHIPPED | OUTPATIENT
Start: 2022-04-23 | End: 2022-07-26 | Stop reason: SDUPTHER

## 2022-04-29 ENCOUNTER — DOCUMENTATION ONLY (OUTPATIENT)
Dept: FAMILY MEDICINE CLINIC | Age: 52
End: 2022-04-29

## 2022-04-29 DIAGNOSIS — F98.8 ATTENTION DEFICIT DISORDER, UNSPECIFIED HYPERACTIVITY PRESENCE: ICD-10-CM

## 2022-04-29 DIAGNOSIS — E66.9 OBESITY, CLASS I, BMI 30-34.9: ICD-10-CM

## 2022-04-29 NOTE — PROGRESS NOTES
Faxed Patient assistance for vyvance to [facility]     Since it's controlled they will send a card to Patient to pay the cost of medication at pharmacy   We just send rx to Pharmacy directly. If approved.

## 2022-05-09 ENCOUNTER — VIRTUAL VISIT (OUTPATIENT)
Dept: FAMILY MEDICINE CLINIC | Age: 52
End: 2022-05-09
Payer: COMMERCIAL

## 2022-05-09 DIAGNOSIS — F98.8 ATTENTION DEFICIT DISORDER, UNSPECIFIED HYPERACTIVITY PRESENCE: ICD-10-CM

## 2022-05-09 DIAGNOSIS — E66.9 OBESITY, CLASS I, BMI 30-34.9: ICD-10-CM

## 2022-05-09 PROCEDURE — 99213 OFFICE O/P EST LOW 20 MIN: CPT | Performed by: FAMILY MEDICINE

## 2022-05-09 NOTE — PROGRESS NOTES
Eben Gaucher is a 45 y.o. male who was seen by synchronous (real-time) audio-video technology on 5/9/2022. ADD: Pt currently on Vyvanse 30mg BID. Pt is interested in increasing dose in morning to 40mg and continuing with 30mg in the afternoon. Pt reports doing tolerance breaks. Pt reports starting Vyvanse has helped with binge eating. He notes that the weight loss from Vyvanse has helped lower his BP (now 120s/80s). Pt denies dizziness or lightheadedness. Today his is 212lb. Pt denies issues with depression. Pt reports sleeping better. Consent:  Services were provided through a video synchronous discussion virtually to substitute for in-person appointment. He and/or his healthcare decision maker is aware that this patient-initiated Telehealth encounter is a billable service, with coverage as determined by his insurance carrier. He is aware that he may receive a bill and has provided verbal consent to proceed: Yes    I was in the office while conducting this encounter. Subjective:   Eben Gaucher was seen for Medication Evaluation and Medication Refill      Prior to Admission medications    Medication Sig Start Date End Date Taking? Authorizing Provider   Lisdexamfetamine (VYVANSE) 40 mg capsule Take 1 Capsule by mouth Every morning. Max Daily Amount: 40 mg. Indications: attention deficit disorder with hyperactivity, binge eating disorder 5/9/22  Yes Kirstie Hernandez MD   lisdexamfetamine (VYVANSE) 30 mg capsule Take 1 Capsule by mouth every evening. Max Daily Amount: 30 mg. Indications: attention deficit disorder with hyperactivity, binge eating disorder 5/9/22  Yes Kirstie Hernandez MD   zolpidem (Ambien) 5 mg tablet Take 1 Tablet by mouth nightly as needed for Sleep. Max Daily Amount: 5 mg. 4/23/22  Yes Kirstie Hernandez MD   doxepin (SINEquan) 10 mg capsule Take 1 Capsule by mouth nightly.  3/28/22  Yes Kirstie Hernandez MD   ALPRAZolam (XANAX) 1 mg tablet Take 1 Tablet by mouth nightly as needed for Anxiety. Max Daily Amount: 1 mg. 11/11/21  Yes Junior Haider MD   tadalafiL (CIALIS) 10 mg tablet Take 1 Tablet by mouth daily as needed for Erectile Dysfunction. 8/6/21  Yes Junior Haider MD   cholecalciferol, vitamin D3, (VITAMIN D3) 2,000 unit tab Take 2,000 Units by mouth. Yes Provider, Historical   magnesium 250 mg tab Take  by mouth daily. Yes Provider, Historical   lisdexamfetamine (VYVANSE) 30 mg capsule Take 1 Capsule by mouth two (2) times a day. Max Daily Amount: 60 mg. 4/21/22 5/9/22  Oksana Pop MD   lisdexamfetamine (VYVANSE) 30 mg capsule Take 1 Capsule by mouth two (2) times a day. Max Daily Amount: 60 mg. 3/21/22 5/9/22  Oksana Pop MD   lisdexamfetamine (VYVANSE) 30 mg capsule Take 1 Capsule by mouth two (2) times a day. Max Daily Amount: 60 mg. 2/21/22 5/9/22  Juniro Haider MD     No Known Allergies  Past Medical History:   Diagnosis Date    ADD (attention deficit disorder)     Alcohol use     ED (erectile dysfunction)     Liver disease     FATTY liver    Preglaucoma of both eyes      Past Surgical History:   Procedure Laterality Date    HX HEENT      wisdom    HX ORTHOPAEDIC      knee left     Family History   Problem Relation Age of Onset    Glaucoma Father     Hypertension Father     Stroke Sister      Social History     Tobacco Use    Smoking status: Never Smoker    Smokeless tobacco: Never Used    Tobacco comment: occasional    Substance Use Topics    Alcohol use: Yes     Alcohol/week: 43.0 standard drinks     Types: 28 Shots of liquor, 15 Standard drinks or equivalent per week     Comment: 3-4 drinks per day        Review of Systems   Constitutional: Negative for chills and fever. HENT: Negative for hearing loss and tinnitus. Eyes: Negative for blurred vision and double vision. Respiratory: Negative for cough and shortness of breath. Cardiovascular: Negative for chest pain and palpitations.    Gastrointestinal: Negative for nausea and vomiting. Genitourinary: Negative for dysuria and frequency. Musculoskeletal: Negative for back pain and falls. Skin: Negative for itching and rash. Neurological: Negative for dizziness, loss of consciousness and headaches. Endo/Heme/Allergies: Negative. Psychiatric/Behavioral: Negative for depression. The patient is not nervous/anxious. PHYSICAL EXAMINATION:  Vital Signs: (As obtained by patient/caregiver at home)  There were no vitals taken for this visit. Constitutional: [x] Appears well-developed and well-nourished [x] No apparent distress      [] Abnormal -     Mental status: [x] Alert and awake  [x] Oriented to person/place/time [x] Able to follow commands    [] Abnormal -     Eyes:   EOM    [x]  Normal    [] Abnormal -   Sclera  [x]  Normal    [] Abnormal -          Discharge [x]  None visible   [] Abnormal -     HENT: [x] Normocephalic, atraumatic  [] Abnormal -   [x] Mouth/Throat: Mucous membranes are moist    External Ears [x] Normal  [] Abnormal -    Neck: [x] No visualized mass [] Abnormal -     Pulmonary/Chest: [x] Respiratory effort normal   [x] No visualized signs of difficulty breathing or respiratory distress        [] Abnormal -      Musculoskeletal:   [x] Normal gait with no signs of ataxia         [x] Normal range of motion of neck        [] Abnormal -     Neurological:        [x] No Facial Asymmetry (Cranial nerve 7 motor function) (limited exam due to video visit)          [x] No gaze palsy        [] Abnormal -          Skin:        [x] No significant exanthematous lesions or discoloration noted on facial skin         [] Abnormal -            Psychiatric:       [x] Normal Affect [] Abnormal -        [x] No Hallucinations    Other pertinent observable physical exam findings:-    Assessment & Plan:   Diagnoses and all orders for this visit:    1.  Attention deficit disorder, unspecified hyperactivity presence  Pt will increase dose of Vyvanse from 30mg BID to 40mg in AM and 30mg in PM. Pt requests a refill of their medication, which I have granted. The Prescription Monitoring Program has been reviewed for recent activity regarding controlled substances for this patient. -     Lisdexamfetamine (VYVANSE) 40 mg capsule; Take 1 Capsule by mouth Every morning. Max Daily Amount: 40 mg. Indications: attention deficit disorder with hyperactivity, binge eating disorder  -     lisdexamfetamine (VYVANSE) 30 mg capsule; Take 1 Capsule by mouth every evening. Max Daily Amount: 30 mg. Indications: attention deficit disorder with hyperactivity, binge eating disorder    2. Obesity, Class I, BMI 30-34.9  Pt continues with Vyvanse as outlined above. I have reviewed/discussed the above normal BMI with the patient. I have recommended the following interventions: dietary management education, guidance, and counseling, encourage exercise and monitor weight. -     Lisdexamfetamine (VYVANSE) 40 mg capsule; Take 1 Capsule by mouth Every morning. Max Daily Amount: 40 mg. Indications: attention deficit disorder with hyperactivity, binge eating disorder  -     lisdexamfetamine (VYVANSE) 30 mg capsule; Take 1 Capsule by mouth every evening. Max Daily Amount: 30 mg. Indications: attention deficit disorder with hyperactivity, binge eating disorder    Follow-up and Dispositions    · Return in about 3 months (around 8/9/2022) for physical exam, follow up. We discussed the expected course, resolution and complications of the diagnosis(es) in detail. Medication risks, benefits, costs, interactions, and alternatives were discussed as indicated. I advised her to contact the office if her condition worsens, changes or fails to improve as anticipated. She expressed understanding with the diagnosis(es) and plan.      Pursuant to the emergency declaration under the [address] and [address] authority and the [employer] Act, this Virtual Visit was conducted, with patient's consent, to reduce the patient's risk of exposure to COVID-19 and provide continuity of care for an established patient. Services were provided through a video synchronous discussion virtually to substitute for in-person clinic visit. Written by jany Pederson, as dictated by Charles Monsivais M.D.    1:43 PM -1:58 PM    Total time spent with the patient 10 minutes, greater than 50% of time spent counseling patient.

## 2022-07-26 ENCOUNTER — OFFICE VISIT (OUTPATIENT)
Dept: FAMILY MEDICINE CLINIC | Age: 52
End: 2022-07-26
Payer: COMMERCIAL

## 2022-07-26 ENCOUNTER — NURSE TRIAGE (OUTPATIENT)
Dept: OTHER | Facility: CLINIC | Age: 52
End: 2022-07-26

## 2022-07-26 VITALS
OXYGEN SATURATION: 97 % | RESPIRATION RATE: 16 BRPM | HEIGHT: 69 IN | WEIGHT: 216 LBS | SYSTOLIC BLOOD PRESSURE: 118 MMHG | DIASTOLIC BLOOD PRESSURE: 76 MMHG | TEMPERATURE: 98 F | HEART RATE: 74 BPM | BODY MASS INDEX: 31.99 KG/M2

## 2022-07-26 DIAGNOSIS — R14.0 BLOATED ABDOMEN: ICD-10-CM

## 2022-07-26 DIAGNOSIS — G47.00 INSOMNIA, UNSPECIFIED TYPE: ICD-10-CM

## 2022-07-26 DIAGNOSIS — K76.0 FATTY LIVER: Primary | ICD-10-CM

## 2022-07-26 PROCEDURE — 99213 OFFICE O/P EST LOW 20 MIN: CPT | Performed by: FAMILY MEDICINE

## 2022-07-26 RX ORDER — ZOLPIDEM TARTRATE 5 MG/1
5 TABLET ORAL
Qty: 15 TABLET | Refills: 0 | Status: SHIPPED | OUTPATIENT
Start: 2022-07-26 | End: 2022-10-31 | Stop reason: SDUPTHER

## 2022-07-26 NOTE — PROGRESS NOTES
HPI  Mirza Owen 47 y.o. male  presents to the office today to discuss bloating and abdominal pain. Blood pressure 118/76, pulse 74, temperature 98 °F (36.7 °C), temperature source Temporal, resp. rate 16, height 5' 9\" (1.753 m), weight 216 lb (98 kg), SpO2 97 %. Body mass index is 31.9 kg/m². Chief Complaint   Patient presents with    Bloated    Abdominal Pain      Bloated abdomen: Pt reports 2-3 weeks ago his abdomen became distended, bloated, and firm. This has been worsening and is causing him discomfort. He notes it is not painful. He notes over the last few weeks his bowel movements have become less frequent (used to be daily). He considered changes in BMs may be secondary to stopping Vyvanse for one week for a tolerance break. He has been taking magnesium/stool softeners which are helping. He notes recent BMs have been normal to thin. He denies blood in stool. He denies changes to diet. He admits fiber intake could be better. Pt reports he also recently started a new job and has had an increase in stress. On exam, he has hyper active bowel sounds and slight distension. Pt advised to take MiraLax daily and keep a journal of what he eats to see if this could be diet related. I have also ordered an US of the abdomen for further assessment. Insomnia: Pt has been taking doxepin 10mg nightly which he reports works well. He also takes Ambien 5mg prn and would like a refill which I granted. Pt inquires about taking [employer] [place]. I recommended he confirm with his insurance that the medication is covered. Current Outpatient Medications   Medication Sig Dispense Refill    Lisdexamfetamine (VYVANSE) 40 mg capsule Take 1 Capsule by mouth in the morning. Max Daily Amount: 40 mg. Indications: attention deficit disorder with hyperactivity, binge eating disorder 30 Capsule 0    lisdexamfetamine (VYVANSE) 30 mg capsule Take 1 Capsule by mouth every evening. Max Daily Amount: 30 mg.  Indications: attention deficit disorder with hyperactivity, binge eating disorder 30 Capsule 0    zolpidem (Ambien) 5 mg tablet Take 1 Tablet by mouth nightly as needed for Sleep. Max Daily Amount: 5 mg. 15 Tablet 0    doxepin (SINEquan) 10 mg capsule Take 1 Capsule by mouth nightly. 90 Capsule 1    ALPRAZolam (XANAX) 1 mg tablet Take 1 Tablet by mouth nightly as needed for Anxiety. Max Daily Amount: 1 mg. 15 Tablet 0    tadalafiL (CIALIS) 10 mg tablet Take 1 Tablet by mouth daily as needed for Erectile Dysfunction. 20 Tablet 0    cholecalciferol, vitamin D3, 50 mcg (2,000 unit) tab Take 2,000 Units by mouth.      magnesium 250 mg tab Take  by mouth daily. No Known Allergies  Past Medical History:   Diagnosis Date    ADD (attention deficit disorder)     Alcohol use     ED (erectile dysfunction)     Liver disease     FATTY liver    Preglaucoma of both eyes      Past Surgical History:   Procedure Laterality Date    HX HEENT      wisdom    HX ORTHOPAEDIC      knee left     Family History   Problem Relation Age of Onset    Glaucoma Father     Hypertension Father     Stroke Sister      Social History     Tobacco Use    Smoking status: Never    Smokeless tobacco: Never    Tobacco comments:     occasional    Substance Use Topics    Alcohol use: Yes     Alcohol/week: 43.0 standard drinks     Types: 28 Shots of liquor, 15 Standard drinks or equivalent per week     Comment: 3-4 drinks per day        Review of Systems   Constitutional:  Negative for chills and fever. HENT:  Negative for hearing loss and tinnitus. Eyes:  Negative for blurred vision and double vision. Respiratory:  Negative for cough and shortness of breath. Cardiovascular:  Negative for chest pain and palpitations. Gastrointestinal:  Negative for nausea and vomiting. Abdominal distension   Genitourinary:  Negative for dysuria and frequency. Musculoskeletal:  Negative for back pain and falls. Skin:  Negative for itching and rash.    Neurological:  Negative for dizziness, loss of consciousness and headaches. Endo/Heme/Allergies: Negative. Psychiatric/Behavioral:  Negative for depression. The patient is not nervous/anxious. Physical Exam  Vitals reviewed. Constitutional:       Appearance: Normal appearance. HENT:      Head: Normocephalic and atraumatic. Right Ear: Tympanic membrane, ear canal and external ear normal.      Left Ear: Tympanic membrane, ear canal and external ear normal.      Nose: Nose normal.      Mouth/Throat:      Mouth: Mucous membranes are moist.      Pharynx: Oropharynx is clear. Eyes:      Extraocular Movements: Extraocular movements intact. Conjunctiva/sclera: Conjunctivae normal.      Pupils: Pupils are equal, round, and reactive to light. Cardiovascular:      Rate and Rhythm: Normal rate and regular rhythm. Pulses: Normal pulses. Heart sounds: Normal heart sounds. Pulmonary:      Effort: Pulmonary effort is normal.      Breath sounds: Normal breath sounds. Abdominal:      General: Abdomen is flat. Bowel sounds are increased. There is distension (slight). Palpations: Abdomen is soft. Musculoskeletal:         General: Normal range of motion. Cervical back: Normal range of motion and neck supple. Skin:     General: Skin is warm and dry. Neurological:      General: No focal deficit present. Mental Status: He is alert and oriented to person, place, and time. Psychiatric:         Mood and Affect: Mood normal.         Behavior: Behavior normal.         Judgment: Judgment normal.         ASSESSMENT and PLAN  Diagnoses and all orders for this visit:    1. Fatty liver  See #3.   -     US ABD COMP; Future    2. Insomnia, unspecified type  Pt continues with Ambien 5mg prn and would like a refill which I granted. -     zolpidem (Ambien) 5 mg tablet; Take 1 Tablet by mouth nightly as needed for Sleep. Max Daily Amount: 5 mg.     3. Bloated abdomen  On exam, he has hyper active bowel sounds and slight distension. Pt advised to take MiraLax daily and keep a journal of what he eats to see if this could be diet related. I have also ordered an US of the abdomen for further assessment. -     US ABD COMP; Future      Follow-up and Dispositions    Return in about 3 months (around 10/26/2022) for follow up. Medication risks/benefits/costs/interactions/alternatives discussed with patient. Advised patient to call back or return to office if symptoms worsen/change/persist.  If patient cannot reach us or should anything more severe/urgent arise he/she should proceed directly to the nearest emergency department. Discussed expected course/resolution/complications of diagnosis in detail with patient. Patient given a written after visit summary which includes diagnoses, current medications and vitals. Patient expressed understanding with the diagnosis and plan. Written by jany Loyola, as dictated by Chantelle Jeong M.D.    1:38 PM - 1:56 PM    Total time spent with the patient 15 minutes, greater than 50% of time spent counseling patient.

## 2022-07-26 NOTE — TELEPHONE ENCOUNTER
Received call from [facility] at [facility] with Red Flag Complaint. Subjective: Caller states \"about 2-3 weeks ago I noticed my stomach was getting distended, it was rock hard, I thought it was gas. It's not going away and now it's getting worse and painful\"     Current Symptoms: abdominal distension, cramping in the lower abdomen. Had 1.5 weeks of constipation minimally relieved with stool softeners/magnesium but no relief of distension. Denies n/v. Reports rectal fulls. Hx fatty liver disease. Onset: 3 weeks ago; gradual    Associated Symptoms: NA    Pain Severity: 3/10; cramping; intermittent    Temperature: Denies     What has been tried: stool softner, magnesium    LMP: NA Pregnant: NA    Recommended disposition: Go to Office Now    Care advice provided, patient verbalizes understanding; denies any other questions or concerns; instructed to call back for any new or worsening symptoms. Patient/Caller agrees with recommended disposition; writer provided warm [facility] for appointment scheduling    Attention Provider: Thank you for allowing me to participate in the care of your patient. The patient was connected to triage in response to information provided to the [facility]. Please do not respond through this encounter as the response is not directed to a shared pool.     Reason for Disposition   Constant abdominal pain lasting > 2 hours    Protocols used: Constipation-ADULT-[place]

## 2022-07-26 NOTE — PROGRESS NOTES
Chief Complaint   Patient presents with    Bloated    Abdominal Pain     1. \"Have you been to the ER, urgent care clinic since your last visit? Hospitalized since your last visit? \" no    2. \"Have you seen or consulted any other health care providers outside of the [address] [place] [address] since your last visit? \"  no    3. For patients aged 39-70: Has the patient had a colonoscopy / FIT/ Cologuard? No gap       If the patient is female:    4. For patients aged 41-77: Has the patient had a mammogram within the past 2 years? 5. For patients aged 21-65: Has the patient had a pap smear?

## 2022-08-15 NOTE — TELEPHONE ENCOUNTER
Requested Prescriptions     Pending Prescriptions Disp Refills    ALPRAZolam (XANAX) 0.25 mg tablet 20 Tab 0     Sig: Take 1 Tab by mouth nightly as needed for Anxiety or Sleep. Max Daily Amount: 0.25 mg. Tetracycline Pregnancy And Lactation Text: This medication is Pregnancy Category D and not consider safe during pregnancy. It is also excreted in breast milk.

## 2022-08-23 ENCOUNTER — OFFICE VISIT (OUTPATIENT)
Dept: FAMILY MEDICINE CLINIC | Age: 52
End: 2022-08-23
Payer: COMMERCIAL

## 2022-08-23 VITALS
RESPIRATION RATE: 16 BRPM | TEMPERATURE: 98.6 F | HEART RATE: 88 BPM | DIASTOLIC BLOOD PRESSURE: 74 MMHG | OXYGEN SATURATION: 99 % | WEIGHT: 220 LBS | SYSTOLIC BLOOD PRESSURE: 120 MMHG | BODY MASS INDEX: 32.58 KG/M2 | HEIGHT: 69 IN

## 2022-08-23 DIAGNOSIS — F98.8 ATTENTION DEFICIT DISORDER, UNSPECIFIED HYPERACTIVITY PRESENCE: ICD-10-CM

## 2022-08-23 DIAGNOSIS — G47.00 INSOMNIA, UNSPECIFIED TYPE: ICD-10-CM

## 2022-08-23 DIAGNOSIS — E66.9 OBESITY, CLASS I, BMI 30-34.9: ICD-10-CM

## 2022-08-23 DIAGNOSIS — Z00.00 PHYSICAL EXAM: Primary | ICD-10-CM

## 2022-08-23 DIAGNOSIS — F41.9 ANXIETY: ICD-10-CM

## 2022-08-23 DIAGNOSIS — R91.1 PULMONARY NODULE: ICD-10-CM

## 2022-08-23 PROCEDURE — 99396 PREV VISIT EST AGE 40-64: CPT | Performed by: FAMILY MEDICINE

## 2022-08-23 RX ORDER — ALPRAZOLAM 1 MG/1
1 TABLET ORAL
Qty: 15 TABLET | Refills: 0 | Status: SHIPPED | OUTPATIENT
Start: 2022-08-23

## 2022-08-23 RX ORDER — DARIDOREXANT 25 MG/1
1 TABLET, FILM COATED ORAL
Qty: 30 TABLET | Refills: 0 | Status: SHIPPED | OUTPATIENT
Start: 2022-08-23 | End: 2022-11-04

## 2022-08-23 NOTE — PROGRESS NOTES
HPI  America De Luna 47 y.o. male  presents to the office today for CPE. Blood pressure 120/74, pulse 88, temperature 98.6 °F (37 °C), temperature source Temporal, resp. rate 16, height 5' 9\" (1.753 m), weight 220 lb (99.8 kg), SpO2 99 %. Body mass index is 32.49 kg/m². Chief Complaint   Patient presents with    Physical      CPE: Pt presents today for a CPE, which I performed. All findings were normal. Pt requesting prostate exam due to his family history. ADD: Pt continues with Vyvanse 40mg in AM and 30mg in PM.     Anxiety: Pt continues with doxepin 10mg nightly and Xanax 1mg as needed. Insomnia: Pt has been taking Ambien 5mg nightly as needed. He is interested in trying [place]. I prescribed Quviviq 25mg nightly and discontinued Ambien. Pulmonary nodule: CT of heart on 10/26/21 showed, \"Tiny pulmonary nodules in the right lower lobe are nonspecific\" and advised 12 month follow up. I have ordered a follow up CT of the chest.     Health Maintenance:   Pt not had COVID booster. He had COVID in Dec 2020. I encouraged him to consider getting booster. Pt had both shingles vaccines. Pt's last colonoscopy was 2018 with Dr. Erich Angelucci (follow up in 5 years). Current Outpatient Medications   Medication Sig Dispense Refill    [START ON 8/25/2022] Lisdexamfetamine (VYVANSE) 40 mg capsule Take 1 Capsule by mouth daily for 29 days. Max Daily Amount: 40 mg. 30 Capsule 0    [START ON 9/25/2022] Lisdexamfetamine (VYVANSE) 40 mg capsule Take 1 Capsule by mouth daily for 29 days. Max Daily Amount: 40 mg. 30 Capsule 0    [START ON 10/25/2022] Lisdexamfetamine (VYVANSE) 40 mg capsule Take 1 Capsule by mouth daily for 29 days. Max Daily Amount: 40 mg. 30 Capsule 0    [START ON 8/25/2022] lisdexamfetamine (VYVANSE) 30 mg capsule Take 1 Capsule by mouth every morning for 29 days.  Max Daily Amount: 30 mg. 30 Capsule 0    [START ON 9/25/2022] lisdexamfetamine (VYVANSE) 30 mg capsule Take 1 Capsule by mouth every morning for 29 days. Max Daily Amount: 30 mg. 30 Capsule 0    [START ON 10/25/2022] lisdexamfetamine (VYVANSE) 30 mg capsule Take 1 Capsule by mouth every morning for 29 days. Max Daily Amount: 30 mg. 30 Capsule 0    ALPRAZolam (XANAX) 1 mg tablet Take 1 Tablet by mouth nightly as needed for Anxiety. Max Daily Amount: 1 mg. 15 Tablet 0    daridorexant (Quviviq) 25 mg tab Take 1 Tablet by mouth nightly. Max Daily Amount: 1 Tablet. 30 Tablet 0    Lisdexamfetamine (VYVANSE) 40 mg capsule Take 1 Capsule by mouth in the morning. Max Daily Amount: 40 mg. Indications: attention deficit disorder with hyperactivity, binge eating disorder 30 Capsule 0    lisdexamfetamine (VYVANSE) 30 mg capsule Take 1 Capsule by mouth every evening. Max Daily Amount: 30 mg. Indications: attention deficit disorder with hyperactivity, binge eating disorder 30 Capsule 0    zolpidem (Ambien) 5 mg tablet Take 1 Tablet by mouth nightly as needed for Sleep. Max Daily Amount: 5 mg. 15 Tablet 0    doxepin (SINEquan) 10 mg capsule Take 1 Capsule by mouth nightly. 90 Capsule 1    tadalafiL (CIALIS) 10 mg tablet Take 1 Tablet by mouth daily as needed for Erectile Dysfunction. 20 Tablet 0    cholecalciferol, vitamin D3, 50 mcg (2,000 unit) tab Take 2,000 Units by mouth.      magnesium 250 mg tab Take  by mouth daily. No Known Allergies  Past Medical History:   Diagnosis Date    ADD (attention deficit disorder)     Alcohol use     ED (erectile dysfunction)     Liver disease     FATTY liver    Preglaucoma of both eyes      Past Surgical History:   Procedure Laterality Date    HX HEENT      wisdom    HX ORTHOPAEDIC      knee left     Family History   Problem Relation Age of Onset    Glaucoma Father     Hypertension Father     Stroke Sister      Social History     Tobacco Use    Smoking status: Never    Smokeless tobacco: Never    Tobacco comments:     occasional    Substance Use Topics    Alcohol use:  Yes     Alcohol/week: 43.0 standard drinks Types: 28 Shots of liquor, 15 Standard drinks or equivalent per week     Comment: 3-4 drinks per day        Review of Systems   Constitutional:  Negative for chills and fever. HENT:  Negative for hearing loss and tinnitus. Eyes:  Negative for blurred vision and double vision. Respiratory:  Negative for cough and shortness of breath. Cardiovascular:  Negative for chest pain and palpitations. Gastrointestinal:  Negative for nausea and vomiting. Genitourinary:  Negative for dysuria and frequency. Musculoskeletal:  Negative for back pain and falls. Skin:  Negative for itching and rash. Neurological:  Negative for dizziness, loss of consciousness and headaches. Endo/Heme/Allergies: Negative. Psychiatric/Behavioral:  Negative for depression. The patient is not nervous/anxious. Physical Exam  Vitals reviewed. Constitutional:       Appearance: Normal appearance. HENT:      Head: Normocephalic and atraumatic. Right Ear: Tympanic membrane, ear canal and external ear normal.      Left Ear: Tympanic membrane, ear canal and external ear normal.      Nose: Nose normal.      Mouth/Throat:      Mouth: Mucous membranes are moist.      Pharynx: Oropharynx is clear. Eyes:      Extraocular Movements: Extraocular movements intact. Conjunctiva/sclera: Conjunctivae normal.      Pupils: Pupils are equal, round, and reactive to light. Cardiovascular:      Rate and Rhythm: Normal rate and regular rhythm. Pulses: Normal pulses. Heart sounds: Normal heart sounds. Pulmonary:      Effort: Pulmonary effort is normal.      Breath sounds: Normal breath sounds. Abdominal:      General: Abdomen is flat. Bowel sounds are normal.      Palpations: Abdomen is soft. Genitourinary:     Comments: Prostate was enlarged, smooth, symmetrical with no nodules; guaiac negative. Musculoskeletal:         General: Normal range of motion. Cervical back: Normal range of motion and neck supple. Skin:     General: Skin is warm and dry. Neurological:      General: No focal deficit present. Mental Status: He is alert and oriented to person, place, and time. Psychiatric:         Mood and Affect: Mood normal.         Behavior: Behavior normal.         Judgment: Judgment normal.         ASSESSMENT and PLAN  Diagnoses and all orders for this visit:    1. Physical exam  Pt presents today for a CPE, which I performed. All findings were normal. Pt will have updated lab work performed during today's [address]; Future  -     CBC WITH AUTOMATED DIFF; Future  -     LIPID PANEL; Future  -     TSH 3RD GENERATION; Future  -     T4, FREE; Future  -     URINALYSIS W/MICROSCOPIC; Future  -     PSA W/ REFLX FREE PSA; Future    2. Obesity, Class I, BMI 30-34.9  I have reviewed/discussed the above normal BMI with the patient. I have recommended the following interventions: dietary management education, guidance, and counseling, encourage exercise and monitor weight. 3. Attention deficit disorder, unspecified hyperactivity presence  Pt continues with Vyvanse 40mg in AM and 30mg in PM. Pt requests a refill of their medication, which I have granted. -     Lisdexamfetamine (VYVANSE) 40 mg capsule; Take 1 Capsule by mouth daily for 29 days. Max Daily Amount: 40 mg.  -     Lisdexamfetamine (VYVANSE) 40 mg capsule; Take 1 Capsule by mouth daily for 29 days. Max Daily Amount: 40 mg.  -     Lisdexamfetamine (VYVANSE) 40 mg capsule; Take 1 Capsule by mouth daily for 29 days. Max Daily Amount: 40 mg.  -     lisdexamfetamine (VYVANSE) 30 mg capsule; Take 1 Capsule by mouth every morning for 29 days. Max Daily Amount: 30 mg.  -     lisdexamfetamine (VYVANSE) 30 mg capsule; Take 1 Capsule by mouth every morning for 29 days. Max Daily Amount: 30 mg.  -     lisdexamfetamine (VYVANSE) 30 mg capsule; Take 1 Capsule by mouth every morning for 29 days.  Max Daily Amount: 30 mg.    4. Anxiety  Pt continues with doxepin 10mg nightly and Xanax 1mg as needed. Pt requests a refill of their medication, which I have granted. -     ALPRAZolam (XANAX) 1 mg tablet; Take 1 Tablet by mouth nightly as needed for Anxiety. Max Daily Amount: 1 mg.    5. Insomnia, unspecified type  I prescribed Quviviq 25mg nightly. -     daridorexant (Quviviq) 25 mg tab; Take 1 Tablet by mouth nightly. Max Daily Amount: 1 Tablet. 6. Pulmonary nodule  CT of heart on 10/26/21 showed, \"Tiny pulmonary nodules in the right lower lobe are nonspecific\" and advised 12 month follow up. I have ordered a follow up CT of the chest.   -     CT CHEST W CONT; Future      Follow-up and Dispositions    Return in about 3 months (around 11/23/2022) for ADD. Medication risks/benefits/costs/interactions/alternatives discussed with patient. Advised patient to call back or return to office if symptoms worsen/change/persist.  If patient cannot reach us or should anything more severe/urgent arise he/she should proceed directly to the nearest emergency department. Discussed expected course/resolution/complications of diagnosis in detail with patient. Patient given a written after visit summary which includes diagnoses, current medications and vitals. Patient expressed understanding with the diagnosis and plan. Written by jany Willett, as dictated by Carla Howe M.D.    4:11 PM - 4:40 PM    Total time spent with the patient 30 minutes, greater than 50% of time spent counseling patient.

## 2022-08-23 NOTE — PROGRESS NOTES
Chief Complaint   Patient presents with    Physical     1. \"Have you been to the ER, urgent care clinic since your last visit? Hospitalized since your last visit? \" no    2. \"Have you seen or consulted any other health care providers outside of the [address] [place] [address] since your last visit? \"  no    3. For patients aged 39-70: Has the patient had a colonoscopy / FIT/ Cologuard? No gap       If the patient is female:    4. For patients aged 41-77: Has the patient had a mammogram within the past 2 years? 5. For patients aged 21-65: Has the patient had a pap smear?

## 2022-09-19 NOTE — TELEPHONE ENCOUNTER
Last Visit: 8/23/22 MD Pop  Next Appointment: 11/28/22 MD Pop  Previous Refill Encounter(s): 3/28/22 90 + 1    Requested Prescriptions     Pending Prescriptions Disp Refills    doxepin (SINEquan) 10 mg capsule 90 Capsule 1     Sig: Take 1 Capsule by mouth nightly. For [facility] in place:   Recommendation Provided To:    Intervention Detail: New Rx: 1, reason: Patient Preference  Gap Closed?:   Intervention Accepted By:   Time Spent (min): 5

## 2022-09-20 RX ORDER — DOXEPIN HYDROCHLORIDE 10 MG/1
10 CAPSULE ORAL
Qty: 90 CAPSULE | Refills: 1 | Status: SHIPPED | OUTPATIENT
Start: 2022-09-20

## 2022-10-31 DIAGNOSIS — G47.00 INSOMNIA, UNSPECIFIED TYPE: ICD-10-CM

## 2022-10-31 NOTE — TELEPHONE ENCOUNTER
Patient refill request for zolpidem. Check . Thanks, Bucky Kevin    Last Visit: 8/23/22 MD Pop  Next Appointment: 11/28/22 MD Pop  Previous Refill Encounter(s): 7/26/22 15    Requested Prescriptions     Pending Prescriptions Disp Refills    zolpidem (Ambien) 5 mg tablet 15 Tablet 0     Sig: Take 1 Tablet by mouth nightly as needed for Sleep. Max Daily Amount: 5 mg. For [address] [facility] in place:   Recommendation Provided To:    Intervention Detail: New Rx: 1, reason: Patient Preference  Gap Closed?:   Intervention Accepted By:   Time Spent (min): 5

## 2022-11-04 RX ORDER — ZOLPIDEM TARTRATE 5 MG/1
5 TABLET ORAL
Qty: 15 TABLET | Refills: 0 | Status: SHIPPED | OUTPATIENT
Start: 2022-11-04

## 2022-11-19 DIAGNOSIS — F98.8 ATTENTION DEFICIT DISORDER, UNSPECIFIED HYPERACTIVITY PRESENCE: ICD-10-CM

## 2022-11-21 NOTE — TELEPHONE ENCOUNTER
MD Josselyn Cornelius,    Patient mychart request for refills of both Vyvanse rx's. Stated will run out on 11/25/22 with appt scheduled on 11/28/22. Check . Does not want to run out with family in town per message. Thanks, Jayleneboogienael Blunt    Last Visit: 8/23/22  MD Pop  Next Appointment: 11/28/22 MD Pop  Previous Refill Encounter(s): 10/25/22 30 (both)    Requested Prescriptions     Pending Prescriptions Disp Refills    Lisdexamfetamine (VYVANSE) 40 mg capsule 30 Capsule 0     Sig: Take 1 Capsule by mouth daily for 29 days. Max Daily Amount: 40 mg.    lisdexamfetamine (VYVANSE) 30 mg capsule 30 Capsule 0     Sig: Take 1 Capsule by mouth every morning for 29 days. Max Daily Amount: 30 mg. For [facility] in place:   Recommendation Provided To:    Intervention Detail: New Rx: 2, reason: Patient Preference  Gap Closed?:   Intervention Accepted By:   Time Spent (min): 5

## 2022-11-28 ENCOUNTER — OFFICE VISIT (OUTPATIENT)
Dept: FAMILY MEDICINE CLINIC | Age: 52
End: 2022-11-28
Payer: COMMERCIAL

## 2022-11-28 VITALS — BODY MASS INDEX: 32.91 KG/M2 | WEIGHT: 222.2 LBS | HEIGHT: 69 IN

## 2022-11-28 DIAGNOSIS — G47.00 INSOMNIA, UNSPECIFIED TYPE: ICD-10-CM

## 2022-11-28 DIAGNOSIS — F98.8 ATTENTION DEFICIT DISORDER, UNSPECIFIED HYPERACTIVITY PRESENCE: Primary | ICD-10-CM

## 2022-11-28 DIAGNOSIS — E66.9 OBESITY, CLASS I, BMI 30-34.9: ICD-10-CM

## 2022-11-28 PROCEDURE — 99214 OFFICE O/P EST MOD 30 MIN: CPT | Performed by: FAMILY MEDICINE

## 2022-11-28 RX ORDER — DOXEPIN HYDROCHLORIDE 10 MG/1
10 CAPSULE ORAL
Qty: 90 CAPSULE | Refills: 1 | Status: SHIPPED | OUTPATIENT
Start: 2022-11-28

## 2022-11-28 NOTE — PROGRESS NOTES
HPI  Mortimer Samuel 47 y.o. male  presents to the office today for follow up on ADD. Height 5' 9\" (1.753 m), weight 222 lb 3.2 oz (100.8 kg). Body mass index is 32.81 kg/m². No chief complaint on file. ADD: Pt continues with Vyvanse 40mg in AM and 30mg in PM. This dose has been working very well for him. Insomnia: Pt continues with doxepin 10mg nightly. Health Maintenance: Pt due for bivalent COVID booster-- I reminded him to get this. Current Outpatient Medications   Medication Sig Dispense Refill    [START ON 1/29/2023] lisdexamfetamine (VYVANSE) 30 mg capsule Take 1 Capsule by mouth every morning for 29 days. Max Daily Amount: 30 mg. 30 Capsule 0    [START ON 12/29/2022] lisdexamfetamine (VYVANSE) 30 mg capsule Take 1 Capsule by mouth daily. Max Daily Amount: 30 mg. 30 Capsule 0    [START ON 12/29/2022] Lisdexamfetamine (VYVANSE) 40 mg capsule Take 1 Capsule by mouth daily for 29 days. Max Daily Amount: 40 mg. 30 Capsule 0    [START ON 1/29/2023] Lisdexamfetamine (VYVANSE) 40 mg capsule Take 1 Capsule by mouth daily for 29 days. Max Daily Amount: 40 mg. 30 Capsule 0    [START ON 2/27/2023] Lisdexamfetamine (VYVANSE) 40 mg capsule Take 1 Capsule by mouth daily. Max Daily Amount: 40 mg. 30 Capsule 0    [START ON 2/27/2023] lisdexamfetamine (VYVANSE) 30 mg capsule Take 1 Capsule by mouth every evening. Max Daily Amount: 30 mg. Indications: attention deficit disorder with hyperactivity, binge eating disorder 30 Capsule 0    doxepin (SINEquan) 10 mg capsule Take 1 Capsule by mouth nightly. 90 Capsule 1    Lisdexamfetamine (VYVANSE) 40 mg capsule Take 1 Capsule by mouth daily for 29 days. Max Daily Amount: 40 mg. 30 Capsule 0    zolpidem (Ambien) 5 mg tablet Take 1 Tablet by mouth nightly as needed for Sleep. Max Daily Amount: 5 mg. 15 Tablet 0    ALPRAZolam (XANAX) 1 mg tablet Take 1 Tablet by mouth nightly as needed for Anxiety.  Max Daily Amount: 1 mg. 15 Tablet 0    Lisdexamfetamine (VYVANSE) 40 mg capsule Take 1 Capsule by mouth in the morning. Max Daily Amount: 40 mg. Indications: attention deficit disorder with hyperactivity, binge eating disorder 30 Capsule 0    tadalafiL (CIALIS) 10 mg tablet Take 1 Tablet by mouth daily as needed for Erectile Dysfunction. 20 Tablet 0    cholecalciferol, vitamin D3, 50 mcg (2,000 unit) tab Take 2,000 Units by mouth.      magnesium 250 mg tab Take  by mouth daily. No Known Allergies  Past Medical History:   Diagnosis Date    ADD (attention deficit disorder)     Alcohol use     ED (erectile dysfunction)     Liver disease     FATTY liver    Preglaucoma of both eyes      Past Surgical History:   Procedure Laterality Date    HX HEENT      wisdom    HX ORTHOPAEDIC      knee left     Family History   Problem Relation Age of Onset    Glaucoma Father     Hypertension Father     Stroke Sister      Social History     Tobacco Use    Smoking status: Never    Smokeless tobacco: Never    Tobacco comments:     occasional    Substance Use Topics    Alcohol use: Yes     Alcohol/week: 43.0 standard drinks     Types: 28 Shots of liquor, 15 Standard drinks or equivalent per week     Comment: 3-4 drinks per day        Review of Systems   Constitutional:  Negative for chills and fever. HENT:  Negative for hearing loss and tinnitus. Eyes:  Negative for blurred vision and double vision. Respiratory:  Negative for cough and shortness of breath. Cardiovascular:  Negative for chest pain and palpitations. Gastrointestinal:  Negative for nausea and vomiting. Genitourinary:  Negative for dysuria and frequency. Musculoskeletal:  Negative for back pain and falls. Skin:  Negative for itching and rash. Neurological:  Negative for dizziness, loss of consciousness and headaches. Endo/Heme/Allergies: Negative. Psychiatric/Behavioral:  Negative for depression. The patient is not nervous/anxious. Physical Exam  Vitals reviewed.    Constitutional:       Appearance: Normal appearance. HENT:      Head: Normocephalic and atraumatic. Right Ear: Tympanic membrane, ear canal and external ear normal.      Left Ear: Tympanic membrane, ear canal and external ear normal.      Nose: Nose normal.      Mouth/Throat:      Mouth: Mucous membranes are moist.      Pharynx: Oropharynx is clear. Eyes:      Extraocular Movements: Extraocular movements intact. Conjunctiva/sclera: Conjunctivae normal.      Pupils: Pupils are equal, round, and reactive to light. Cardiovascular:      Rate and Rhythm: Normal rate and regular rhythm. Pulses: Normal pulses. Heart sounds: Normal heart sounds. Pulmonary:      Effort: Pulmonary effort is normal.      Breath sounds: Normal breath sounds. Abdominal:      General: Abdomen is flat. Bowel sounds are normal.      Palpations: Abdomen is soft. Musculoskeletal:         General: Normal range of motion. Cervical back: Normal range of motion and neck supple. Skin:     General: Skin is warm and dry. Neurological:      General: No focal deficit present. Mental Status: He is alert and oriented to person, place, and time. Psychiatric:         Mood and Affect: Mood normal.         Behavior: Behavior normal.         Judgment: Judgment normal.         ASSESSMENT and PLAN  Diagnoses and all orders for this visit:    1. Attention deficit disorder, unspecified hyperactivity presence  Pt continues with Vyvanse 40mg in AM and 30mg in PM. Pt requests a refill of their medication, which I have granted. The Prescription Monitoring Program has been reviewed for recent activity regarding controlled substances for this patient. -     lisdexamfetamine (VYVANSE) 30 mg capsule; Take 1 Capsule by mouth every morning for 29 days. Max Daily Amount: 30 mg.  -     lisdexamfetamine (VYVANSE) 30 mg capsule; Take 1 Capsule by mouth daily. Max Daily Amount: 30 mg.  -     Lisdexamfetamine (VYVANSE) 40 mg capsule; Take 1 Capsule by mouth daily for 29 days. Max Daily Amount: 40 mg.  -     Lisdexamfetamine (VYVANSE) 40 mg capsule; Take 1 Capsule by mouth daily for 29 days. Max Daily Amount: 40 mg.  -     Lisdexamfetamine (VYVANSE) 40 mg capsule; Take 1 Capsule by mouth daily. Max Daily Amount: 40 mg.  -     lisdexamfetamine (VYVANSE) 30 mg capsule; Take 1 Capsule by mouth every evening. Max Daily Amount: 30 mg. Indications: attention deficit disorder with hyperactivity, binge eating disorder    2. Obesity, Class I, BMI 30-34.9  See #1.   -     lisdexamfetamine (VYVANSE) 30 mg capsule; Take 1 Capsule by mouth every morning for 29 days. Max Daily Amount: 30 mg.  -     lisdexamfetamine (VYVANSE) 30 mg capsule; Take 1 Capsule by mouth daily. Max Daily Amount: 30 mg.  -     Lisdexamfetamine (VYVANSE) 40 mg capsule; Take 1 Capsule by mouth daily for 29 days. Max Daily Amount: 40 mg.  -     Lisdexamfetamine (VYVANSE) 40 mg capsule; Take 1 Capsule by mouth daily for 29 days. Max Daily Amount: 40 mg.  -     Lisdexamfetamine (VYVANSE) 40 mg capsule; Take 1 Capsule by mouth daily. Max Daily Amount: 40 mg.  -     lisdexamfetamine (VYVANSE) 30 mg capsule; Take 1 Capsule by mouth every evening. Max Daily Amount: 30 mg. Indications: attention deficit disorder with hyperactivity, binge eating disorder    3. Insomnia, unspecified type  Pt continues with doxepin 10mg nightly. Pt requests a refill of their medication, which I have granted. -     doxepin (SINEquan) 10 mg capsule; Take 1 Capsule by mouth nightly. Follow-up and Dispositions    Return in about 3 months (around 2/28/2023) for ADD. Medication risks/benefits/costs/interactions/alternatives discussed with patient. Advised patient to call back or return to office if symptoms worsen/change/persist.  If patient cannot reach us or should anything more severe/urgent arise he/she should proceed directly to the nearest emergency department.   Discussed expected course/resolution/complications of diagnosis in detail with patient. Patient given a written after visit summary which includes diagnoses, current medications and vitals. Patient expressed understanding with the diagnosis and plan. Written by jany Beckwith, as dictated by Ceci Wood M.D.    3:55 PM - 4:06 PM    Total time spent with the patient 10 minutes, greater than 50% of time spent counseling patient.

## 2022-12-12 DIAGNOSIS — Z00.00 PHYSICAL EXAM: ICD-10-CM

## 2022-12-12 LAB
ALBUMIN SERPL-MCNC: 3.9 G/DL (ref 3.5–5)
ALBUMIN/GLOB SERPL: 1.3 {RATIO} (ref 1.1–2.2)
ALP SERPL-CCNC: 71 U/L (ref 45–117)
ALT SERPL-CCNC: 43 U/L (ref 12–78)
ANION GAP SERPL CALC-SCNC: 6 MMOL/L (ref 5–15)
APPEARANCE UR: CLEAR
AST SERPL-CCNC: 22 U/L (ref 15–37)
BACTERIA URNS QL MICRO: NEGATIVE /HPF
BASOPHILS # BLD: 0 K/UL (ref 0–0.1)
BASOPHILS NFR BLD: 1 % (ref 0–1)
BILIRUB SERPL-MCNC: 0.7 MG/DL (ref 0.2–1)
BILIRUB UR QL: NEGATIVE
BUN SERPL-MCNC: 27 MG/DL (ref 6–20)
BUN/CREAT SERPL: 22 (ref 12–20)
CALCIUM SERPL-MCNC: 9 MG/DL (ref 8.5–10.1)
CHLORIDE SERPL-SCNC: 107 MMOL/L (ref 97–108)
CHOLEST SERPL-MCNC: 191 MG/DL
CO2 SERPL-SCNC: 28 MMOL/L (ref 21–32)
COLOR UR: NORMAL
CREAT SERPL-MCNC: 1.21 MG/DL (ref 0.7–1.3)
DIFFERENTIAL METHOD BLD: ABNORMAL
EOSINOPHIL # BLD: 0.2 K/UL (ref 0–0.4)
EOSINOPHIL NFR BLD: 5 % (ref 0–7)
EPITH CASTS URNS QL MICRO: NORMAL /LPF
ERYTHROCYTE [DISTWIDTH] IN BLOOD BY AUTOMATED COUNT: 12.5 % (ref 11.5–14.5)
GLOBULIN SER CALC-MCNC: 2.9 G/DL (ref 2–4)
GLUCOSE SERPL-MCNC: 124 MG/DL (ref 65–100)
GLUCOSE UR STRIP.AUTO-MCNC: NEGATIVE MG/DL
HCT VFR BLD AUTO: 50.9 % (ref 36.6–50.3)
HDLC SERPL-MCNC: 51 MG/DL
HDLC SERPL: 3.7 {RATIO} (ref 0–5)
HGB BLD-MCNC: 16.4 G/DL (ref 12.1–17)
HGB UR QL STRIP: NEGATIVE
HYALINE CASTS URNS QL MICRO: NORMAL /LPF (ref 0–5)
IMM GRANULOCYTES # BLD AUTO: 0 K/UL (ref 0–0.04)
IMM GRANULOCYTES NFR BLD AUTO: 0 % (ref 0–0.5)
KETONES UR QL STRIP.AUTO: NEGATIVE MG/DL
LDLC SERPL CALC-MCNC: 124.2 MG/DL (ref 0–100)
LEUKOCYTE ESTERASE UR QL STRIP.AUTO: NEGATIVE
LYMPHOCYTES # BLD: 1.7 K/UL (ref 0.8–3.5)
LYMPHOCYTES NFR BLD: 32 % (ref 12–49)
MCH RBC QN AUTO: 30 PG (ref 26–34)
MCHC RBC AUTO-ENTMCNC: 32.2 G/DL (ref 30–36.5)
MCV RBC AUTO: 93.2 FL (ref 80–99)
MONOCYTES # BLD: 0.5 K/UL (ref 0–1)
MONOCYTES NFR BLD: 9 % (ref 5–13)
NEUTS SEG # BLD: 2.8 K/UL (ref 1.8–8)
NEUTS SEG NFR BLD: 53 % (ref 32–75)
NITRITE UR QL STRIP.AUTO: NEGATIVE
NRBC # BLD: 0 K/UL (ref 0–0.01)
NRBC BLD-RTO: 0 PER 100 WBC
PH UR STRIP: 5.5 [PH] (ref 5–8)
PLATELET # BLD AUTO: 220 K/UL (ref 150–400)
PMV BLD AUTO: 10.5 FL (ref 8.9–12.9)
POTASSIUM SERPL-SCNC: 4.4 MMOL/L (ref 3.5–5.1)
PROT SERPL-MCNC: 6.8 G/DL (ref 6.4–8.2)
PROT UR STRIP-MCNC: NEGATIVE MG/DL
RBC # BLD AUTO: 5.46 M/UL (ref 4.1–5.7)
RBC #/AREA URNS HPF: NORMAL /HPF (ref 0–5)
SODIUM SERPL-SCNC: 141 MMOL/L (ref 136–145)
SP GR UR REFRACTOMETRY: 1.03 (ref 1–1.03)
T4 FREE SERPL-MCNC: 1 NG/DL (ref 0.8–1.5)
TRIGL SERPL-MCNC: 79 MG/DL (ref ?–150)
TSH SERPL DL<=0.05 MIU/L-ACNC: 0.83 UIU/ML (ref 0.36–3.74)
UROBILINOGEN UR QL STRIP.AUTO: 0.2 EU/DL (ref 0.2–1)
VLDLC SERPL CALC-MCNC: 15.8 MG/DL
WBC # BLD AUTO: 5.2 K/UL (ref 4.1–11.1)
WBC URNS QL MICRO: NORMAL /HPF (ref 0–4)

## 2022-12-13 LAB
PSA SERPL-MCNC: 1.7 NG/ML (ref 0–4)
REFLEX CRITERIA: NORMAL

## 2022-12-15 ENCOUNTER — HOSPITAL ENCOUNTER (OUTPATIENT)
Dept: CT IMAGING | Age: 52
Discharge: HOME | End: 2022-12-15
Attending: FAMILY MEDICINE
Payer: COMMERCIAL

## 2022-12-15 ENCOUNTER — HOSPITAL ENCOUNTER (OUTPATIENT)
Dept: ULTRASOUND IMAGING | Age: 52
Discharge: HOME | End: 2022-12-15
Attending: FAMILY MEDICINE
Payer: COMMERCIAL

## 2022-12-15 DIAGNOSIS — K76.0 FATTY LIVER: Primary | ICD-10-CM

## 2022-12-15 DIAGNOSIS — R91.1 PULMONARY NODULE: ICD-10-CM

## 2022-12-15 DIAGNOSIS — R14.0 BLOATED ABDOMEN: ICD-10-CM

## 2022-12-15 DIAGNOSIS — K76.0 FATTY LIVER: ICD-10-CM

## 2022-12-15 PROCEDURE — 74011000636 HC RX REV CODE- 636: Performed by: FAMILY MEDICINE

## 2022-12-15 PROCEDURE — 76700 US EXAM ABDOM COMPLETE: CPT

## 2022-12-15 PROCEDURE — 71260 CT THORAX DX C+: CPT

## 2022-12-15 RX ADMIN — IOPAMIDOL 100 ML: 612 INJECTION, SOLUTION INTRAVENOUS at 08:27

## 2023-01-26 ENCOUNTER — OFFICE VISIT (OUTPATIENT)
Dept: HEMATOLOGY | Age: 53
End: 2023-01-26
Payer: COMMERCIAL

## 2023-01-26 VITALS
HEART RATE: 73 BPM | SYSTOLIC BLOOD PRESSURE: 137 MMHG | HEIGHT: 69 IN | RESPIRATION RATE: 18 BRPM | WEIGHT: 219 LBS | BODY MASS INDEX: 32.44 KG/M2 | DIASTOLIC BLOOD PRESSURE: 96 MMHG | OXYGEN SATURATION: 98 % | TEMPERATURE: 97.9 F

## 2023-01-26 DIAGNOSIS — K76.0 FATTY LIVER: Primary | ICD-10-CM

## 2023-01-26 NOTE — PROGRESS NOTES
Identified pt with two pt identifiers(name and ). Reviewed record in preparation for visit and have obtained necessary documentation. Chief Complaint   Patient presents with    Fatty Liver    Other     Fibroscan only      Vitals:    23 1456   BP: (!) 137/96   Pulse: 73   Resp: 18   Temp: 97.9 °F (36.6 °C)   TempSrc: Temporal   SpO2: 98%   Weight: 219 lb (99.3 kg)   Height: 5' 9\" (1.753 m)   PainSc:   0 - No pain       Health Maintenance Review: Patient reminded of \"due or due soon\" health maintenance. I have asked the patient to contact his/her primary care provider (PCP) for follow-up on his/her health maintenance. Coordination of Care Questionnaire:  :   1) Have you been to an emergency room, urgent care, or hospitalized since your last visit? If yes, where when, and reason for visit? no       2. Have seen or consulted any other health care provider since your last visit? If yes, where when, and reason for visit? NO      Patient is accompanied by self I have received verbal consent from Corrine Carreno to discuss any/all medical information while they are present in the room.

## 2023-01-26 NOTE — PROGRESS NOTES
[address] [facility] [address], MD, FACP, [place], [place]      CHANTAL Real S Franky, AGPCNP-BC   Юлия Cerda, [facility]-   Mehran Xiong, FNP-C  Adiel Alexei, FNP-C   Christiano Briggs, AGPCNP-BC      [address]   at [address], [facility] Ankita White  22.   [phone]   FAX: [phone]   at [address], [address] [place] [address], [address]   [phone]   FAX: [phone]       Patient Care Team:  Chase Caldwell MD as PCP - Marcia Ruiz MD as PCP - [facility] Dominguez Hopeled Provider    Patient Active Problem List   Diagnosis Code    AR (allergic rhinitis) J30.9    ED (erectile dysfunction) N52.9    ADD (attention deficit disorder) F98.8    Primary osteoarthritis of right knee M17.11    Allergic reaction T78.40XA    Vitamin D deficiency E55.9    Obesity, Class I, BMI 30-34.9 E66.9    Family history of premature CAD Z82.49    Psoriasis L40.9    Elevated LFTs R79.89    Fatty liver K76.0     Katy Avery is being seen at [address] for management of fatty liver. The active problem list, all pertinent past medical history, medications, liver histology and laboratory findings related to the liver disorder were reviewed with the patient. The patient is a 47 y.o.  male who was found to have liver disease in 12/2019 via ultrasound. FibroScan was performed 12/2019 and showed fatty liver and no fibrosis. Repeat study in 2020 showed a similar result. This will be repeated today. This is his first return visit in ~2.5 years. His PCP had suggested a return visit for updated Fibroscan. The patient has no symptoms which can be attributed to the liver disorder.  He has had recent US of the abdomen suggesting ongoing steatosis and + gallstones, he has been without significant pain symptoms. At his past office visit, he had lost weight, decreased alcohol use and substantially increased his exercise. He has had some increased weight in the past few years and reports that his current pattern of alcohol intake is ~10 glasses wine weekly and a few liquor drinks on weekends. This represent a bit of a reduction and he is trying to continue with conscious reduction in intake. ASSESSMENT AND PLAN:  Fatty liver disease with no fibrosis. Suspect the patient has fatty liver based upon imaging, FibroScan CAP score, serologic studies that are negative for other causes of chronic liver disease. Serologic testing for causes of chronic liver disease was negative for HCV, HBV. Elastography performed today shows ongoing steatosis but with no significant fibrosis progression. The absolute value of the Fibroscan is a bit higher in the past 2 years, but this is still categorically F0-1. FibroScan liver stiffness is normal suggesting that he has NAFL not SMITH. NAFL is a benign form of fatty liver disease and not thought to progress to fibrosis or cirrhosis. Counseling for diet and weight loss in patients with confirmed or suspected NAFLD  The patient has changed his diet and now does daily exercise. He has lost 10% of initial body weight from 215 to 194 pounds. He has had gradual regain and we have discussed that a target of 205-210# would be a reasonable maintenance weight. He will restart efforts. 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       Elastography performed in 12/2019 suggests fatty liver consistent with alcohol etiology and no fibrosis     The patient is now consuming 10-15 drinks weekly we have discussed trying to cut back on intake to reduce direct impact on liver and as a means to reduce caloric intake and support weight loss. The FibroScan can be repeated annually or as often as clinically indicated to assess for fibrosis progression and/or regression. He would like to follow-up annually. Elevation in ferritin  There is an elevation in ferritin   The patient is unlikely to have hemochromatosis but may be a carrier for an HFE gene. Most likely the elevation in ferritin is due to fatty liver and previous alcohol use. Past repeat ferritin and iron panels were improved with alcohol reduction. Treatment of other medical problems in patients with chronic liver disease  There are no contraindications for the patient to take most medications that are necessary for treatment of other medical issues. Vaccinations   The need for vaccination against viral hepatitis A and B will be assessed with serologic and instituted as appropriate. Routine vaccinations against other bacterial and viral agents can be performed as indicated. Annual flu vaccination should be administered if indicated. No Known Allergies    Current Outpatient Medications on File Prior to Visit   Medication Sig Dispense Refill    ALPRAZolam (XANAX) 1 mg tablet Take 1 Tablet by mouth nightly as needed for Anxiety. Max Daily Amount: 1 mg. 10 Tablet 0    [START ON 1/29/2023] lisdexamfetamine (VYVANSE) 30 mg capsule Take 1 Capsule by mouth every morning for 29 days. Max Daily Amount: 30 mg. 30 Capsule 0    doxepin (SINEquan) 10 mg capsule Take 1 Capsule by mouth nightly. 90 Capsule 1    zolpidem (Ambien) 5 mg tablet Take 1 Tablet by mouth nightly as needed for Sleep. Max Daily Amount: 5 mg. 15 Tablet 0    Lisdexamfetamine (VYVANSE) 40 mg capsule Take 1 Capsule by mouth in the morning. Max Daily Amount: 40 mg. Indications: attention deficit disorder with hyperactivity, binge eating disorder 30 Capsule 0    tadalafiL (CIALIS) 10 mg tablet Take 1 Tablet by mouth daily as needed for Erectile Dysfunction. 20 Tablet 0    cholecalciferol, vitamin D3, 50 mcg (2,000 unit) tab Take 2,000 Units by mouth daily. magnesium 250 mg tab Take  by mouth daily. lisdexamfetamine (VYVANSE) 30 mg capsule Take 1 Capsule by mouth daily. Max Daily Amount: 30 mg. 30 Capsule 0    [START ON 2/27/2023] lisdexamfetamine (VYVANSE) 30 mg capsule Take 1 Capsule by mouth every evening. Max Daily Amount: 30 mg. Indications: attention deficit disorder with hyperactivity, binge eating disorder 30 Capsule 0    [DISCONTINUED] Lisdexamfetamine (VYVANSE) 40 mg capsule Take 1 Capsule by mouth daily for 29 days. Max Daily Amount: 40 mg. 30 Capsule 0    [DISCONTINUED] Lisdexamfetamine (VYVANSE) 40 mg capsule Take 1 Capsule by mouth daily for 29 days. Max Daily Amount: 40 mg. 30 Capsule 0    [DISCONTINUED] Lisdexamfetamine (VYVANSE) 40 mg capsule Take 1 Capsule by mouth daily. Max Daily Amount: 40 mg. 30 Capsule 0     No current facility-administered medications on file prior to visit. SYSTEM REVIEW NOT RELATED TO LIVER DISEASE OR REVIEWED ABOVE:  Constitution systems: Negative for fever, chills. Recent weight gain. Eyes: Negative for visual changes. ENT: Negative for sore throat, painful swallowing. Respiratory: Negative for cough, hemoptysis, SOB. Cardiology: Negative for chest pain, palpitations. GI:  Negative for constipation or diarrhea. : Negative for urinary frequency, dysuria, hematuria, nocturia. Skin: Negative for rash. Hematology: Negative for easy bruising, blood clots. Musculo-skeletal: Negative for back pain, muscle pain, weakness. Neurologic: Negative for headaches, dizziness, vertigo, memory problems not related to HE. Psychology: Negative for anxiety, depression.      PHYSICAL EXAMINATION:  Visit Vitals  BP (!) 137/96 (BP 1 Location: Right upper arm, BP Patient Position: Sitting, BP Cuff Size: Adult)   Pulse 73   Temp 97.9 °F (36.6 °C) (Temporal)   Resp 18   Ht 5' 9\" (1.753 m)   Wt 219 lb (99.3 kg)   SpO2 98% BMI 32.34 kg/m²     General: No acute distress. Skin: No spider angiomata. No jaundice. No palmar erythema. Abdomen: Soft non-tender. Liver size normal to percussion/palpation. Spleen not palpable. No obvious ascites. Extremities: No edema. No muscle wasting. No gross arthritic changes. Neurologic: Alert and oriented. Cranial nerves grossly intact. No asterixis. LABORATORY STUDIES:  [facility] [place] [address] & Units 12/12/2022 10/26/2021   WBC 4.1 - 11.1 K/uL 5.2 5.0   ANC 1.8 - 8.0 K/UL 2.8 2.7   HGB 12.1 - 17.0 g/dL 16.4 16.6    - 400 K/uL 220 216   AST 15 - 37 U/L 22 20   ALT 12 - 78 U/L 43 48   Alk Phos 45 - 117 U/L 71 75   Bili, Total 0.2 - 1.0 MG/DL 0.7 0.6   Bili, Direct 0.0 - 0.2 MG/DL     Albumin 3.5 - 5.0 g/dL 3.9 3.9   BUN 6 - 20 MG/DL 27 (H) 26 (H)   Creat 0.70 - 1.30 MG/DL 1.21 1.19   Na 136 - 145 mmol/L 141 137   K 3.5 - 5.1 mmol/L 4.4 4.0   Cl 97 - 108 mmol/L 107 105   CO2 21 - 32 mmol/L 28 28   Glucose 65 - 100 mg/dL 124 (H) 116 (H)     SEROLOGIES:  Serologies Latest Ref Rng & Units 12/12/2019   Hep B Surface Ag Negative Negative   Ferritin 30 - 400 ng/mL 799 (H)   12/2019. HCV Ab negative. LIVER HISTOLOGY:    12/2019. FibroScan performed at [facility]. EkPa was 4.6. IQR/med 2%. . The results suggested a fibrosis level of F0. The CAP score suggests fatty liver. 8/2020. FibroScan performed at [facility]. EkPa was 4.6. IQR/med 13%. . The results suggested a fibrosis level of F0. The CAP score suggests fatty liver. 1/2023. FibroScan performed at [facility] [employer]. EkPa was 6.8. Suggested fibrosis level is F0-1. CAP score is 298, this is consistent with steatosis. ENDOSCOPIC PROCEDURES:  Not available or performed    RADIOLOGY:  12/2019. Abdominal ultrasound. Increased echogenicity of liver. Liver span noted as 19 mm but that is likely a typo. If 19 cm, enlarged but no signs of cirrhosis. 12/2022. Ultrasound of liver. Echogenic consistent with chronic liver disease/steatosis. No liver mass lesions. No dilated bile ducts. No ascites.+ gallstones. OTHER TESTING:  Not available or performed    FOLLOW-UP:  All of the issues listed above in the assessment and plan were discussed with the patient. All questions were answered. The patient expressed a clear understanding of the above. Follow up in the [place] [address] 12 months with repeat Fibroscan to monitor for progression of steatosis and fibrosis. Work on weight losses and alcohol elimination. Documentation reviewed and updated to reflect current, accurate patient information.     Bee Edmonds PA-C  Liver [facility], [address] RunAnkita escobar  22.  [phone]  [facility]7 [address]

## 2023-02-21 NOTE — PROGRESS NOTES
Kirstie Pacheco is a 47 y.o. male who was seen by synchronous (real-time) audio-video technology on 2/28/2023. Consent:  Services were provided through a video synchronous discussion virtually to substitute for in-person appointment. He and/or his healthcare decision maker is aware that this patient-initiated Telehealth encounter is a billable service, with coverage as determined by his insurance carrier. He is aware that he may receive a bill and has provided verbal consent to proceed: Yes    I was in the office while conducting this encounter. Subjective:   Kirstie Pacheco was seen for No chief complaint on file. ADD: Pt takes Vyvanse 40mg in AM and 30mg in PM.     Insomnia: Pt takes doxepin 10mg nightly. Pt takes Xanax 1mg nightly as needed for anxiety. Prior to Admission medications    Medication Sig Start Date End Date Taking? Authorizing Provider   ALPRAZolam Ivelisse Regulus) 1 mg tablet Take 1 Tablet by mouth nightly as needed for Anxiety. Max Daily Amount: 1 mg. 1/10/23   Gary Pop MD   lisdexamfetamine (VYVANSE) 30 mg capsule Take 1 Capsule by mouth every morning for 29 days. Max Daily Amount: 30 mg. 1/29/23 2/27/23  Gary Pop MD   lisdexamfetamine (VYVANSE) 30 mg capsule Take 1 Capsule by mouth daily. Max Daily Amount: 30 mg. 12/29/22   Gary Pop MD   lisdexamfetamine (VYVANSE) 30 mg capsule Take 1 Capsule by mouth every evening. Max Daily Amount: 30 mg. Indications: attention deficit disorder with hyperactivity, binge eating disorder 2/27/23   Ritu Meléndez MD   doxepin (SINEquan) 10 mg capsule Take 1 Capsule by mouth nightly. 11/28/22   Gary Pop MD   zolpidem (Ambien) 5 mg tablet Take 1 Tablet by mouth nightly as needed for Sleep. Max Daily Amount: 5 mg. 11/4/22   Gary Pop MD   Lisdexamfetamine (VYVANSE) 40 mg capsule Take 1 Capsule by mouth in the morning. Max Daily Amount: 40 mg.  Indications: attention deficit disorder with hyperactivity, binge eating disorder 7/26/22   Lorn Barthel, MD   tadalafiL (CIALIS) 10 mg tablet Take 1 Tablet by mouth daily as needed for Erectile Dysfunction. 8/6/21   Graeme Pop MD   cholecalciferol, vitamin D3, 50 mcg (2,000 unit) tab Take 2,000 Units by mouth daily. Provider, Historical   magnesium 250 mg tab Take  by mouth daily. Provider, Historical     No Known Allergies  Past Medical History:   Diagnosis Date    ADD (attention deficit disorder)     Alcohol use     Colon polyp 2014    One removed    ED (erectile dysfunction)     Liver disease     FATTY liver    Preglaucoma of both eyes      Past Surgical History:   Procedure Laterality Date    HX COLONOSCOPY  2018    Every 5 years    HX HEENT      wisdom    HX ORTHOPAEDIC      knee left     Family History   Problem Relation Age of Onset    Glaucoma Father     Hypertension Father     Heart Disease Father     Stroke Sister      Social History     Tobacco Use    Smoking status: Never    Smokeless tobacco: Never   Substance Use Topics    Alcohol use: Yes     Alcohol/week: 12.0 standard drinks     Types: 8 Glasses of wine, 4 Shots of liquor per week     Comment: Been trying to switch to wine max 20 a week        Review of Systems   Constitutional:  Negative for chills and fever. HENT:  Negative for hearing loss and tinnitus. Eyes:  Negative for blurred vision and double vision. Respiratory:  Negative for cough and shortness of breath. Cardiovascular:  Negative for chest pain and palpitations. Gastrointestinal:  Negative for nausea and vomiting. Genitourinary:  Negative for dysuria and frequency. Musculoskeletal:  Negative for back pain and falls. Skin:  Negative for itching and rash. Neurological:  Negative for dizziness, loss of consciousness and headaches. Endo/Heme/Allergies: Negative. Psychiatric/Behavioral:  Negative for depression. The patient is not nervous/anxious.       PHYSICAL EXAMINATION:  Vital Signs: (As obtained by patient/caregiver at home)  There were no vitals taken for this visit. Constitutional: [x] Appears well-developed and well-nourished [x] No apparent distress      [] Abnormal -     Mental status: [x] Alert and awake  [x] Oriented to person/place/time [x] Able to follow commands    [] Abnormal -     Eyes:   EOM    [x]  Normal    [] Abnormal -   Sclera  [x]  Normal    [] Abnormal -          Discharge [x]  None visible   [] Abnormal -     HENT: [x] Normocephalic, atraumatic  [] Abnormal -   [x] Mouth/Throat: Mucous membranes are moist    External Ears [x] Normal  [] Abnormal -    Neck: [x] No visualized mass [] Abnormal -     Pulmonary/Chest: [x] Respiratory effort normal   [x] No visualized signs of difficulty breathing or respiratory distress        [] Abnormal -      Musculoskeletal:   [x] Normal gait with no signs of ataxia         [x] Normal range of motion of neck        [] Abnormal -     Neurological:        [x] No Facial Asymmetry (Cranial nerve 7 motor function) (limited exam due to video visit)          [x] No gaze palsy        [] Abnormal -          Skin:        [x] No significant exanthematous lesions or discoloration noted on facial skin         [] Abnormal -            Psychiatric:       [x] Normal Affect [] Abnormal -        [x] No Hallucinations    Other pertinent observable physical exam findings:-    Assessment & Plan:   {There are no diagnoses linked to this encounter. (Refresh or delete this SmartLink)}            We discussed the expected course, resolution and complications of the diagnosis(es) in detail. Medication risks, benefits, costs, interactions, and alternatives were discussed as indicated. I advised her to contact the office if her condition worsens, changes or fails to improve as anticipated. She expressed understanding with the diagnosis(es) and plan.      Pursuant to the emergency declaration under the [address] and [facility] [address] and Response Supplemental Appropriations Act, this Virtual Visit was conducted, with patient's consent, to reduce the patient's risk of exposure to COVID-19 and provide continuity of care for an established patient. Services were provided through a video synchronous discussion virtually to substitute for in-person clinic visit. Total time spent with the patient *** minutes, greater than 50% of time spent counseling patient.

## 2023-02-23 ENCOUNTER — DOCUMENTATION ONLY (OUTPATIENT)
Dept: FAMILY MEDICINE CLINIC | Age: 53
End: 2023-02-23

## 2023-02-23 NOTE — PROGRESS NOTES
Patient Assistance Forms received and mailed to Patient to be completed.  Time for yearly renewal.   Requested Patient complete all of his parts then bring forms back into us with all the required information

## 2023-02-28 ENCOUNTER — VIRTUAL VISIT (OUTPATIENT)
Dept: FAMILY MEDICINE CLINIC | Age: 53
End: 2023-02-28
Payer: COMMERCIAL

## 2023-02-28 DIAGNOSIS — G47.9 SLEEP DISTURBANCE: ICD-10-CM

## 2023-02-28 DIAGNOSIS — F98.8 ATTENTION DEFICIT DISORDER, UNSPECIFIED HYPERACTIVITY PRESENCE: Primary | ICD-10-CM

## 2023-02-28 DIAGNOSIS — G47.00 INSOMNIA, UNSPECIFIED TYPE: ICD-10-CM

## 2023-02-28 DIAGNOSIS — E66.9 OBESITY, CLASS I, BMI 30-34.9: ICD-10-CM

## 2023-02-28 PROCEDURE — 99213 OFFICE O/P EST LOW 20 MIN: CPT | Performed by: FAMILY MEDICINE

## 2023-02-28 NOTE — PROGRESS NOTES
Dasha Clarke is a 47 y.o. male who was seen by synchronous (real-time) audio-video technology on 2/28/2023. Consent:  Services were provided through a video synchronous discussion virtually to substitute for in-person appointment. He and/or his healthcare decision maker is aware that this patient-initiated Telehealth encounter is a billable service, with coverage as determined by his insurance carrier. He is aware that he may receive a bill and has provided verbal consent to proceed: Yes    I was in the office while conducting this encounter. Subjective:   Dasha Clarke was seen for No chief complaint on file. ADD: Pt takes Vyvanse 40mg in AM and 30mg in PM PRN. He inquired about the insurance coverage of Vyvanse. Insomnia: Pt takes doxepin 10mg nightly. In the past month, he has had interruption in his sleep. He feels palpitations which believes it was caused by his intake of bourbon. This also caused him to have a headache. Pt takes Xanax 1mg nightly as needed for anxiety. Prior to Admission medications    Medication Sig Start Date End Date Taking? Authorizing Provider   ALPRAZolam Cony Townsend) 1 mg tablet Take 1 Tablet by mouth nightly as needed for Anxiety. Max Daily Amount: 1 mg. 1/10/23   Durga Pop MD   lisdexamfetamine (VYVANSE) 30 mg capsule Take 1 Capsule by mouth every morning for 29 days. Max Daily Amount: 30 mg. 1/29/23 2/27/23  Durga Pop MD   lisdexamfetamine (VYVANSE) 30 mg capsule Take 1 Capsule by mouth daily. Max Daily Amount: 30 mg. 12/29/22   Durga Pop MD   lisdexamfetamine (VYVANSE) 30 mg capsule Take 1 Capsule by mouth every evening. Max Daily Amount: 30 mg. Indications: attention deficit disorder with hyperactivity, binge eating disorder 2/27/23   Faiza Emerson MD   doxepin (SINEquan) 10 mg capsule Take 1 Capsule by mouth nightly. 11/28/22   Durga Pop MD   zolpidem (Ambien) 5 mg tablet Take 1 Tablet by mouth nightly as needed for Sleep.  Max Daily Amount: 5 mg. 11/4/22   Alison Pop MD   Lisdexamfetamine (VYVANSE) 40 mg capsule Take 1 Capsule by mouth in the morning. Max Daily Amount: 40 mg. Indications: attention deficit disorder with hyperactivity, binge eating disorder 7/26/22   Korey Pratt MD   tadalafiL (CIALIS) 10 mg tablet Take 1 Tablet by mouth daily as needed for Erectile Dysfunction. 8/6/21   Alison Pop MD   cholecalciferol, vitamin D3, 50 mcg (2,000 unit) tab Take 2,000 Units by mouth daily. Provider, Historical   magnesium 250 mg tab Take  by mouth daily. Provider, Historical     No Known Allergies  Past Medical History:   Diagnosis Date    ADD (attention deficit disorder)     Alcohol use     Colon polyp 2014    One removed    ED (erectile dysfunction)     Liver disease     FATTY liver    Preglaucoma of both eyes      Past Surgical History:   Procedure Laterality Date    HX COLONOSCOPY  2018    Every 5 years    HX HEENT      wisdom    HX ORTHOPAEDIC      knee left     Family History   Problem Relation Age of Onset    Glaucoma Father     Hypertension Father     Heart Disease Father     Stroke Sister      Social History     Tobacco Use    Smoking status: Never    Smokeless tobacco: Never   Substance Use Topics    Alcohol use: Yes     Alcohol/week: 12.0 standard drinks     Types: 8 Glasses of wine, 4 Shots of liquor per week     Comment: Been trying to switch to wine max 20 a week        Review of Systems   Constitutional:  Negative for chills and fever. HENT:  Negative for hearing loss and tinnitus. Eyes:  Negative for blurred vision and double vision. Respiratory:  Negative for cough and shortness of breath. Cardiovascular:  Negative for chest pain and palpitations. Gastrointestinal:  Negative for nausea and vomiting. Genitourinary:  Negative for dysuria and frequency. Musculoskeletal:  Negative for back pain and falls. Skin:  Negative for itching and rash.    Neurological:  Negative for dizziness, loss of consciousness and headaches. Endo/Heme/Allergies: Negative. Psychiatric/Behavioral:  Negative for depression. The patient is not nervous/anxious. PHYSICAL EXAMINATION:  Vital Signs: (As obtained by patient/caregiver at home)  There were no vitals taken for this visit. Constitutional: [x] Appears well-developed and well-nourished [x] No apparent distress      [] Abnormal -     Mental status: [x] Alert and awake  [x] Oriented to person/place/time [x] Able to follow commands    [] Abnormal -     Eyes:   EOM    [x]  Normal    [] Abnormal -   Sclera  [x]  Normal    [] Abnormal -          Discharge [x]  None visible   [] Abnormal -     HENT: [x] Normocephalic, atraumatic  [] Abnormal -   [x] Mouth/Throat: Mucous membranes are moist    External Ears [x] Normal  [] Abnormal -    Neck: [x] No visualized mass [] Abnormal -     Pulmonary/Chest: [x] Respiratory effort normal   [x] No visualized signs of difficulty breathing or respiratory distress        [] Abnormal -      Musculoskeletal:   [x] Normal gait with no signs of ataxia         [x] Normal range of motion of neck        [] Abnormal -     Neurological:        [x] No Facial Asymmetry (Cranial nerve 7 motor function) (limited exam due to video visit)          [x] No gaze palsy        [] Abnormal -          Skin:        [x] No significant exanthematous lesions or discoloration noted on facial skin         [] Abnormal -            Psychiatric:       [x] Normal Affect [] Abnormal -        [x] No Hallucinations    Other pertinent observable physical exam findings:-    Assessment & Plan:   Diagnoses and all orders for this visit:    1. Attention deficit disorder, unspecified hyperactivity presence  Pt continues with Vyvanse 40mg in AM and 30mg in PM PRN. Pt requests a refill of their medication, which I have granted. -     lisdexamfetamine (VYVANSE) 30 mg capsule; Take 1 Capsule by mouth every evening for 29 days.  Max Daily Amount: 30 mg.  - lisdexamfetamine (VYVANSE) 30 mg capsule; Take 1 Capsule by mouth every evening. Max Daily Amount: 30 mg. Indications: attention deficit disorder with hyperactivity, binge eating disorder  -     Lisdexamfetamine (VYVANSE) 40 mg capsule; Take 1 Capsule by mouth daily for 29 days. Max Daily Amount: 40 mg.  -     Lisdexamfetamine (VYVANSE) 40 mg capsule; Take 1 Capsule by mouth daily for 29 days. Max Daily Amount: 40 mg.  -     Lisdexamfetamine (VYVANSE) 40 mg capsule; Take 1 Capsule by mouth daily for 29 days. Max Daily Amount: 40 mg.  -     lisdexamfetamine (VYVANSE) 30 mg capsule; Take 1 Capsule by mouth every evening. Max Daily Amount: 30 mg.    2. Insomnia, unspecified type  I referred him to sleep medicine.  -     SLEEP MEDICINE REFERRAL    3. Sleep disturbance  I referred him to sleep medicine.  -     SLEEP MEDICINE REFERRAL    4. Obesity, Class I, BMI 30-34.9  Pt continues with doxepin 10mg nightly. Pt requests a refill of their medication, which I have granted. -     lisdexamfetamine (VYVANSE) 30 mg capsule; Take 1 Capsule by mouth every evening for 29 days. Max Daily Amount: 30 mg.  -     lisdexamfetamine (VYVANSE) 30 mg capsule; Take 1 Capsule by mouth every evening. Max Daily Amount: 30 mg. Indications: attention deficit disorder with hyperactivity, binge eating disorder  -     Lisdexamfetamine (VYVANSE) 40 mg capsule; Take 1 Capsule by mouth daily for 29 days. Max Daily Amount: 40 mg.  -     Lisdexamfetamine (VYVANSE) 40 mg capsule; Take 1 Capsule by mouth daily for 29 days. Max Daily Amount: 40 mg.  -     Lisdexamfetamine (VYVANSE) 40 mg capsule; Take 1 Capsule by mouth daily for 29 days. Max Daily Amount: 40 mg.  -     lisdexamfetamine (VYVANSE) 30 mg capsule; Take 1 Capsule by mouth every evening. Max Daily Amount: 30 mg. We discussed the expected course, resolution and complications of the diagnosis(es) in detail.   Medication risks, benefits, costs, interactions, and alternatives were discussed as indicated. I advised her to contact the office if her condition worsens, changes or fails to improve as anticipated. She expressed understanding with the diagnosis(es) and plan. Pursuant to the emergency declaration under the [address] and [place] [address] waiver authority and the [employer] Act, this Virtual Visit was conducted, with patient's consent, to reduce the patient's risk of exposure to COVID-19 and provide continuity of care for an established patient. Services were provided through a video synchronous discussion virtually to substitute for in-person clinic visit. Total time spent with the patient 12 minutes, greater than 50% of time spent counseling patient.

## 2023-03-20 DIAGNOSIS — N52.9 ERECTILE DYSFUNCTION, UNSPECIFIED ERECTILE DYSFUNCTION TYPE: Primary | ICD-10-CM

## 2023-03-22 DIAGNOSIS — G47.00 INSOMNIA, UNSPECIFIED TYPE: ICD-10-CM

## 2023-03-23 RX ORDER — DOXEPIN HYDROCHLORIDE 10 MG/1
10 CAPSULE ORAL
Qty: 90 CAPSULE | Refills: 1 | Status: SHIPPED | OUTPATIENT
Start: 2023-03-23

## 2023-03-23 NOTE — TELEPHONE ENCOUNTER
doxepin (SINEquan) 10 mg capsule [[id number]]     Order Details  Dose: 10 mg Route: Oral Frequency: EVERY BEDTIME   Dispense Quantity: 90 Capsule Refills: 1          Sig: Take 1 Capsule by mouth nightly.          Start Date: 11/28/22 End Date: --   Written Date: 11/28/22 Expiration Date: --     Amita Richardson 02/28/2023

## 2023-04-20 DIAGNOSIS — F41.9 ANXIETY: ICD-10-CM

## 2023-04-20 DIAGNOSIS — G47.00 INSOMNIA, UNSPECIFIED TYPE: ICD-10-CM

## 2023-04-21 NOTE — TELEPHONE ENCOUNTER
MD Ernestina Morris request for refills of zolpidem and alprazolam.  States takes zolpidem once in a while to break from doxepin and alprazolam for work anxiety. Check . Qing, Silva    Last Visit: VV 2/28/23 MD Pop  Next Appointment: None  Previous Refill Encounter(s):   Zolpidem 11/4/22 15  Alprazolam 1/10/23 10    Requested Prescriptions     Pending Prescriptions Disp Refills    zolpidem (Ambien) 5 mg tablet 15 Tablet 0     Sig: Take 1 Tablet by mouth nightly as needed for Sleep. Max Daily Amount: 5 mg. ALPRAZolam (XANAX) 1 mg tablet 10 Tablet 0     Sig: Take 1 Tablet by mouth nightly as needed for Anxiety. Max Daily Amount: 1 mg.      For Pharmacy Admin Tracking Only    Program: Medication Refill  Intervention Detail: New Rx: 2, reason: Patient Preference  Time Spent (min): 5

## 2023-04-23 RX ORDER — ALPRAZOLAM 1 MG/1
1 TABLET ORAL
Qty: 10 TABLET | Refills: 0 | Status: SHIPPED | OUTPATIENT
Start: 2023-04-23

## 2023-04-23 RX ORDER — ZOLPIDEM TARTRATE 5 MG/1
5 TABLET ORAL
Qty: 15 TABLET | Refills: 0 | Status: SHIPPED | OUTPATIENT
Start: 2023-04-23

## 2023-06-02 ENCOUNTER — TELEPHONE (OUTPATIENT)
Age: 53
End: 2023-06-02

## 2023-06-02 SDOH — ECONOMIC STABILITY: TRANSPORTATION INSECURITY
IN THE PAST 12 MONTHS, HAS LACK OF TRANSPORTATION KEPT YOU FROM MEETINGS, WORK, OR FROM GETTING THINGS NEEDED FOR DAILY LIVING?: PATIENT DECLINED

## 2023-06-02 SDOH — ECONOMIC STABILITY: HOUSING INSECURITY
IN THE LAST 12 MONTHS, WAS THERE A TIME WHEN YOU DID NOT HAVE A STEADY PLACE TO SLEEP OR SLEPT IN A SHELTER (INCLUDING NOW)?: PATIENT REFUSED

## 2023-06-02 SDOH — ECONOMIC STABILITY: FOOD INSECURITY: WITHIN THE PAST 12 MONTHS, YOU WORRIED THAT YOUR FOOD WOULD RUN OUT BEFORE YOU GOT MONEY TO BUY MORE.: PATIENT DECLINED

## 2023-06-02 SDOH — ECONOMIC STABILITY: FOOD INSECURITY: WITHIN THE PAST 12 MONTHS, THE FOOD YOU BOUGHT JUST DIDN'T LAST AND YOU DIDN'T HAVE MONEY TO GET MORE.: PATIENT DECLINED

## 2023-06-02 SDOH — ECONOMIC STABILITY: INCOME INSECURITY: HOW HARD IS IT FOR YOU TO PAY FOR THE VERY BASICS LIKE FOOD, HOUSING, MEDICAL CARE, AND HEATING?: PATIENT DECLINED

## 2023-06-02 NOTE — TELEPHONE ENCOUNTER
----- Message from Immanuel Vaca sent at 6/2/2023  1:33 PM EDT -----  Subject: Appointment Request    Reason for Call: Established Patient Appointment needed: Routine Physical   Exam    QUESTIONS    Reason for appointment request? No appointments available during search     Additional Information for Provider? Aviva Cervantes needs to have a physical for   work by the 2nd week of Nov. Please call to set an appt up for him. I did   not have any appts.   ---------------------------------------------------------------------------  --------------  Katie OLIVO  [phone]; OK to leave message on voicemail  ---------------------------------------------------------------------------  --------------  SCRIPT ANSWERS  COVID Screen: [phone] [facility]

## 2023-06-02 NOTE — TELEPHONE ENCOUNTER
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 Moreno can order them at the appointment     If he needs a wellness form for work completed he can schedule a nurse visit to come in for vitals to have the form completed. Please call Patient to advise.

## 2023-06-03 ASSESSMENT — ENCOUNTER SYMPTOMS
SHORTNESS OF BREATH: 0
ABDOMINAL PAIN: 0

## 2023-06-05 ENCOUNTER — TELEMEDICINE (OUTPATIENT)
Age: 53
End: 2023-06-05
Payer: COMMERCIAL

## 2023-06-05 DIAGNOSIS — G47.00 INSOMNIA, UNSPECIFIED TYPE: ICD-10-CM

## 2023-06-05 DIAGNOSIS — F98.8 ATTENTION DEFICIT DISORDER (ADD) WITHOUT HYPERACTIVITY: Primary | ICD-10-CM

## 2023-06-05 PROCEDURE — 99213 OFFICE O/P EST LOW 20 MIN: CPT | Performed by: FAMILY MEDICINE

## 2023-06-05 RX ORDER — DOXEPIN HYDROCHLORIDE 3 MG/1
3 TABLET ORAL NIGHTLY
Qty: 30 TABLET | Refills: 5 | Status: SHIPPED | OUTPATIENT
Start: 2023-06-05

## 2023-06-05 ASSESSMENT — ENCOUNTER SYMPTOMS
SHORTNESS OF BREATH: 0
ABDOMINAL PAIN: 0

## 2023-06-05 NOTE — PROGRESS NOTES
Stanton Chavez (:  1970) is a Established patient, presenting virtually for evaluation of the following:    Assessment & Plan   Below is the assessment and plan developed based on review of pertinent history, physical exam, labs, studies, and medications. 1. Attention deficit disorder (ADD) without hyperactivity  Assessment & Plan:   Well-controlled, continue current medications and continue current treatment plan  Orders:  -     Lisdexamfetamine Dimesylate 40 MG CAPS; Take 40 mg by mouth daily for 30 days. Max Daily Amount: 40 mg, Disp-30 capsule, R-0Normal  -     Lisdexamfetamine Dimesylate 40 MG CAPS; Take 40 mg by mouth daily for 30 days. Max Daily Amount: 40 mg, Disp-30 capsule, R-0Normal  -     Lisdexamfetamine Dimesylate 40 MG CAPS; Take 40 mg by mouth daily for 30 days. Max Daily Amount: 40 mg, Disp-30 capsule, R-0Normal  -     [facility]  -     lisdexamfetamine (VYVANSE) 30 MG capsule; Take 1 capsule by mouth Daily with lunch for 30 days. Max Daily Amount: 30 mg, Disp-30 capsule, R-0Normal  -     lisdexamfetamine (VYVANSE) 30 MG capsule; Take 1 capsule by mouth Daily with lunch for 30 days. Max Daily Amount: 30 mg, Disp-30 capsule, R-0Normal  -     lisdexamfetamine (VYVANSE) 30 MG capsule; Take 1 capsule by mouth Daily with lunch for 30 days. Max Daily Amount: 30 mg, Disp-30 capsule, R-0Normal  2. Insomnia, unspecified type  -     doxepin (SILENOR) 3 MG TABS tablet; Take 1 tablet by mouth nightly, Disp-30 tablet, R-5Normal    Return in about 3 months (around 2023) for ADD/ADHD. Subjective   HPI  Patient is coming for follow-up on ADD need refills on his Vyvanse he takes 40 mg in the morning and 30 in the afternoon. He states that this is helped him out of meds    Patient has also requested a sleep study says he is not sleeping well he says that doxepin makes him too sleepy he is asked for a lower dose.     Review of Systems

## 2023-06-08 ENCOUNTER — APPOINTMENT (OUTPATIENT)
Facility: HOSPITAL | Age: 53
End: 2023-06-08
Payer: COMMERCIAL

## 2023-06-08 ENCOUNTER — HOSPITAL ENCOUNTER (EMERGENCY)
Facility: HOSPITAL | Age: 53
Discharge: LEFT BEFORE DISCHARGE | End: 2023-06-08
Attending: EMERGENCY MEDICINE | Admitting: HOSPITALIST
Payer: COMMERCIAL

## 2023-06-08 VITALS
TEMPERATURE: 97.9 F | HEIGHT: 69 IN | BODY MASS INDEX: 30.81 KG/M2 | RESPIRATION RATE: 20 BRPM | DIASTOLIC BLOOD PRESSURE: 95 MMHG | WEIGHT: 208 LBS | HEART RATE: 96 BPM | OXYGEN SATURATION: 97 % | SYSTOLIC BLOOD PRESSURE: 149 MMHG

## 2023-06-08 DIAGNOSIS — R29.898 WEAKNESS OF BOTH LOWER EXTREMITIES: ICD-10-CM

## 2023-06-08 DIAGNOSIS — R27.0 ATAXIA: Primary | ICD-10-CM

## 2023-06-08 LAB
ALBUMIN SERPL-MCNC: 3.9 G/DL (ref 3.5–5)
ALBUMIN/GLOB SERPL: 1.1 (ref 1.1–2.2)
ALP SERPL-CCNC: 78 U/L (ref 45–117)
ALT SERPL-CCNC: 49 U/L (ref 12–78)
ANION GAP SERPL CALC-SCNC: 12 MMOL/L (ref 5–15)
AST SERPL-CCNC: 27 U/L (ref 15–37)
BASOPHILS # BLD: 0 K/UL (ref 0–0.1)
BASOPHILS NFR BLD: 0 % (ref 0–1)
BILIRUB SERPL-MCNC: 0.7 MG/DL (ref 0.2–1)
BUN SERPL-MCNC: 20 MG/DL (ref 6–20)
BUN/CREAT SERPL: 14 (ref 12–20)
CALCIUM SERPL-MCNC: 9.3 MG/DL (ref 8.5–10.1)
CHLORIDE SERPL-SCNC: 102 MMOL/L (ref 97–108)
CO2 SERPL-SCNC: 25 MMOL/L (ref 21–32)
CREAT SERPL-MCNC: 1.39 MG/DL (ref 0.7–1.3)
DIFFERENTIAL METHOD BLD: ABNORMAL
EOSINOPHIL # BLD: 0 K/UL (ref 0–0.4)
EOSINOPHIL NFR BLD: 1 % (ref 0–7)
ERYTHROCYTE [DISTWIDTH] IN BLOOD BY AUTOMATED COUNT: 12.5 % (ref 11.5–14.5)
GLOBULIN SER CALC-MCNC: 3.6 G/DL (ref 2–4)
GLUCOSE SERPL-MCNC: 170 MG/DL (ref 65–100)
HCT VFR BLD AUTO: 47.7 % (ref 36.6–50.3)
HGB BLD-MCNC: 16.4 G/DL (ref 12.1–17)
IMM GRANULOCYTES # BLD AUTO: 0 K/UL (ref 0–0.04)
IMM GRANULOCYTES NFR BLD AUTO: 0 % (ref 0–0.5)
LYMPHOCYTES # BLD: 1.1 K/UL (ref 0.8–3.5)
LYMPHOCYTES NFR BLD: 16 % (ref 12–49)
MAGNESIUM SERPL-MCNC: 2 MG/DL (ref 1.6–2.4)
MCH RBC QN AUTO: 30.2 PG (ref 26–34)
MCHC RBC AUTO-ENTMCNC: 34.4 G/DL (ref 30–36.5)
MCV RBC AUTO: 87.8 FL (ref 80–99)
MONOCYTES # BLD: 0.4 K/UL (ref 0–1)
MONOCYTES NFR BLD: 6 % (ref 5–13)
NEUTS SEG # BLD: 5.2 K/UL (ref 1.8–8)
NEUTS SEG NFR BLD: 77 % (ref 32–75)
NRBC # BLD: 0 K/UL (ref 0–0.01)
NRBC BLD-RTO: 0 PER 100 WBC
PLATELET # BLD AUTO: 194 K/UL (ref 150–400)
PMV BLD AUTO: 11.1 FL (ref 8.9–12.9)
POTASSIUM SERPL-SCNC: 3.4 MMOL/L (ref 3.5–5.1)
PROT SERPL-MCNC: 7.5 G/DL (ref 6.4–8.2)
RBC # BLD AUTO: 5.43 M/UL (ref 4.1–5.7)
SODIUM SERPL-SCNC: 139 MMOL/L (ref 136–145)
TSH SERPL DL<=0.05 MIU/L-ACNC: 0.98 UIU/ML (ref 0.36–3.74)
WBC # BLD AUTO: 6.7 K/UL (ref 4.1–11.1)

## 2023-06-08 PROCEDURE — 93005 ELECTROCARDIOGRAM TRACING: CPT | Performed by: EMERGENCY MEDICINE

## 2023-06-08 PROCEDURE — 70551 MRI BRAIN STEM W/O DYE: CPT

## 2023-06-08 PROCEDURE — 2060000000 HC ICU INTERMEDIATE R&B

## 2023-06-08 PROCEDURE — 70450 CT HEAD/BRAIN W/O DYE: CPT

## 2023-06-08 PROCEDURE — 36415 COLL VENOUS BLD VENIPUNCTURE: CPT

## 2023-06-08 PROCEDURE — 83735 ASSAY OF MAGNESIUM: CPT

## 2023-06-08 PROCEDURE — 85025 COMPLETE CBC W/AUTO DIFF WBC: CPT

## 2023-06-08 PROCEDURE — 84443 ASSAY THYROID STIM HORMONE: CPT

## 2023-06-08 PROCEDURE — 99285 EMERGENCY DEPT VISIT HI MDM: CPT

## 2023-06-08 PROCEDURE — 82607 VITAMIN B-12: CPT

## 2023-06-08 PROCEDURE — 80053 COMPREHEN METABOLIC PANEL: CPT

## 2023-06-08 PROCEDURE — 72141 MRI NECK SPINE W/O DYE: CPT

## 2023-06-08 NOTE — ED TRIAGE NOTES
Pt complains of \"being out of it for 4-5 days\". Pt complains of bilateral leg weakness and numbness x 1 day. Pt reports \"minor confusion and staring at email, completely not normal for me. \". Denies health hx.

## 2023-06-08 NOTE — ED NOTES
Patient is back from MRI, ambulatory, no dizziness or balance issues noted.       Tashia Lake RN  06/08/23 [id number]

## 2023-06-09 LAB
EKG ATRIAL RATE: 90 BPM
EKG DIAGNOSIS: NORMAL
EKG P AXIS: 53 DEGREES
EKG P-R INTERVAL: 158 MS
EKG Q-T INTERVAL: 376 MS
EKG QRS DURATION: 104 MS
EKG QTC CALCULATION (BAZETT): 459 MS
EKG R AXIS: 6 DEGREES
EKG T AXIS: 32 DEGREES
EKG VENTRICULAR RATE: 90 BPM
VIT B12 SERPL-MCNC: 422 PG/ML (ref 193–986)

## 2023-06-09 PROCEDURE — 93010 ELECTROCARDIOGRAM REPORT: CPT | Performed by: INTERNAL MEDICINE

## 2023-06-09 NOTE — ED NOTES
Pt requesting to leave. Dr. Reese Kennebec at bedside to talk with patient. Pt signing [facility] paperwork with this RN and Dr. Reese Kennebec.       Juan Mehta RN  06/08/23 2046

## 2023-06-09 NOTE — DISCHARGE INSTRUCTIONS
You have chosen to sign out AMA. Now that you declined admission, you should call the number provided to follow-up with neurology.   Return immediately with any worsening symptoms

## 2023-06-20 DIAGNOSIS — F98.8 ATTENTION DEFICIT DISORDER (ADD) WITHOUT HYPERACTIVITY: ICD-10-CM

## 2023-06-21 ENCOUNTER — TELEPHONE (OUTPATIENT)
Age: 53
End: 2023-06-21

## 2023-06-21 NOTE — TELEPHONE ENCOUNTER
[facility] - Pediatric Acute Care  Pediatric Hospital Medicine  Discharge Summary      Patient Name: Sunita Lara  MRN: [id number]  Admission Date: 9/15/2022  Hospital Length of Stay: 0 days  Discharge Date and Time:  09/16/2022 12:33 PM  Discharging Provider: Kae Kelly DO  Primary Care Provider: Yessi Acosta MD    Reason for Admission: Abdominal Pain    HPI:   Sunita Lara is a 17 y/o female with a pmh of recurrent pharyngitis, snoring presenting with significant pain with swallowing, decreased PO intake 2/2 to pain, primarily L sided abdominal pain in the setting of a diagnosis of infectious mononucleosis.    Pt has had a sore throat + pain with swallowing, as well as intermittent NBNB vomiting x1 week. 3 days ago developed L sided abdominal pain in both LUQ and LLQ. 3 days ago, she also developed new onset headaches described as sharp, pain in the right temple with pressure behind both eyes - these headaches resolve with tylenol and ibuprofen. Also has had intermittent fevers with a tmax of 102F, most recent fever being 9/14/22 at 101.5F. Endorses body aches and weakness x1 week. Denies diarrhea, constipation, other respiratory symptoms.     In the ED, pt received toradol 15mg IV @ 2138, dexamethasone 12mg IV 2138, IVF.   CT showed NO hepatosplenomegaly. UA- no evidence of UTI.  CMP: alkphos 174, ,  ... CBC: WBC 16.41      Psh: appendix  Meds:  welbutrin, polytussin, fluticason, albuterol prn    Family hx: mom has ulcerative colitis that was diagnosed at age 20, with symptoms beginning in her late teen years and currently requires a colostomy bag. Mom and Sunita state that sunita has constant bloating and burning abdominal pain with the majority of her meals (no matter what she eats). She does not endorse bloody stools or emesis, but they would like a GI consult if possible for this issue.      * No surgery found *      Indwelling Lines/Drains at time of discharge:   Lines/Drains/Airways      Lov 06/05/2023    DR. Moreno please see his ER visit befor refilling meds    T. None                 Hospital Course: ED course   Presents to the ED [facility]..... CBC revealed increased WBC (16.41), CMP revealed transminitis with elevated AST/ALT these were trended fro 3 days in patient and had a downward trend.  Urine pregnancy was negative, COVID-19 test negative.  Ct abdomen pelvis w/ contrasts revealed Heterogeneous enhancement of the renal parenchyma, however UA was WNL.    Pediatric floor course  CMP was trended on the floor due to prior transaminitis observed; on the day of discharge the liver enzyme values were downward trending. Rapid Strep antigen was negative, Monospot test was positive. Gonorrhea culture is still pending on discharge, however the clinical presentation was not alarming for gonorrhea pharyngitis; there was no exudate or erythema of the tonsils or oropharynx notes on PE. HIV test ordered was negative. On physical exam, patient had a tonsil grading of 3+ bilaterally on admission, however on the day of discharge the tonsils have decreased in size  right (+1) Left (+2).  Due to persistent inflammation she was administered albuterol inhaler PRN during admission which helped relieve symptoms. She was doing better clinically on the day of discharge with complaint of LUQ pain and noted splenomegaly. She was instructed to avoid contact sports and informed of the dangers of mono- associated splenomegaly. She was discharge on 400mg Ibuprofen with instructions to follow up with PCP on Monday.        Physical Exam  Vitals reviewed.   Constitutional:       General: She is not in acute distress; appears comfortable.      Appearance: Normal appearance. She is normal weight. She is not ill-appearing.   HENT:      Head: Normocephalic.      Right Ear: External ear normal.      Left Ear: External ear normal.      Nose: Nose normal.      Mouth/Throat:      Mouth: Mucous membranes are moist.     Tonsils: 1+ on the right. 2+ on the left.   Eyes:      Extraocular Movements: Extraocular movements  intact.      Conjunctiva/sclera: Conjunctivae normal.      Pupils: Pupils are equal, round, and reactive to light.   Cardiovascular:      Rate and Rhythm: Normal rate and regular rhythm.      Pulses: Normal pulses.      Heart sounds: Normal heart sounds.   Pulmonary:      Effort: Pulmonary effort is normal.      Breath sounds: Normal breath sounds.   Abdominal:      General: Abdomen is flat. Bowel sounds are normal. There is no distension.      Palpations: Abdomen is soft. There is no hepatomegaly, splenomegaly or mass.      Tenderness: There is abdominal tenderness LUQ; Splenomegaly noted.   Musculoskeletal:         General: No swelling.      Cervical back: Normal range of motion. No rigidity.   Skin:     General: Skin is warm.      Capillary Refill: Capillary refill takes less than 2 seconds.   Neurological:      General: No focal deficit present.      Mental Status: She is alert.   Psychiatric:         Mood and Affect: Mood normal.         Behavior: Behavior normal.    Goals of Care Treatment Preferences:  Code Status: Full Code      Consults:     Significant Labs:  Recent Results (from the past 96 hour(s))   Heterophile Ab Screen    Collection Time: 09/13/22  6:56 AM   Result Value Ref Range    Monospot Positive (A) Negative   Pregnancy, urine rapid    Collection Time: 09/14/22  9:10 PM   Result Value Ref Range    Preg Test, Ur Negative    COVID-19 Rapid Screening    Collection Time: 09/14/22  9:10 PM   Result Value Ref Range    SARS-CoV-2 RNA, Amplification, Qual Negative Negative   CBC W/ AUTO DIFFERENTIAL    Collection Time: 09/14/22  9:17 PM   Result Value Ref Range    WBC 16.41 (H) 4.50 - 13.50 K/uL    RBC 4.24 4.10 - 5.10 M/uL    Hemoglobin 12.3 12.0 - 16.0 g/dL    Hematocrit 36.2 36.0 - 46.0 %    MCV 85 78 - 98 fL    MCH 29.0 25.0 - 35.0 pg    MCHC 34.0 31.0 - 37.0 g/dL    RDW 13.1 11.5 - 14.5 %    Platelets 211 150 - 450 K/uL    MPV 9.8 9.2 - 12.9 fL    Immature Granulocytes CANCELED 0.0 - 0.5 %     Immature Grans (Abs) CANCELED 0.00 - 0.04 K/uL    Lymph # CANCELED 1.2 - 5.8 K/uL    Mono # CANCELED 0.2 - 0.8 K/uL    Eos # CANCELED 0.0 - 0.4 K/uL    Baso # CANCELED 0.01 - 0.05 K/uL    nRBC 0 0 /100 WBC    Gran % 42.0 40.0 - 59.0 %    Lymph % 50.0 (H) 27.0 - 45.0 %    Mono % 3.0 (L) 4.1 - 12.3 %    Eosinophil % 0.0 0.0 - 4.0 %    Basophil % 0.0 0.0 - 0.7 %    Bands 5.0 %    Differential Method Manual    Comp. Metabolic Panel    Collection Time: 09/14/22  9:17 PM   Result Value Ref Range    Sodium 139 136 - 145 mmol/L    Potassium 4.1 3.5 - 5.1 mmol/L    Chloride 102 95 - 110 mmol/L    CO2 26 23 - 29 mmol/L    Glucose 98 70 - 110 mg/dL    BUN 11 7 - 17 mg/dL    Creatinine 0.92 0.50 - 1.40 mg/dL    Calcium 9.0 8.7 - 10.5 mg/dL    Total Protein 8.2 6.0 - 8.4 g/dL    Albumin 4.4 3.2 - 4.7 g/dL    Total Bilirubin 1.2 (H) 0.1 - 1.0 mg/dL    Alkaline Phosphatase 174 (H) 50 - 130 U/L     (H) 15 - 46 U/L     (H) 10 - 44 U/L    Anion Gap 11 8 - 16 mmol/L    eGFR SEE COMMENT >60 mL/min/1.73 m^2   Pathologist Review    Collection Time: 09/14/22  9:17 PM   Result Value Ref Range    Pathologist Review Peripheral Smear REVIEWED    Urinalysis, Reflex to Urine Culture Urine, Clean Catch    Collection Time: 09/14/22 10:50 PM    Specimen: Urine   Result Value Ref Range    Specimen UA Urine, Clean Catch     Color, UA Yellow Yellow, Straw, [place]    Appearance, UA Clear Clear    pH, UA 7.0 5.0 - 8.0    Specific Gravity, UA 1.015 1.005 - 1.030    Protein, UA Negative Negative    Glucose, UA Negative Negative    Ketones, UA 1+ (A) Negative    Bilirubin (UA) 1+ (A) Negative    Occult Blood UA Trace (A) Negative    Nitrite, UA Negative Negative    Urobilinogen, UA 2.0-3.0 (A) <2.0 EU/dL    Leukocytes, UA 1+ (A) Negative   Urinalysis Microscopic    Collection Time: 09/14/22 10:50 PM   Result Value Ref Range    RBC, UA 1 0 - 4 /hpf    WBC, UA 3 0 - 5 /hpf    Squam Epithel, UA 3 /hpf    Microscopic Comment SEE COMMENT     Comprehensive metabolic panel    Collection Time: 09/15/22  9:21 AM   Result Value Ref Range    Sodium 137 136 - 145 mmol/L    Potassium 4.5 3.5 - 5.1 mmol/L    Chloride 109 95 - 110 mmol/L    CO2 22 (L) 23 - 29 mmol/L    Glucose 141 (H) 70 - 110 mg/dL    BUN 8 5 - 18 mg/dL    Creatinine 0.7 0.5 - 1.4 mg/dL    Calcium 8.7 8.7 - 10.5 mg/dL    Total Protein 7.0 6.0 - 8.4 g/dL    Albumin 3.1 (L) 3.2 - 4.7 g/dL    Total Bilirubin 0.8 0.1 - 1.0 mg/dL    Alkaline Phosphatase 128 (H) 48 - 95 U/L     (H) 10 - 40 U/L     (H) 10 - 44 U/L    Anion Gap 6 (L) 8 - 16 mmol/L    eGFR SEE COMMENT >60 mL/min/1.73 m^2   Bilirubin, direct    Collection Time: 09/15/22  9:21 AM   Result Value Ref Range    Bilirubin, Direct 0.4 (H) 0.1 - 0.3 mg/dL   Gamma GT    Collection Time: 09/15/22  9:21 AM   Result Value Ref Range     (H) 8 - 55 U/L   HIV 1/2 Ag/Ab (4th Gen)    Collection Time: 09/15/22  9:21 AM   Result Value Ref Range    HIV 1/2 Ag/Ab Non-reactive Non-reactive   Strep A culture, throat    Collection Time: 09/15/22 11:41 AM    Specimen: Throat   Result Value Ref Range    Strep A Culture No significant growth    Comprehensive metabolic panel    Collection Time: 09/16/22  4:39 AM   Result Value Ref Range    Sodium 135 (L) 136 - 145 mmol/L    Potassium 4.9 3.5 - 5.1 mmol/L    Chloride 110 95 - 110 mmol/L    CO2 17 (L) 23 - 29 mmol/L    Glucose 137 (H) 70 - 110 mg/dL    BUN 4 (L) 5 - 18 mg/dL    Creatinine 0.7 0.5 - 1.4 mg/dL    Calcium 8.5 (L) 8.7 - 10.5 mg/dL    Total Protein 6.8 6.0 - 8.4 g/dL    Albumin 3.1 (L) 3.2 - 4.7 g/dL    Total Bilirubin 0.6 0.1 - 1.0 mg/dL    Alkaline Phosphatase 113 (H) 48 - 95 U/L     (H) 10 - 40 U/L     (H) 10 - 44 U/L    Anion Gap 8 8 - 16 mmol/L    eGFR SEE COMMENT >60 mL/min/1.73 m^2       Significant Imaging:   No orders to display         Pending Diagnostic Studies:     Procedure Component Value Units Date/Time    RPR [[id number]] Collected: 09/15/22 0921    Order  Status: Sent Lab Status: In process Updated: 09/15/22 0936    Specimen: Blood           Final Active Diagnoses:    Diagnosis Date Noted POA    PRINCIPAL PROBLEM:  Impaired swallowing associated with throat pain [R13.10, R07.0] 09/15/2022 Yes    Acute tonsillitis due to infectious mononucleosis [J03.80, B27.90] 09/15/2022 Yes    Dehydration [E86.0] 09/15/2022 Yes      Problems Resolved During this Admission:        Discharged Condition: stable    Disposition: Home or Self Care    Follow Up:   Follow-up Information     Yessi Acosta MD Follow up.    Specialty: Pediatrics  Contact information:  [address]  [facility] [address]  [phone]             [facility] [facility] up.    Specialty: Pediatric Gastroenterology  Why: Gastroenterology: Office will call you with appointment instructions  Contact information:  [address]  [facility] [address] [phone]  Additional information:  [facility]   Please park in surface lot and check in on 1st floor                     Patient Instructions:      Ambulatory referral/consult to Pediatric Gastroenterology   Standing Status: Future   Referral Priority: Routine Referral Type: Consultation   Referral Reason: Specialty Services Required   Requested Specialty: Pediatric Gastroenterology   Number of Visits Requested: 1     Diet Pediatric     Notify your health care provider if you experience any of the following:  temperature >100.4     Notify your health care provider if you experience any of the following:  persistent nausea and vomiting or diarrhea     Notify your health care provider if you experience any of the following:  severe uncontrolled pain     Notify your health care provider if you experience any of the following:  difficulty breathing or increased cough     Activity as tolerated     Medications:  Reconciled Home Medications:      Medication List      CONTINUE  taking these medications    buPROPion 100 MG tablet  Commonly known as: WELLBUTRIN  Take 50 mg by mouth once daily.     ibuprofen 400 MG tablet  Commonly known as: ADVIL,MOTRIN  Take 400 mg by mouth every 4 (four) hours.     levonorgestrel-ethinyl estradiol 0.1-20 mg-mcg per tablet  Commonly known as: AVIANE,ALESSE,LESSINA  Take 1 tablet by mouth once daily.        STOP taking these medications    albuterol 90 mcg/actuation inhaler  Commonly known as: PROVENTIL/VENTOLIN HFA             Kae Kelly DO  Pediatric Hospital Medicine  [facility]

## 2023-06-22 ENCOUNTER — TELEPHONE (OUTPATIENT)
Age: 53
End: 2023-06-22

## 2023-06-22 NOTE — TELEPHONE ENCOUNTER
Patient called would like for the nurse to give him a call back has questions about  vyvanse. Going out of town need his medication to day. Been trying to get medication for two weeks.     Requesting a call back today    Best call back [phone]

## 2023-08-04 DIAGNOSIS — F51.01 PRIMARY INSOMNIA: Primary | ICD-10-CM

## 2023-08-08 RX ORDER — ZOLPIDEM TARTRATE 5 MG/1
5 TABLET ORAL NIGHTLY PRN
Qty: 15 TABLET | Refills: 0 | Status: SHIPPED | OUTPATIENT
Start: 2023-08-08 | End: 2023-11-06

## 2023-09-05 ENCOUNTER — OFFICE VISIT (OUTPATIENT)
Age: 53
End: 2023-09-05
Payer: COMMERCIAL

## 2023-09-05 ENCOUNTER — TELEPHONE (OUTPATIENT)
Age: 53
End: 2023-09-05

## 2023-09-05 DIAGNOSIS — F98.8 ATTENTION DEFICIT DISORDER (ADD) WITHOUT HYPERACTIVITY: ICD-10-CM

## 2023-09-05 PROCEDURE — 99213 OFFICE O/P EST LOW 20 MIN: CPT | Performed by: FAMILY MEDICINE

## 2023-09-05 RX ORDER — GUANFACINE 1 MG/1
1 TABLET, EXTENDED RELEASE ORAL NIGHTLY
Qty: 30 TABLET | Refills: 0 | Status: SHIPPED | OUTPATIENT
Start: 2023-09-05

## 2023-09-05 SDOH — ECONOMIC STABILITY: FOOD INSECURITY: WITHIN THE PAST 12 MONTHS, THE FOOD YOU BOUGHT JUST DIDN'T LAST AND YOU DIDN'T HAVE MONEY TO GET MORE.: NEVER TRUE

## 2023-09-05 SDOH — ECONOMIC STABILITY: HOUSING INSECURITY
IN THE LAST 12 MONTHS, WAS THERE A TIME WHEN YOU DID NOT HAVE A STEADY PLACE TO SLEEP OR SLEPT IN A SHELTER (INCLUDING NOW)?: NO

## 2023-09-05 SDOH — ECONOMIC STABILITY: FOOD INSECURITY: WITHIN THE PAST 12 MONTHS, YOU WORRIED THAT YOUR FOOD WOULD RUN OUT BEFORE YOU GOT MONEY TO BUY MORE.: NEVER TRUE

## 2023-09-05 SDOH — ECONOMIC STABILITY: INCOME INSECURITY: HOW HARD IS IT FOR YOU TO PAY FOR THE VERY BASICS LIKE FOOD, HOUSING, MEDICAL CARE, AND HEATING?: NOT HARD AT ALL

## 2023-09-05 ASSESSMENT — PATIENT HEALTH QUESTIONNAIRE - PHQ9
SUM OF ALL RESPONSES TO PHQ QUESTIONS 1-9: 0
SUM OF ALL RESPONSES TO PHQ QUESTIONS 1-9: 0
2. FEELING DOWN, DEPRESSED OR HOPELESS: 0
SUM OF ALL RESPONSES TO PHQ QUESTIONS 1-9: 0
SUM OF ALL RESPONSES TO PHQ9 QUESTIONS 1 & 2: 0
SUM OF ALL RESPONSES TO PHQ QUESTIONS 1-9: 0
1. LITTLE INTEREST OR PLEASURE IN DOING THINGS: 0

## 2023-09-05 ASSESSMENT — ENCOUNTER SYMPTOMS
SHORTNESS OF BREATH: 0
VOMITING: 0
COUGH: 0
NAUSEA: 0
BACK PAIN: 0

## 2023-09-05 NOTE — TELEPHONE ENCOUNTER
----- Message from Heather Bowling MD sent at 9/5/2023  9:48 AM EDT -----  Regarding: 3 month in office appt please  Pt needs a 3 month in office appt please

## 2023-09-05 NOTE — PROGRESS NOTES
Chief Complaint   Patient presents with    ADD    Follow-up Chronic Condition     1. \"Have you been to the ER, urgent care clinic since your last visit? Hospitalized since your last visit?\"     2. \"Have you seen or consulted any other health care providers outside of the [address] [place] [address] since your last visit? \"      Needs urine drug screen and contract
atraumatic  [] Abnormal -   [x] Mouth/Throat: Mucous membranes are moist    External Ears [x] Normal  [] Abnormal -    Neck: [x] No visualized mass [] Abnormal -     Pulmonary/Chest: [x] Respiratory effort normal   [x] No visualized signs of difficulty breathing or respiratory distress        [] Abnormal -      Musculoskeletal:   [x] Normal gait with no signs of ataxia         [x] Normal range of motion of neck        [] Abnormal -     Neurological:        [x] No Facial Asymmetry (Cranial nerve 7 motor function) (limited exam due to video visit)          [x] No gaze palsy        [] Abnormal -          Skin:        [x] No significant exanthematous lesions or discoloration noted on facial skin         [] Abnormal -            Psychiatric:       [x] Normal Affect [] Abnormal -        [x] No Hallucinations    Other pertinent observable physical exam findings:-    Assessment & Plan:   Bula Halsted was seen today for add and follow-up chronic condition. Diagnoses and all orders for this visit:    Attention deficit disorder (ADD) without hyperactivity  We discussed that he is at the highest dose with the Vyvanse. I prescribed Intuniv 1mg daily to take in addition to the [facility]. I advised him to start with a half tablet of the Intuniv to see how he does. If he feels over stimulated he will let me know. Potential side effects were discussed. He will continue with his current dose of Vyvanse, which I refilled today. The Prescription Monitoring Program has been reviewed for recent activity regarding controlled substances for this patient. -     Lisdexamfetamine Dimesylate 40 MG CAPS; Take 40 mg by mouth daily for 30 days. Max Daily Amount: 40 mg  -     Lisdexamfetamine Dimesylate 40 MG CAPS; Take 40 mg by mouth daily for 30 days. Max Daily Amount: 40 mg  -     Lisdexamfetamine Dimesylate 40 MG CAPS; Take 40 mg by mouth daily for 30 days. Max Daily Amount: 40 mg  -     lisdexamfetamine (VYVANSE) 30 MG capsule;  Take 1 capsule by

## 2023-09-20 ENCOUNTER — PATIENT MESSAGE (OUTPATIENT)
Age: 53
End: 2023-09-20

## 2023-10-09 DIAGNOSIS — F98.8 ATTENTION DEFICIT DISORDER (ADD) WITHOUT HYPERACTIVITY: ICD-10-CM

## 2023-10-09 RX ORDER — GUANFACINE 1 MG/1
1 TABLET, EXTENDED RELEASE ORAL
Qty: 30 TABLET | Refills: 0 | Status: SHIPPED | OUTPATIENT
Start: 2023-10-09

## 2023-10-20 DIAGNOSIS — N52.9 ERECTILE DYSFUNCTION, UNSPECIFIED ERECTILE DYSFUNCTION TYPE: Primary | ICD-10-CM

## 2023-11-02 DIAGNOSIS — F51.01 PRIMARY INSOMNIA: ICD-10-CM

## 2023-11-04 RX ORDER — ZOLPIDEM TARTRATE 5 MG/1
5 TABLET ORAL NIGHTLY PRN
Qty: 15 TABLET | Refills: 0 | Status: SHIPPED | OUTPATIENT
Start: 2023-11-04 | End: 2024-02-02

## 2023-11-08 DIAGNOSIS — F98.8 ATTENTION DEFICIT DISORDER (ADD) WITHOUT HYPERACTIVITY: ICD-10-CM

## 2023-11-09 ENCOUNTER — PATIENT MESSAGE (OUTPATIENT)
Age: 53
End: 2023-11-09

## 2023-11-10 DIAGNOSIS — Z12.5 SCREENING PSA (PROSTATE SPECIFIC ANTIGEN): ICD-10-CM

## 2023-11-10 DIAGNOSIS — R73.09 ELEVATED GLUCOSE: Primary | ICD-10-CM

## 2023-11-10 RX ORDER — GUANFACINE 1 MG/1
1 TABLET, EXTENDED RELEASE ORAL
Qty: 30 TABLET | Refills: 0 | Status: SHIPPED | OUTPATIENT
Start: 2023-11-10

## 2023-11-10 NOTE — TELEPHONE ENCOUNTER
From: Neo Watts  To: Dr. Jamila Solo: 11/9/2023 7:50 AM EST  Subject: PSA and A1c    Hi Dr Tracey Pham I am going for Testosterone test next week can you add PSA since I did not get my physical this year and it has been trending up as well a1c test as my glucose is always high.  The rest we can do next year at my physical .

## 2023-12-05 ENCOUNTER — OFFICE VISIT (OUTPATIENT)
Age: 53
End: 2023-12-05
Payer: COMMERCIAL

## 2023-12-05 VITALS
BODY MASS INDEX: 31.84 KG/M2 | HEIGHT: 69 IN | HEART RATE: 73 BPM | RESPIRATION RATE: 16 BRPM | WEIGHT: 215 LBS | SYSTOLIC BLOOD PRESSURE: 118 MMHG | DIASTOLIC BLOOD PRESSURE: 74 MMHG | TEMPERATURE: 98.6 F | OXYGEN SATURATION: 97 %

## 2023-12-05 DIAGNOSIS — Z51.81 MEDICATION MONITORING ENCOUNTER: ICD-10-CM

## 2023-12-05 DIAGNOSIS — F98.8 ATTENTION DEFICIT DISORDER (ADD) WITHOUT HYPERACTIVITY: Primary | ICD-10-CM

## 2023-12-05 DIAGNOSIS — R53.83 MALAISE AND FATIGUE: ICD-10-CM

## 2023-12-05 DIAGNOSIS — Z12.5 SCREENING PSA (PROSTATE SPECIFIC ANTIGEN): ICD-10-CM

## 2023-12-05 DIAGNOSIS — R73.09 ELEVATED GLUCOSE: ICD-10-CM

## 2023-12-05 DIAGNOSIS — Z11.4 SCREENING FOR HIV WITHOUT PRESENCE OF RISK FACTORS: ICD-10-CM

## 2023-12-05 DIAGNOSIS — R53.81 MALAISE AND FATIGUE: ICD-10-CM

## 2023-12-05 PROCEDURE — 99214 OFFICE O/P EST MOD 30 MIN: CPT | Performed by: FAMILY MEDICINE

## 2023-12-05 RX ORDER — LISDEXAMFETAMINE DIMESYLATE CAPSULES 40 MG/1
40 CAPSULE ORAL
Qty: 30 CAPSULE | Refills: 0 | Status: SHIPPED | OUTPATIENT
Start: 2023-12-05 | End: 2024-01-04

## 2023-12-05 RX ORDER — LISDEXAMFETAMINE DIMESYLATE CAPSULES 30 MG/1
30 CAPSULE ORAL
Qty: 30 CAPSULE | Refills: 0 | Status: SHIPPED | OUTPATIENT
Start: 2023-12-05 | End: 2024-01-04

## 2023-12-05 SDOH — ECONOMIC STABILITY: INCOME INSECURITY: HOW HARD IS IT FOR YOU TO PAY FOR THE VERY BASICS LIKE FOOD, HOUSING, MEDICAL CARE, AND HEATING?: NOT HARD AT ALL

## 2023-12-05 SDOH — ECONOMIC STABILITY: FOOD INSECURITY: WITHIN THE PAST 12 MONTHS, YOU WORRIED THAT YOUR FOOD WOULD RUN OUT BEFORE YOU GOT MONEY TO BUY MORE.: NEVER TRUE

## 2023-12-05 SDOH — ECONOMIC STABILITY: FOOD INSECURITY: WITHIN THE PAST 12 MONTHS, THE FOOD YOU BOUGHT JUST DIDN'T LAST AND YOU DIDN'T HAVE MONEY TO GET MORE.: NEVER TRUE

## 2023-12-05 ASSESSMENT — ENCOUNTER SYMPTOMS
BACK PAIN: 0
NAUSEA: 0
SHORTNESS OF BREATH: 0
COUGH: 0
VOMITING: 0

## 2023-12-05 ASSESSMENT — PATIENT HEALTH QUESTIONNAIRE - PHQ9
SUM OF ALL RESPONSES TO PHQ QUESTIONS 1-9: 0
SUM OF ALL RESPONSES TO PHQ QUESTIONS 1-9: 0
SUM OF ALL RESPONSES TO PHQ9 QUESTIONS 1 & 2: 0
1. LITTLE INTEREST OR PLEASURE IN DOING THINGS: 0
SUM OF ALL RESPONSES TO PHQ QUESTIONS 1-9: 0
SUM OF ALL RESPONSES TO PHQ QUESTIONS 1-9: 0
2. FEELING DOWN, DEPRESSED OR HOPELESS: 0

## 2023-12-05 NOTE — PROGRESS NOTES
1. \"Have you been to the ER, urgent care clinic since your last visit? Hospitalized since your last visit? \" no    2. \"Have you seen or consulted any other health care providers outside of the [address] [place] [address] since your last visit? \"  no     Needs UDS  Needs controlled substance agreement   Needs  check     Colonoscopy ? ?  Next week

## 2023-12-06 LAB
ALBUMIN SERPL-MCNC: 3.8 G/DL (ref 3.5–5)
ALBUMIN/GLOB SERPL: 1.2 (ref 1.1–2.2)
ALP SERPL-CCNC: 74 U/L (ref 45–117)
ALT SERPL-CCNC: 41 U/L (ref 12–78)
ANION GAP SERPL CALC-SCNC: 4 MMOL/L (ref 5–15)
AST SERPL-CCNC: 26 U/L (ref 15–37)
BILIRUB SERPL-MCNC: 0.5 MG/DL (ref 0.2–1)
BUN SERPL-MCNC: 23 MG/DL (ref 6–20)
BUN/CREAT SERPL: 22 (ref 12–20)
CALCIUM SERPL-MCNC: 8.6 MG/DL (ref 8.5–10.1)
CHLORIDE SERPL-SCNC: 106 MMOL/L (ref 97–108)
CO2 SERPL-SCNC: 29 MMOL/L (ref 21–32)
CREAT SERPL-MCNC: 1.06 MG/DL (ref 0.7–1.3)
EST. AVERAGE GLUCOSE BLD GHB EST-MCNC: 105 MG/DL
GLOBULIN SER CALC-MCNC: 3.2 G/DL (ref 2–4)
GLUCOSE SERPL-MCNC: 102 MG/DL (ref 65–100)
HBA1C MFR BLD: 5.3 % (ref 4–5.6)
POTASSIUM SERPL-SCNC: 4.4 MMOL/L (ref 3.5–5.1)
PROT SERPL-MCNC: 7 G/DL (ref 6.4–8.2)
PSA SERPL-MCNC: 1.9 NG/ML (ref 0.01–4)
SODIUM SERPL-SCNC: 139 MMOL/L (ref 136–145)
TSH SERPL DL<=0.05 MIU/L-ACNC: 1.11 UIU/ML (ref 0.36–3.74)

## 2023-12-07 DIAGNOSIS — F98.8 ATTENTION DEFICIT DISORDER (ADD) WITHOUT HYPERACTIVITY: ICD-10-CM

## 2023-12-08 DIAGNOSIS — F98.8 ATTENTION DEFICIT DISORDER (ADD) WITHOUT HYPERACTIVITY: ICD-10-CM

## 2023-12-08 LAB
DRUGS UR: NORMAL
TESTOST FREE SERPL-MCNC: 8.4 PG/ML (ref 7.2–24)
TESTOST SERPL-MCNC: 388 NG/DL (ref 264–916)

## 2023-12-12 DIAGNOSIS — F98.8 ATTENTION DEFICIT DISORDER (ADD) WITHOUT HYPERACTIVITY: ICD-10-CM

## 2023-12-13 RX ORDER — GUANFACINE 1 MG/1
1 TABLET, EXTENDED RELEASE ORAL
Qty: 30 TABLET | Refills: 0 | OUTPATIENT
Start: 2023-12-13

## 2023-12-13 RX ORDER — GUANFACINE 1 MG/1
1 TABLET, EXTENDED RELEASE ORAL DAILY
Qty: 30 TABLET | Refills: 2 | Status: SHIPPED | OUTPATIENT
Start: 2023-12-13

## 2023-12-13 RX ORDER — GUANFACINE 1 MG/1
1 TABLET, EXTENDED RELEASE ORAL
Qty: 90 TABLET | Refills: 1 | Status: SHIPPED | OUTPATIENT
Start: 2023-12-13

## 2023-12-14 DIAGNOSIS — F51.01 PRIMARY INSOMNIA: ICD-10-CM

## 2023-12-14 NOTE — TELEPHONE ENCOUNTER
PCP: Tristian Moreno MD    Last appt: 12/5/2023   Future Appointments   Date Time Provider Department Center   1/30/2024 10:20 AM Cindy Duong PA [facility] AMB   4/9/2024  1:30 PM Tristian Moreno MD [facility] AMB       Requested Prescriptions     Pending Prescriptions Disp Refills    zolpidem (AMBIEN) 5 MG tablet 15 tablet 0     Sig: Take 1 tablet by mouth nightly as needed for Sleep for up to 90 days. Max Daily Amount: 5 mg         Prior labs and Blood pressures:  BP Readings from Last 3 Encounters:   12/05/23 118/74   06/08/23 (!) 149/95   01/26/23 (!) 137/96     Lab Results   Component Value Date/Time     12/05/2023 12:38 PM    K 4.4 12/05/2023 12:38 PM     12/05/2023 12:38 PM    CO2 29 12/05/2023 12:38 PM    BUN 23 12/05/2023 12:38 PM    GFRAA >60 10/26/2021 10:08 AM     No results found for: \"HBA1C\", \"[id number]\"  Lab Results   Component Value Date/Time    CHOL 191 12/12/2022 09:26 AM    HDL 51 12/12/2022 09:26 AM     No results found for: \"VITD3\", \"VD3RIA\"    Lab Results   Component Value Date/Time    TSH 0.83 12/12/2022 09:26 AM        [Takes medication as prescribed] : takes [None] : Patient does not have any barriers to medication adherence

## 2023-12-19 RX ORDER — ZOLPIDEM TARTRATE 5 MG/1
5 TABLET ORAL NIGHTLY PRN
Qty: 15 TABLET | Refills: 0 | OUTPATIENT
Start: 2023-12-19 | End: 2024-03-18

## 2024-01-17 DIAGNOSIS — F98.8 ATTENTION DEFICIT DISORDER (ADD) WITHOUT HYPERACTIVITY: ICD-10-CM

## 2024-01-17 RX ORDER — LISDEXAMFETAMINE DIMESYLATE CAPSULES 40 MG/1
40 CAPSULE ORAL DAILY
Qty: 30 CAPSULE | Refills: 0 | Status: SHIPPED | OUTPATIENT
Start: 2024-01-17 | End: 2024-02-16

## 2024-01-17 RX ORDER — LISDEXAMFETAMINE DIMESYLATE CAPSULES 30 MG/1
30 CAPSULE ORAL
Qty: 30 CAPSULE | Refills: 0 | Status: SHIPPED | OUTPATIENT
Start: 2024-01-17 | End: 2024-02-16

## 2024-01-21 DIAGNOSIS — F51.01 PRIMARY INSOMNIA: ICD-10-CM

## 2024-01-22 NOTE — TELEPHONE ENCOUNTER
PCP: Tristian Moreno MD    Last appt: 12/5/2023   Future Appointments   Date Time Provider Department Center   1/30/2024 10:20 AM Cindy Duong PA [facility] AMB   4/9/2024  1:30 PM Tristian Moreno MD [facility] AMB       Requested Prescriptions     Pending Prescriptions Disp Refills    zolpidem (AMBIEN) 5 MG tablet 15 tablet 0     Sig: Take 1 tablet by mouth nightly as needed for Sleep for up to 90 days. Max Daily Amount: 5 mg         Prior labs and Blood pressures:  BP Readings from Last 3 Encounters:   12/05/23 118/74   06/08/23 (!) 149/95   01/26/23 (!) 137/96     Lab Results   Component Value Date/Time     12/05/2023 12:38 PM    K 4.4 12/05/2023 12:38 PM     12/05/2023 12:38 PM    CO2 29 12/05/2023 12:38 PM    BUN 23 12/05/2023 12:38 PM    GFRAA >60 10/26/2021 10:08 AM     No results found for: \"HBA1C\", \"[id number]\"  Lab Results   Component Value Date/Time    CHOL 191 12/12/2022 09:26 AM    HDL 51 12/12/2022 09:26 AM     No results found for: \"VITD3\", \"VD3RIA\"    Lab Results   Component Value Date/Time    TSH 0.83 12/12/2022 09:26 AM

## 2024-01-25 RX ORDER — ZOLPIDEM TARTRATE 5 MG/1
5 TABLET ORAL NIGHTLY PRN
Qty: 15 TABLET | Refills: 0 | Status: SHIPPED | OUTPATIENT
Start: 2024-02-02 | End: 2024-05-02

## 2024-01-30 ENCOUNTER — OFFICE VISIT (OUTPATIENT)
Age: 54
End: 2024-01-30
Payer: COMMERCIAL

## 2024-01-30 VITALS
TEMPERATURE: 98.4 F | OXYGEN SATURATION: 98 % | SYSTOLIC BLOOD PRESSURE: 122 MMHG | HEIGHT: 69 IN | DIASTOLIC BLOOD PRESSURE: 89 MMHG | HEART RATE: 78 BPM | WEIGHT: 207 LBS | BODY MASS INDEX: 30.66 KG/M2

## 2024-01-30 DIAGNOSIS — K76.0 FATTY (CHANGE OF) LIVER, NOT ELSEWHERE CLASSIFIED: Primary | ICD-10-CM

## 2024-01-30 PROCEDURE — 99214 OFFICE O/P EST MOD 30 MIN: CPT | Performed by: PHYSICIAN ASSISTANT

## 2024-01-30 PROCEDURE — 91200 LIVER ELASTOGRAPHY: CPT | Performed by: PHYSICIAN ASSISTANT

## 2024-01-30 RX ORDER — ALBUTEROL SULFATE 90 UG/1
AEROSOL, METERED RESPIRATORY (INHALATION)
COMMUNITY
Start: 2024-01-29

## 2024-01-30 RX ORDER — AZITHROMYCIN 250 MG/1
TABLET, FILM COATED ORAL
COMMUNITY
Start: 2024-01-29

## 2024-01-30 RX ORDER — BENZONATATE 100 MG/1
CAPSULE ORAL
COMMUNITY
Start: 2024-01-29

## 2024-01-30 ASSESSMENT — PATIENT HEALTH QUESTIONNAIRE - PHQ9
SUM OF ALL RESPONSES TO PHQ QUESTIONS 1-9: 0
1. LITTLE INTEREST OR PLEASURE IN DOING THINGS: 0
SUM OF ALL RESPONSES TO PHQ9 QUESTIONS 1 & 2: 0
SUM OF ALL RESPONSES TO PHQ QUESTIONS 1-9: 0
SUM OF ALL RESPONSES TO PHQ QUESTIONS 1-9: 0
2. FEELING DOWN, DEPRESSED OR HOPELESS: 0
SUM OF ALL RESPONSES TO PHQ QUESTIONS 1-9: 0

## 2024-01-30 NOTE — PROGRESS NOTES
[facility]      Rome Viveros MD, FACP, FACG, FAASLD      HINA Green, [facility]   Farnaz Jackson, [facility]   Lita Starks, FNP-C  Rich Castillo, FNP-C   Alka Davis, [facility]   at [facility]   [address], [address]   [place]  [address] [phone]   FAX: [phone]  [facility]   [address], [address]   [place]  [address] [phone]   FAX: [phone]     Patient Care Team:  Tristian Moreno MD as PCP - General  Tristian Moreno MD as PCP - Empaneled Provider    Patient Active Problem List   Diagnosis    Primary osteoarthritis of right knee    AR (allergic rhinitis)    ADD (attention deficit disorder)    ED (erectile dysfunction)    Vitamin D deficiency    Family history of premature CAD    Psoriasis    Allergic reaction    Fatty liver    Obesity, Class I, BMI 30-34.9    Elevated LFTs    Ataxia     Dean Zafar is being seen at [facility] for management of fatty liver. The active problem list, all pertinent past medical history, medications, liver histology and laboratory findings related to the liver disorder were reviewed with the patient.      The patient is a 53 y.o.  male who was found to have liver disease in 12/2019 via ultrasound.    FibroScan was performed 12/2019 and showed fatty liver and no fibrosis. Repeat study in 2020 and 2023 showed a similar result.   This will be repeated today.     The patient has no symptoms which can be attributed to the liver disorder. He has had recent US late 2022 of the abdomen suggesting ongoing steatosis and + gallstones, he has been without significant pain symptoms and no aggravation with meals.     He has made conscious effort at elimination of alcohol recently (dry Erik) and has had

## 2024-01-30 NOTE — PROGRESS NOTES
Identified pt with two pt identifiers(name and ). Reviewed record in preparation for visit and have obtained necessary documentation.    Chief Complaint   Patient presents with    Other     FS 1yr follow up     /89 (Site: Right Upper Arm, Position: Sitting, Cuff Size: Medium Adult)   Pulse 78   Temp 98.4 °F (36.9 °C)   Ht 1.753 m (5' 9\")   Wt 93.9 kg (207 lb)   SpO2 98%   BMI 30.57 kg/m²       1. \"Have you been to the ER, urgent care clinic since your last visit?  Hospitalized since your last visit?\" No    2. \"Have you seen or consulted any other health care providers outside of the [facility] since your last visit?\" Yes PCP, Ortho      Patient is accompanied by self I have received verbal consent from Dean Zafar to discuss any/all medical information while they are present in the room.

## 2024-02-03 ENCOUNTER — PATIENT MESSAGE (OUTPATIENT)
Age: 54
End: 2024-02-03

## 2024-02-03 DIAGNOSIS — F98.8 ATTENTION DEFICIT DISORDER (ADD) WITHOUT HYPERACTIVITY: ICD-10-CM

## 2024-02-03 DIAGNOSIS — F98.8 ATTENTION DEFICIT DISORDER (ADD) WITHOUT HYPERACTIVITY: Primary | ICD-10-CM

## 2024-02-07 DIAGNOSIS — F98.8 ATTENTION DEFICIT DISORDER (ADD) WITHOUT HYPERACTIVITY: Primary | ICD-10-CM

## 2024-02-07 RX ORDER — LISDEXAMFETAMINE DIMESYLATE CAPSULES 40 MG/1
40 CAPSULE ORAL DAILY
Qty: 31 CAPSULE | Refills: 0 | Status: SHIPPED | OUTPATIENT
Start: 2024-02-17 | End: 2024-02-07 | Stop reason: SDUPTHER

## 2024-02-07 RX ORDER — LISDEXAMFETAMINE DIMESYLATE CAPSULES 40 MG/1
40 CAPSULE ORAL DAILY
Qty: 31 CAPSULE | Refills: 0 | Status: SHIPPED | OUTPATIENT
Start: 2024-03-19 | End: 2024-04-19

## 2024-02-07 RX ORDER — LISDEXAMFETAMINE DIMESYLATE CAPSULES 30 MG/1
30 CAPSULE ORAL EVERY EVENING
Qty: 31 CAPSULE | Refills: 0 | Status: SHIPPED | OUTPATIENT
Start: 2024-03-19 | End: 2024-04-19

## 2024-02-07 RX ORDER — LISDEXAMFETAMINE DIMESYLATE CAPSULES 30 MG/1
30 CAPSULE ORAL EVERY EVENING
Qty: 31 CAPSULE | Refills: 0 | Status: SHIPPED | OUTPATIENT
Start: 2024-02-17 | End: 2024-02-07 | Stop reason: SDUPTHER

## 2024-02-07 NOTE — TELEPHONE ENCOUNTER
From: Dean Zafar  Sent: 2/7/2024 9:24 AM EST  To: Dale Curry Clinical Staff  Subject: vyvanse 30mg and 40 mg    Hi Dr Moreno just following up on this , can you please call in 3 months worth of vyvanse 40 and 30 so I can  every 30 days beginning in the 16th of each month starting Feb 16 , thank you

## 2024-02-09 RX ORDER — LISDEXAMFETAMINE DIMESYLATE CAPSULES 40 MG/1
40 CAPSULE ORAL DAILY
Qty: 30 CAPSULE | Refills: 0 | Status: SHIPPED | OUTPATIENT
Start: 2024-02-09 | End: 2024-03-10

## 2024-02-09 NOTE — TELEPHONE ENCOUNTER
Patient has been advised - no   We need to see him in April for his follow up before we can do 3 month refills       Lov 12/05/2023    Has follow up scheduled for 04/09/2024

## 2024-03-31 DIAGNOSIS — F51.01 PRIMARY INSOMNIA: ICD-10-CM

## 2024-04-01 NOTE — TELEPHONE ENCOUNTER
PCP: Tristian Moreno MD    Last appt: 12/5/2023     Future Appointments   Date Time Provider Department Center   4/9/2024  1:30 PM Tristian Moreno MD [facility] AMB   1/30/2025  1:40 PM Cindy Duong PA [facility] AMB       Requested Prescriptions     Pending Prescriptions Disp Refills    zolpidem (AMBIEN) 5 MG tablet 15 tablet 0     Sig: Take 1 tablet by mouth nightly as needed for Sleep for up to 90 days. Max Daily Amount: 5 mg       Prior labs and Blood pressures:  BP Readings from Last 3 Encounters:   01/30/24 122/89   12/05/23 118/74   06/08/23 (!) 149/95     Lab Results   Component Value Date/Time     12/05/2023 12:38 PM    K 4.4 12/05/2023 12:38 PM     12/05/2023 12:38 PM    CO2 29 12/05/2023 12:38 PM    BUN 23 12/05/2023 12:38 PM    GFRAA >60 10/26/2021 10:08 AM     No results found for: \"HBA1C\", \"[id number]\"  Lab Results   Component Value Date/Time    CHOL 191 12/12/2022 09:26 AM    HDL 51 12/12/2022 09:26 AM     No results found for: \"VITD3\", \"VD3RIA\"    Lab Results   Component Value Date/Time    TSH 0.83 12/12/2022 09:26 AM

## 2024-04-03 DIAGNOSIS — F98.8 ATTENTION DEFICIT DISORDER (ADD) WITHOUT HYPERACTIVITY: ICD-10-CM

## 2024-04-04 RX ORDER — LISDEXAMFETAMINE DIMESYLATE CAPSULES 40 MG/1
40 CAPSULE ORAL DAILY
Qty: 30 CAPSULE | Refills: 0 | Status: SHIPPED | OUTPATIENT
Start: 2024-04-04 | End: 2024-05-04

## 2024-04-04 NOTE — TELEPHONE ENCOUNTER
Patient LOV: 12/5/2023 with Dr. Moreno  Next OV: 5/28/2024 with Dr. Moreno  Please advise or please send. PT had a visit scheduled for 4/9 with Dr. Moreno, but this was cancelled due to provider not going to be in office. He has scheduled next available appt.

## 2024-04-05 RX ORDER — LISDEXAMFETAMINE DIMESYLATE 40 MG/1
40 CAPSULE ORAL DAILY
Qty: 31 CAPSULE | Refills: 0 | OUTPATIENT
Start: 2024-04-05 | End: 2024-05-06

## 2024-04-05 RX ORDER — LISDEXAMFETAMINE DIMESYLATE 30 MG/1
30 CAPSULE ORAL EVERY EVENING
Qty: 31 CAPSULE | Refills: 0 | OUTPATIENT
Start: 2024-04-05 | End: 2024-05-06

## 2024-04-05 NOTE — TELEPHONE ENCOUNTER
PCP: Tristian Moreno MD    Last appt: 12/5/2023     Future Appointments   Date Time Provider Department Center   5/28/2024  1:00 PM Tristian Moreno MD [facility] AMB   1/30/2025  1:40 PM Cindy Duong PA [facility] AMB       Requested Prescriptions     Pending Prescriptions Disp Refills    Lisdexamfetamine Dimesylate 40 MG CAPS 31 capsule 0     Sig: Take 40 mg by mouth daily for 31 days. Max Daily Amount: 40 mg    lisdexamfetamine (VYVANSE) 30 MG capsule 31 capsule 0     Sig: Take 1 capsule by mouth every evening for 31 days. Max Daily Amount: 30 mg       Prior labs and Blood pressures:  BP Readings from Last 3 Encounters:   01/30/24 122/89   12/05/23 118/74   06/08/23 (!) 149/95     Lab Results   Component Value Date/Time     12/05/2023 12:38 PM    K 4.4 12/05/2023 12:38 PM     12/05/2023 12:38 PM    CO2 29 12/05/2023 12:38 PM    BUN 23 12/05/2023 12:38 PM    GFRAA >60 10/26/2021 10:08 AM     No results found for: \"HBA1C\", \"ALX1CJGX\"  Lab Results   Component Value Date/Time    CHOL 191 12/12/2022 09:26 AM    HDL 51 12/12/2022 09:26 AM     No results found for: \"VITD3\", \"VD3RIA\"    Lab Results   Component Value Date/Time    TSH 0.83 12/12/2022 09:26 AM

## 2024-04-10 ENCOUNTER — PATIENT MESSAGE (OUTPATIENT)
Age: 54
End: 2024-04-10

## 2024-04-10 DIAGNOSIS — F98.8 ATTENTION DEFICIT DISORDER (ADD) WITHOUT HYPERACTIVITY: ICD-10-CM

## 2024-04-10 RX ORDER — LISDEXAMFETAMINE DIMESYLATE CAPSULES 30 MG/1
30 CAPSULE ORAL EVERY EVENING
Qty: 30 CAPSULE | Refills: 0 | Status: SHIPPED | OUTPATIENT
Start: 2024-04-20 | End: 2024-05-20

## 2024-04-10 NOTE — TELEPHONE ENCOUNTER
PCP: Tristian Moreno MD    Last appt: 12/5/2023       Future Appointments   Date Time Provider Department Center   5/28/2024  1:00 PM Tristian Moreno MD [facility] AMB   1/30/2025  1:40 PM Cindy Duong PA [facility] AMB       Requested Prescriptions      No prescriptions requested or ordered in this encounter       Prior labs and Blood pressures:  BP Readings from Last 3 Encounters:   01/30/24 122/89   12/05/23 118/74   06/08/23 (!) 149/95     Lab Results   Component Value Date/Time     12/05/2023 12:38 PM    K 4.4 12/05/2023 12:38 PM     12/05/2023 12:38 PM    CO2 29 12/05/2023 12:38 PM    BUN 23 12/05/2023 12:38 PM    GFRAA >60 10/26/2021 10:08 AM     No results found for: \"HBA1C\", \"PVF1XWYJ\"  Lab Results   Component Value Date/Time    CHOL 191 12/12/2022 09:26 AM    HDL 51 12/12/2022 09:26 AM     No results found for: \"VITD3\", \"VD3RIA\"    Lab Results   Component Value Date/Time    TSH 0.83 12/12/2022 09:26 AM

## 2024-04-10 NOTE — TELEPHONE ENCOUNTER
From: Dean Zafar  To: Dr. Tristian Moreno  Sent: 4/10/2024 10:54 AM EDT  Subject: Vyvanse 30 mg URGENT    Hello My 40 mg vyvanse has been called in howeve my 30mg has not been called in and I dont see it on my chart?I have been taking 40mg in am and30 mg at noon for a few years please call in my 30mg vyvanse as I need to  next week so I do not run out

## 2024-04-15 DIAGNOSIS — F51.01 PRIMARY INSOMNIA: ICD-10-CM

## 2024-04-17 RX ORDER — ZOLPIDEM TARTRATE 5 MG/1
5 TABLET ORAL NIGHTLY PRN
Qty: 15 TABLET | Refills: 0 | Status: SHIPPED | OUTPATIENT
Start: 2024-04-17 | End: 2024-07-16

## 2024-04-24 RX ORDER — ZOLPIDEM TARTRATE 5 MG/1
5 TABLET ORAL NIGHTLY PRN
Qty: 15 TABLET | Refills: 0 | OUTPATIENT
Start: 2024-04-24 | End: 2024-07-23

## 2024-05-09 DIAGNOSIS — F98.8 ATTENTION DEFICIT DISORDER (ADD) WITHOUT HYPERACTIVITY: ICD-10-CM

## 2024-05-09 RX ORDER — LISDEXAMFETAMINE DIMESYLATE 30 MG/1
30 CAPSULE ORAL EVERY EVENING
Qty: 30 CAPSULE | Refills: 0 | Status: CANCELLED | OUTPATIENT
Start: 2024-05-09 | End: 2024-06-08

## 2024-05-10 NOTE — TELEPHONE ENCOUNTER
Patient LOV: 12/5/2023 with Dr. Moreno  Next OV: 5/28/2024 with Dr. Moreno  Please send or please advise.

## 2024-05-12 DIAGNOSIS — F98.8 ATTENTION DEFICIT DISORDER (ADD) WITHOUT HYPERACTIVITY: ICD-10-CM

## 2024-05-13 DIAGNOSIS — F98.8 ATTENTION DEFICIT DISORDER (ADD) WITHOUT HYPERACTIVITY: ICD-10-CM

## 2024-05-13 RX ORDER — LISDEXAMFETAMINE DIMESYLATE 30 MG/1
30 CAPSULE ORAL EVERY EVENING
Qty: 30 CAPSULE | Refills: 0 | Status: CANCELLED | OUTPATIENT
Start: 2024-05-13 | End: 2024-06-12

## 2024-05-13 RX ORDER — LISDEXAMFETAMINE DIMESYLATE 40 MG/1
40 CAPSULE ORAL DAILY
Qty: 30 CAPSULE | Refills: 0 | Status: SHIPPED | OUTPATIENT
Start: 2024-05-18 | End: 2024-06-17

## 2024-05-14 RX ORDER — GUANFACINE 1 MG/1
1 TABLET, EXTENDED RELEASE ORAL DAILY
Qty: 30 TABLET | Refills: 2 | Status: SHIPPED | OUTPATIENT
Start: 2024-05-14

## 2024-05-15 DIAGNOSIS — F98.8 ATTENTION DEFICIT DISORDER (ADD) WITHOUT HYPERACTIVITY: ICD-10-CM

## 2024-05-15 NOTE — TELEPHONE ENCOUNTER
PCP: Tristian Moreno MD    Last appt: 12/5/2023   Future Appointments   Date Time Provider Department Center   5/28/2024  1:00 PM Tristian Moreno MD [facility] AMB   1/30/2025  1:40 PM Cindy Duong PA [facility] AMB       Requested Prescriptions     Pending Prescriptions Disp Refills    lisdexamfetamine (VYVANSE) 30 MG capsule 30 capsule 0     Sig: Take 1 capsule by mouth every evening for 30 days. Max Daily Amount: 30 mg         Prior labs and Blood pressures:  BP Readings from Last 3 Encounters:   01/30/24 122/89   12/05/23 118/74   06/08/23 (!) 149/95     Lab Results   Component Value Date/Time     12/05/2023 12:38 PM    K 4.4 12/05/2023 12:38 PM     12/05/2023 12:38 PM    CO2 29 12/05/2023 12:38 PM    BUN 23 12/05/2023 12:38 PM    GFRAA >60 10/26/2021 10:08 AM     No results found for: \"HBA1C\", \"[id number]\"  Lab Results   Component Value Date/Time    CHOL 191 12/12/2022 09:26 AM    HDL 51 12/12/2022 09:26 AM    .2 12/12/2022 09:26 AM    VLDL 15.8 12/12/2022 09:26 AM     No results found for: \"VITD3\"    Lab Results   Component Value Date/Time    TSH 0.83 12/12/2022 09:26 AM

## 2024-05-17 DIAGNOSIS — F98.8 ATTENTION DEFICIT DISORDER (ADD) WITHOUT HYPERACTIVITY: ICD-10-CM

## 2024-05-19 RX ORDER — LISDEXAMFETAMINE DIMESYLATE 30 MG/1
30 CAPSULE ORAL EVERY EVENING
Qty: 30 CAPSULE | Refills: 0 | Status: SHIPPED | OUTPATIENT
Start: 2024-05-19 | End: 2024-06-18

## 2024-05-19 RX ORDER — LISDEXAMFETAMINE DIMESYLATE 30 MG/1
30 CAPSULE ORAL EVERY EVENING
Qty: 30 CAPSULE | Refills: 0 | OUTPATIENT
Start: 2024-05-19 | End: 2024-06-18

## 2024-05-28 ENCOUNTER — OFFICE VISIT (OUTPATIENT)
Age: 54
End: 2024-05-28
Payer: COMMERCIAL

## 2024-05-28 VITALS
SYSTOLIC BLOOD PRESSURE: 118 MMHG | BODY MASS INDEX: 30.66 KG/M2 | HEIGHT: 69 IN | RESPIRATION RATE: 16 BRPM | OXYGEN SATURATION: 99 % | WEIGHT: 207 LBS | TEMPERATURE: 98.6 F | DIASTOLIC BLOOD PRESSURE: 80 MMHG | HEART RATE: 81 BPM

## 2024-05-28 DIAGNOSIS — R39.12 BENIGN PROSTATIC HYPERPLASIA WITH WEAK URINARY STREAM: ICD-10-CM

## 2024-05-28 DIAGNOSIS — Z00.00 PHYSICAL EXAM: Primary | ICD-10-CM

## 2024-05-28 DIAGNOSIS — N52.9 ERECTILE DYSFUNCTION, UNSPECIFIED ERECTILE DYSFUNCTION TYPE: ICD-10-CM

## 2024-05-28 DIAGNOSIS — Z51.81 MEDICATION MONITORING ENCOUNTER: ICD-10-CM

## 2024-05-28 DIAGNOSIS — F98.8 ATTENTION DEFICIT DISORDER (ADD) WITHOUT HYPERACTIVITY: ICD-10-CM

## 2024-05-28 DIAGNOSIS — N40.1 BENIGN PROSTATIC HYPERPLASIA WITH WEAK URINARY STREAM: ICD-10-CM

## 2024-05-28 PROCEDURE — 99396 PREV VISIT EST AGE 40-64: CPT | Performed by: FAMILY MEDICINE

## 2024-05-28 RX ORDER — TADALAFIL 5 MG/1
5 TABLET ORAL DAILY
Qty: 90 TABLET | Refills: 1 | Status: SHIPPED | OUTPATIENT
Start: 2024-05-28

## 2024-05-28 RX ORDER — LISDEXAMFETAMINE DIMESYLATE 30 MG/1
30 CAPSULE ORAL EVERY EVENING
Qty: 90 CAPSULE | Refills: 0 | Status: SHIPPED | OUTPATIENT
Start: 2024-06-17 | End: 2024-09-15

## 2024-05-28 RX ORDER — LISDEXAMFETAMINE DIMESYLATE 40 MG/1
40 CAPSULE ORAL DAILY
Qty: 90 CAPSULE | Refills: 0 | Status: SHIPPED | OUTPATIENT
Start: 2024-06-17 | End: 2024-09-15

## 2024-05-28 SDOH — ECONOMIC STABILITY: FOOD INSECURITY: WITHIN THE PAST 12 MONTHS, THE FOOD YOU BOUGHT JUST DIDN'T LAST AND YOU DIDN'T HAVE MONEY TO GET MORE.: NEVER TRUE

## 2024-05-28 SDOH — ECONOMIC STABILITY: FOOD INSECURITY: WITHIN THE PAST 12 MONTHS, YOU WORRIED THAT YOUR FOOD WOULD RUN OUT BEFORE YOU GOT MONEY TO BUY MORE.: NEVER TRUE

## 2024-05-28 SDOH — ECONOMIC STABILITY: INCOME INSECURITY: HOW HARD IS IT FOR YOU TO PAY FOR THE VERY BASICS LIKE FOOD, HOUSING, MEDICAL CARE, AND HEATING?: NOT HARD AT ALL

## 2024-05-28 ASSESSMENT — PATIENT HEALTH QUESTIONNAIRE - PHQ9
2. FEELING DOWN, DEPRESSED OR HOPELESS: NOT AT ALL
SUM OF ALL RESPONSES TO PHQ9 QUESTIONS 1 & 2: 0
SUM OF ALL RESPONSES TO PHQ QUESTIONS 1-9: 0
SUM OF ALL RESPONSES TO PHQ QUESTIONS 1-9: 0
1. LITTLE INTEREST OR PLEASURE IN DOING THINGS: NOT AT ALL
SUM OF ALL RESPONSES TO PHQ QUESTIONS 1-9: 0
SUM OF ALL RESPONSES TO PHQ QUESTIONS 1-9: 0

## 2024-05-28 ASSESSMENT — ENCOUNTER SYMPTOMS
SHORTNESS OF BREATH: 0
BACK PAIN: 0
NAUSEA: 0
COUGH: 0
VOMITING: 0

## 2024-05-28 NOTE — PROGRESS NOTES
HPI  Dean Zafar 53 y.o. male  presents to the office today for follow up on chronic conditions.     Blood pressure 118/80, pulse 81, temperature 98.6 °F (37 °C), temperature source Temporal, resp. rate 16, height 1.753 m (5' 9\"), weight 93.9 kg (207 lb), SpO2 99 %. Body mass index is 30.57 kg/m².   Chief Complaint   Patient presents with    ADD    Erectile Dysfunction    Follow-up Chronic Condition    Annual Exam      He is overall doing well. He exercises for about an hour a day (mostly cardio, some weight lifting).     BP today is 146/88 and 118/80 on manual repeat. He reports BP at home this morning was 118/72.    ADD: Pt is taking Vyvanse 40mg in morning and 30mg in the afternoon. He also takes Intuniv 1mg nightly. He reports his HR typically increases with the Vyvanse, but doesn't typically go above 80bpm. He is happy with his current regimen and requests refill.     Benign prostatic hyperplasia: Pt has noticed weakened urinary stream, especially later in the day. He spoke to a friend who is a urologist (Dr. Spring) and he recommended looking into taking cialis daily. Pt is interested in trying it.      He reports intermittent insomnia. He reports Ambien is the best thing to help him sleep, but understands why he can not take it regularly. He reports his sleep is often restless. He also complains of fatigue and brain fog. He has cut back on his alcohol consumption (now ~3 drinks per week). He typically goes to bed around 11pm and has dinner at 6pm.     He had a fibroscan in January 2024 which he reports showed improvement. He is under the care of hepatologist, SO Duong.     Pt reports 1.5 years ago he had a rash on his back with 4-5 rings. He did a course of antibiotics. The rash is still slightly noticeable. He denies burning sensation.     Health Maintenance:   HIV screening: declined  Tdap: due, will schedule nurse visit  Hep B Vaccine: due, will schedule nurse visit  Colonoscopy: 12/2023, with

## 2024-05-28 NOTE — PROGRESS NOTES
Chief Complaint   Patient presents with    ADD    Erectile Dysfunction    Follow-up Chronic Condition    Annual Exam       \"Have you been to the ER, urgent care clinic since your last visit?  Hospitalized since your last visit?\"    no    “Have you seen or consulted any other health care providers outside of [facility] since your last visit?”    no      “Have you had a colorectal cancer screening such as a colonoscopy/FIT/Cologuard?    Yes - Dr. Jordan     Date of last Colonoscopy: 10/30/2018  No cologuard on file  No FIT/FOBT on file   No flexible sigmoidoscopy on file         Click Here for Release of Records Request

## 2024-06-17 ENCOUNTER — TELEPHONE (OUTPATIENT)
Age: 54
End: 2024-06-17

## 2024-06-17 DIAGNOSIS — F98.8 ATTENTION DEFICIT DISORDER (ADD) WITHOUT HYPERACTIVITY: Primary | ICD-10-CM

## 2024-06-17 NOTE — TELEPHONE ENCOUNTER
MD Moreno,    TAMMIEI:    Yoselin stating insurance will only cover 30 d/s of the Vyvanse prescriptions.  30mg and 40mg sent today, 6/17/24 for 90.    Can fill only #30 for each and Void the remaining 60.      Will need new rx's for the next prescriptions in July.        For Pharmacy Admin Tracking Only    Program: Medication Refill  CPA in place:    Recommendation Provided To:   Intervention Detail: Discontinued Rx: 2, reason: Duplicate Therapy  Intervention Accepted By:   Gap Closed?:    Time Spent (min): 5

## 2024-06-20 RX ORDER — LISDEXAMFETAMINE DIMESYLATE 40 MG/1
40 CAPSULE ORAL DAILY
Qty: 30 CAPSULE | Refills: 0 | Status: SHIPPED | OUTPATIENT
Start: 2024-08-16 | End: 2024-09-15

## 2024-06-20 RX ORDER — LISDEXAMFETAMINE DIMESYLATE 40 MG/1
40 CAPSULE ORAL DAILY
Qty: 30 CAPSULE | Refills: 0 | Status: SHIPPED | OUTPATIENT
Start: 2024-07-17 | End: 2024-08-16

## 2024-06-20 RX ORDER — LISDEXAMFETAMINE DIMESYLATE 30 MG/1
30 CAPSULE ORAL DAILY
Qty: 30 CAPSULE | Refills: 0 | Status: SHIPPED | OUTPATIENT
Start: 2024-08-16 | End: 2024-09-15

## 2024-06-20 RX ORDER — LISDEXAMFETAMINE DIMESYLATE 30 MG/1
30 CAPSULE ORAL EVERY EVENING
Qty: 30 CAPSULE | Refills: 0 | Status: SHIPPED | OUTPATIENT
Start: 2024-07-17 | End: 2024-08-16

## 2024-06-22 ENCOUNTER — PATIENT MESSAGE (OUTPATIENT)
Age: 54
End: 2024-06-22

## 2024-06-22 DIAGNOSIS — G47.00 INSOMNIA, UNSPECIFIED TYPE: Primary | ICD-10-CM

## 2024-06-24 RX ORDER — DOXEPIN 3 MG/1
3 TABLET, FILM COATED ORAL NIGHTLY
Qty: 30 TABLET | Refills: 5 | Status: SHIPPED | OUTPATIENT
Start: 2024-06-24

## 2024-06-25 NOTE — TELEPHONE ENCOUNTER
From: Dean Zafar  To: Dr. Tristian Moreno  Sent: 6/22/2024 3:36 PM EDT  Subject: DOXEPIN HYDROCHLORIDE 3mg    Hi Dr Moreno still having sleep issues(recurring for many many years) I realize I can’t take ambien more than once a week although it’s the only thing that has worked . I did have some luck with doxepin that you had prescribed last year but it made me very groggy next day and irritable. My insurance finally covers the low dose 3mg version ( I was in 10 previously) can you call the 3mg in for me as I would like to try that . Thx DOXEPIN HYDROCHLORIDE 3mg is how it’s listed in my insurance plan

## 2024-07-02 ENCOUNTER — TELEMEDICINE (OUTPATIENT)
Age: 54
End: 2024-07-02
Payer: COMMERCIAL

## 2024-07-02 DIAGNOSIS — U07.1 COVID-19: Primary | ICD-10-CM

## 2024-07-02 PROCEDURE — 99214 OFFICE O/P EST MOD 30 MIN: CPT | Performed by: STUDENT IN AN ORGANIZED HEALTH CARE EDUCATION/TRAINING PROGRAM

## 2024-07-02 RX ORDER — ALBUTEROL SULFATE 90 UG/1
2 AEROSOL, METERED RESPIRATORY (INHALATION) 4 TIMES DAILY PRN
Qty: 54 G | Refills: 1 | Status: SHIPPED | OUTPATIENT
Start: 2024-07-02

## 2024-07-02 RX ORDER — PREDNISONE 5 MG/1
TABLET ORAL
Qty: 1 EACH | Refills: 0 | Status: SHIPPED | OUTPATIENT
Start: 2024-07-02

## 2024-07-02 NOTE — PROGRESS NOTES
Year: No     Patient Active Problem List   Diagnosis    Primary osteoarthritis of right knee    AR (allergic rhinitis)    ADD (attention deficit disorder)    ED (erectile dysfunction)    Vitamin D deficiency    Family history of premature CAD    Psoriasis    Allergic reaction    Fatty liver    Obesity, Class I, BMI 30-34.9    Elevated LFTs    Ataxia          Current Medications/Allergies   Medications and Allergies reviewed:    Current Outpatient Medications   Medication Sig Dispense Refill    predniSONE 5 MG (21) TBPK Per package instructions 1 each 0    albuterol sulfate HFA (VENTOLIN HFA) 108 (90 Base) MCG/ACT inhaler Inhale 2 puffs into the lungs 4 times daily as needed for Wheezing 54 g 1    doxepin (SILENOR) 3 MG TABS tablet Take 1 tablet by mouth nightly 30 tablet 5    [START ON 7/17/2024] Lisdexamfetamine Dimesylate 40 MG CAPS Take 40 mg by mouth daily for 30 days. Max Daily Amount: 40 mg 30 capsule 0    [START ON 7/17/2024] lisdexamfetamine (VYVANSE) 30 MG capsule Take 1 capsule by mouth every evening for 30 days. Max Daily Amount: 30 mg 30 capsule 0    [START ON 8/16/2024] Lisdexamfetamine Dimesylate 40 MG CAPS Take 40 mg by mouth daily for 30 days. Max Daily Amount: 40 mg 30 capsule 0    [START ON 8/16/2024] lisdexamfetamine (VYVANSE) 30 MG capsule Take 1 capsule by mouth daily for 30 days. Max Daily Amount: 30 mg 30 capsule 0    tadalafil (CIALIS) 5 MG tablet Take 1 tablet by mouth daily 90 tablet 1    guanFACINE (INTUNIV) 1 MG TB24 extended release tablet Take 1 tablet by mouth daily 30 tablet 2    zolpidem (AMBIEN) 5 MG tablet Take 1 tablet by mouth nightly as needed for Sleep for up to 90 days. Max Daily Amount: 5 mg 15 tablet 0    ALPRAZolam (XANAX) 1 MG tablet Take by mouth.      tadalafil (CIALIS) 10 MG tablet Take by mouth daily as needed       No current facility-administered medications for this visit.     No Known Allergies

## 2024-07-15 DIAGNOSIS — F98.8 ATTENTION DEFICIT DISORDER (ADD) WITHOUT HYPERACTIVITY: ICD-10-CM

## 2024-07-15 DIAGNOSIS — F51.01 PRIMARY INSOMNIA: ICD-10-CM

## 2024-07-15 RX ORDER — ZOLPIDEM TARTRATE 5 MG/1
5 TABLET ORAL NIGHTLY PRN
Qty: 15 TABLET | Refills: 0 | Status: SHIPPED | OUTPATIENT
Start: 2024-07-15 | End: 2024-10-13

## 2024-07-15 RX ORDER — GUANFACINE 1 MG/1
1 TABLET, EXTENDED RELEASE ORAL NIGHTLY
Qty: 90 TABLET | Refills: 0 | Status: SHIPPED | OUTPATIENT
Start: 2024-07-15

## 2024-07-15 RX ORDER — GUANFACINE 1 MG/1
1 TABLET, EXTENDED RELEASE ORAL NIGHTLY
Qty: 90 TABLET | OUTPATIENT
Start: 2024-07-15

## 2024-07-31 ENCOUNTER — NURSE ONLY (OUTPATIENT)
Age: 54
End: 2024-07-31
Payer: COMMERCIAL

## 2024-07-31 DIAGNOSIS — Z00.00 PHYSICAL EXAM: ICD-10-CM

## 2024-07-31 DIAGNOSIS — Z51.81 MEDICATION MONITORING ENCOUNTER: ICD-10-CM

## 2024-07-31 DIAGNOSIS — Z23 NEED FOR TDAP VACCINATION: Primary | ICD-10-CM

## 2024-07-31 LAB
ALBUMIN SERPL-MCNC: 3.7 G/DL (ref 3.5–5)
ALBUMIN/GLOB SERPL: 1.3 (ref 1.1–2.2)
ALP SERPL-CCNC: 64 U/L (ref 45–117)
ALT SERPL-CCNC: 31 U/L (ref 12–78)
ANION GAP SERPL CALC-SCNC: 5 MMOL/L (ref 5–15)
APPEARANCE UR: CLEAR
AST SERPL-CCNC: 12 U/L (ref 15–37)
BACTERIA URNS QL MICRO: NEGATIVE /HPF
BILIRUB SERPL-MCNC: 1 MG/DL (ref 0.2–1)
BILIRUB UR QL: NEGATIVE
BUN SERPL-MCNC: 26 MG/DL (ref 6–20)
BUN/CREAT SERPL: 21 (ref 12–20)
CALCIUM SERPL-MCNC: 9 MG/DL (ref 8.5–10.1)
CHLORIDE SERPL-SCNC: 104 MMOL/L (ref 97–108)
CHOLEST SERPL-MCNC: 197 MG/DL
CO2 SERPL-SCNC: 28 MMOL/L (ref 21–32)
COLOR UR: NORMAL
CREAT SERPL-MCNC: 1.24 MG/DL (ref 0.7–1.3)
EPITH CASTS URNS QL MICRO: NORMAL /LPF
ERYTHROCYTE [DISTWIDTH] IN BLOOD BY AUTOMATED COUNT: 12.9 % (ref 11.5–14.5)
EST. AVERAGE GLUCOSE BLD GHB EST-MCNC: 117 MG/DL
GLOBULIN SER CALC-MCNC: 2.8 G/DL (ref 2–4)
GLUCOSE SERPL-MCNC: 123 MG/DL (ref 65–100)
GLUCOSE UR STRIP.AUTO-MCNC: NEGATIVE MG/DL
HBA1C MFR BLD: 5.7 % (ref 4–5.6)
HCT VFR BLD AUTO: 49.7 % (ref 36.6–50.3)
HDLC SERPL-MCNC: 54 MG/DL
HDLC SERPL: 3.6 (ref 0–5)
HGB BLD-MCNC: 16.4 G/DL (ref 12.1–17)
HGB UR QL STRIP: NEGATIVE
HYALINE CASTS URNS QL MICRO: NORMAL /LPF (ref 0–5)
KETONES UR QL STRIP.AUTO: NEGATIVE MG/DL
LDLC SERPL CALC-MCNC: 108.4 MG/DL (ref 0–100)
LEUKOCYTE ESTERASE UR QL STRIP.AUTO: NEGATIVE
MCH RBC QN AUTO: 29.6 PG (ref 26–34)
MCHC RBC AUTO-ENTMCNC: 33 G/DL (ref 30–36.5)
MCV RBC AUTO: 89.7 FL (ref 80–99)
NITRITE UR QL STRIP.AUTO: NEGATIVE
NRBC # BLD: 0 K/UL (ref 0–0.01)
NRBC BLD-RTO: 0 PER 100 WBC
PH UR STRIP: 6 (ref 5–8)
PLATELET # BLD AUTO: 192 K/UL (ref 150–400)
PMV BLD AUTO: 10.6 FL (ref 8.9–12.9)
POTASSIUM SERPL-SCNC: 3.6 MMOL/L (ref 3.5–5.1)
PROT SERPL-MCNC: 6.5 G/DL (ref 6.4–8.2)
PROT UR STRIP-MCNC: NEGATIVE MG/DL
PSA SERPL-MCNC: 1.3 NG/ML (ref 0.01–4)
RBC # BLD AUTO: 5.54 M/UL (ref 4.1–5.7)
RBC #/AREA URNS HPF: NORMAL /HPF (ref 0–5)
SODIUM SERPL-SCNC: 137 MMOL/L (ref 136–145)
SP GR UR REFRACTOMETRY: 1.02 (ref 1–1.03)
T4 FREE SERPL-MCNC: 1.3 NG/DL (ref 0.8–1.5)
TRIGL SERPL-MCNC: 173 MG/DL
TSH SERPL DL<=0.05 MIU/L-ACNC: 1.42 UIU/ML (ref 0.36–3.74)
UROBILINOGEN UR QL STRIP.AUTO: 0.2 EU/DL (ref 0.2–1)
VLDLC SERPL CALC-MCNC: 34.6 MG/DL
WBC # BLD AUTO: 6 K/UL (ref 4.1–11.1)
WBC URNS QL MICRO: NORMAL /HPF (ref 0–4)

## 2024-07-31 PROCEDURE — 90471 IMMUNIZATION ADMIN: CPT | Performed by: FAMILY MEDICINE

## 2024-07-31 PROCEDURE — 90715 TDAP VACCINE 7 YRS/> IM: CPT | Performed by: FAMILY MEDICINE

## 2024-08-04 LAB — DRUGS UR: NORMAL

## 2024-08-20 ENCOUNTER — TELEMEDICINE (OUTPATIENT)
Age: 54
End: 2024-08-20
Payer: COMMERCIAL

## 2024-08-20 ENCOUNTER — TELEPHONE (OUTPATIENT)
Age: 54
End: 2024-08-20

## 2024-08-20 DIAGNOSIS — F98.8 ATTENTION DEFICIT DISORDER (ADD) WITHOUT HYPERACTIVITY: Primary | ICD-10-CM

## 2024-08-20 DIAGNOSIS — F51.01 PRIMARY INSOMNIA: ICD-10-CM

## 2024-08-20 PROCEDURE — 99213 OFFICE O/P EST LOW 20 MIN: CPT | Performed by: FAMILY MEDICINE

## 2024-08-20 RX ORDER — LISDEXAMFETAMINE DIMESYLATE 30 MG/1
30 CAPSULE ORAL EVERY EVENING
Qty: 90 CAPSULE | Refills: 0 | Status: SHIPPED | OUTPATIENT
Start: 2024-08-20 | End: 2024-11-18

## 2024-08-20 RX ORDER — LISDEXAMFETAMINE DIMESYLATE 40 MG/1
40 CAPSULE ORAL DAILY
Qty: 90 CAPSULE | Refills: 0 | Status: SHIPPED | OUTPATIENT
Start: 2024-08-20 | End: 2024-11-18

## 2024-08-20 ASSESSMENT — ENCOUNTER SYMPTOMS
COUGH: 0
NAUSEA: 0
VOMITING: 0
BACK PAIN: 0
SHORTNESS OF BREATH: 0

## 2024-08-20 NOTE — TELEPHONE ENCOUNTER
----- Message from Dr. Tristian Moreno MD sent at 8/20/2024  1:29 PM EDT -----  Regarding: appt  Pt needs to have a VV in 3 months

## 2024-08-20 NOTE — PROGRESS NOTES
[facility]  Virtual Clinic Note    Dean Zafar (:  1970) is a Established patient, presenting virtually for evaluation of the following:      ASSESSMENT & PLAN:    {There are no diagnoses linked to this encounter. (Refresh or delete this SmartLink)}    Assessment & Plan        No follow-ups on file.           SUBJECTIVE:    Dean Zafar is seen today for No chief complaint on file.      History of Present Illness      REVIEW OF SYSYEMS:  Review of Systems         OBJECTIVE:    Patient-Reported Vitals  No data recorded     Physical Exam  [INSTRUCTIONS:  \"[x]\" Indicates a positive item  \"[]\" Indicates a negative item  -- DELETE ALL ITEMS NOT EXAMINED]    Constitutional: [x] Appears well-developed and well-nourished [x] No apparent distress      [] Abnormal -     Mental status: [x] Alert and awake  [x] Oriented to person/place/time [x] Able to follow commands    [] Abnormal -     Eyes:   EOM    [x]  Normal    [] Abnormal -   Sclera  [x]  Normal    [] Abnormal -          Discharge [x]  None visible   [] Abnormal -     HENT: [x] Normocephalic, atraumatic  [] Abnormal -   [x] Mouth/Throat: Mucous membranes are moist    External Ears [x] Normal  [] Abnormal -    Neck: [x] No visualized mass [] Abnormal -     Pulmonary/Chest: [x] Respiratory effort normal   [x] No visualized signs of difficulty breathing or respiratory distress        [] Abnormal -      Musculoskeletal:   [x] Normal gait with no signs of ataxia         [x] Normal range of motion of neck        [] Abnormal -     Neurological:        [x] No Facial Asymmetry (Cranial nerve 7 motor function) (limited exam due to video visit)          [x] No gaze palsy        [] Abnormal -          Skin:        [x] No significant exanthematous lesions or discoloration noted on facial skin         [] Abnormal -            Psychiatric:       [x] Normal Affect [] Abnormal -        [x] No Hallucinations    Other pertinent observable physical exam 
exam findings:-    Assessment & Plan:   Diagnoses and all orders for this visit:    Attention deficit disorder (ADD) without hyperactivity  -     Lisdexamfetamine Dimesylate 40 MG CAPS; Take 40 mg by mouth daily for 90 days. Max Daily Amount: 40 mg  -     lisdexamfetamine (VYVANSE) 30 MG capsule; Take 1 capsule by mouth every evening for 90 days. Max Daily Amount: 30 mg  Pt requests a refill of their medication, which I have granted.     Primary insomnia  Recommended that pt not use electronics close to bedtime.    Follow-up and Dispositions    Return in about 3 months (around 11/20/2024).           We discussed the expected course, resolution and complications of the diagnosis(es) in detail.  Medication risks, benefits, costs, interactions, and alternatives were discussed as indicated.  I advised her to contact the office if her condition worsens, changes or fails to improve as anticipated. She expressed understanding with the diagnosis(es) and plan.     Pursuant to the emergency declaration under the [place] Act and National Emergencies Act, 1135 waiver authority and the Coronavirus Preparedness and Response Supplemental Appropriations Act, this Virtual Visit was conducted, with patient's consent, to reduce the patient's risk of exposure to COVID-19 and provide continuity of care for an established patient.    Services were provided through a video synchronous discussion virtually to substitute for in-person clinic visit.     Kiran GRANDE, am scribing for and in the presence of Tristian Moreno MD. 8/20/24/1:21 PM EDT      Tristian GRANDE MD, personally performed the services described in this documentation as scribed, in my presence, and it is both accurate and complete.        1:18 PM - 1:30 PM    Total time spent with the patient 12 minutes, greater than 50% of time spent counseling patient.

## 2024-09-09 DIAGNOSIS — F98.8 ATTENTION DEFICIT DISORDER (ADD) WITHOUT HYPERACTIVITY: ICD-10-CM

## 2024-09-09 RX ORDER — GUANFACINE 1 MG/1
1 TABLET, EXTENDED RELEASE ORAL NIGHTLY
Qty: 90 TABLET | Refills: 0 | Status: CANCELLED | OUTPATIENT
Start: 2024-09-09

## 2024-09-17 DIAGNOSIS — N40.1 BENIGN PROSTATIC HYPERPLASIA WITH WEAK URINARY STREAM: ICD-10-CM

## 2024-09-17 DIAGNOSIS — N52.9 ERECTILE DYSFUNCTION, UNSPECIFIED ERECTILE DYSFUNCTION TYPE: ICD-10-CM

## 2024-09-17 DIAGNOSIS — R39.12 BENIGN PROSTATIC HYPERPLASIA WITH WEAK URINARY STREAM: ICD-10-CM

## 2024-09-18 RX ORDER — TADALAFIL 5 MG/1
5 TABLET ORAL DAILY
Qty: 90 TABLET | Refills: 1 | OUTPATIENT
Start: 2024-09-18

## 2024-09-25 ENCOUNTER — TELEMEDICINE (OUTPATIENT)
Age: 54
End: 2024-09-25
Payer: COMMERCIAL

## 2024-09-25 DIAGNOSIS — F51.01 PRIMARY INSOMNIA: ICD-10-CM

## 2024-09-25 DIAGNOSIS — F41.9 ANXIETY: Primary | ICD-10-CM

## 2024-09-25 DIAGNOSIS — F98.8 ATTENTION DEFICIT DISORDER (ADD) WITHOUT HYPERACTIVITY: ICD-10-CM

## 2024-09-25 PROCEDURE — 99213 OFFICE O/P EST LOW 20 MIN: CPT | Performed by: FAMILY MEDICINE

## 2024-09-25 RX ORDER — LISDEXAMFETAMINE DIMESYLATE 40 MG/1
40 CAPSULE ORAL DAILY
Qty: 30 CAPSULE | Refills: 0 | Status: SHIPPED | OUTPATIENT
Start: 2024-10-09 | End: 2024-11-08

## 2024-09-25 NOTE — PROGRESS NOTES
indicated.  I advised her to contact the office if her condition worsens, changes or fails to improve as anticipated. She expressed understanding with the diagnosis(es) and plan.     Pursuant to the emergency declaration under the [place] Act and National Emergencies Act, 1135 waiver authority and the Coronavirus Preparedness and Response Supplemental Appropriations Act, this Virtual Visit was conducted, with patient's consent, to reduce the patient's risk of exposure to COVID-19 and provide continuity of care for an established patient.    Services were provided through a video synchronous discussion virtually to substitute for in-person clinic visit.     Loretta GRANDE, tyrese scribing for and in the presence of Tristian Moreno MD. 9/25/24/9:36 AM EDT    Tristian GRANDE MD, personally performed the services described in this documentation as scribed, in my presence, and it is both accurate and complete.      Total time spent with the patient 15 minutes, greater than 50% of time spent counseling patient.

## 2024-10-08 ENCOUNTER — OFFICE VISIT (OUTPATIENT)
Age: 54
End: 2024-10-08
Payer: COMMERCIAL

## 2024-10-08 VITALS
SYSTOLIC BLOOD PRESSURE: 130 MMHG | HEART RATE: 102 BPM | HEIGHT: 69 IN | RESPIRATION RATE: 16 BRPM | DIASTOLIC BLOOD PRESSURE: 87 MMHG | BODY MASS INDEX: 30.36 KG/M2 | WEIGHT: 205 LBS | TEMPERATURE: 97.7 F | OXYGEN SATURATION: 99 %

## 2024-10-08 DIAGNOSIS — G47.9 SLEEP DISTURBANCE: ICD-10-CM

## 2024-10-08 DIAGNOSIS — R42 DIZZINESS: Primary | ICD-10-CM

## 2024-10-08 DIAGNOSIS — R73.03 PREDIABETES: ICD-10-CM

## 2024-10-08 DIAGNOSIS — M48.02 CERVICAL SPINAL STENOSIS: ICD-10-CM

## 2024-10-08 PROCEDURE — 99214 OFFICE O/P EST MOD 30 MIN: CPT | Performed by: FAMILY MEDICINE

## 2024-10-08 RX ORDER — PREDNISONE 10 MG/1
TABLET ORAL
Qty: 30 TABLET | Refills: 0 | Status: SHIPPED | OUTPATIENT
Start: 2024-10-08

## 2024-10-08 RX ORDER — MECLIZINE HYDROCHLORIDE 25 MG/1
25 TABLET ORAL 3 TIMES DAILY PRN
COMMUNITY
Start: 2024-10-08 | End: 2024-10-18

## 2024-10-08 SDOH — ECONOMIC STABILITY: FOOD INSECURITY: WITHIN THE PAST 12 MONTHS, YOU WORRIED THAT YOUR FOOD WOULD RUN OUT BEFORE YOU GOT MONEY TO BUY MORE.: NEVER TRUE

## 2024-10-08 SDOH — ECONOMIC STABILITY: FOOD INSECURITY: WITHIN THE PAST 12 MONTHS, THE FOOD YOU BOUGHT JUST DIDN'T LAST AND YOU DIDN'T HAVE MONEY TO GET MORE.: NEVER TRUE

## 2024-10-08 SDOH — ECONOMIC STABILITY: INCOME INSECURITY: HOW HARD IS IT FOR YOU TO PAY FOR THE VERY BASICS LIKE FOOD, HOUSING, MEDICAL CARE, AND HEATING?: NOT HARD AT ALL

## 2024-10-08 ASSESSMENT — PATIENT HEALTH QUESTIONNAIRE - PHQ9
2. FEELING DOWN, DEPRESSED OR HOPELESS: NOT AT ALL
SUM OF ALL RESPONSES TO PHQ QUESTIONS 1-9: 0
1. LITTLE INTEREST OR PLEASURE IN DOING THINGS: NOT AT ALL
SUM OF ALL RESPONSES TO PHQ9 QUESTIONS 1 & 2: 0
SUM OF ALL RESPONSES TO PHQ QUESTIONS 1-9: 0

## 2024-10-08 NOTE — PROGRESS NOTES
Chief Complaint   Patient presents with    Dizziness   Brain Andre   \"Have you been to the ER, urgent care clinic since your last visit?  Hospitalized since your last visit?\"    [facility] - Bronchitis     “Have you seen or consulted any other health care providers outside our system since your last visit?”    Neck - neck     “Have you had a colorectal cancer screening such as a colonoscopy/FIT/Cologuard?      Yes - Colon rectal spec.     Date of last Colonoscopy: 10/30/2018  No cologuard on file  No FIT/FOBT on file   No flexible sigmoidoscopy on file            
risks/benefits/costs/interactions/alternatives discussed with patient.  Advised patient to call back or return to office if symptoms worsen/change/persist.  If patient cannot reach us or should anything more severe/urgent arise he/she should proceed directly to the nearest emergency department.  Discussed expected course/resolution/complications of diagnosis in detail with patient.    Patient given a written after visit summary which includes diagnoses, current medications and vitals.  Patient expressed understanding with the diagnosis and plan.    Loretta GRANDE, am scribing for and in the presence of Tristian Moreno MD. 10/8/24/9:09 AM EDT      Tristian GRANDE MD, personally performed the services described in this documentation as scribed, in my presence, and it is both accurate and complete.      Total time spent with the patient 20 minutes, greater than 50% of time spent counseling patient.

## 2024-10-09 LAB
ALBUMIN SERPL-MCNC: 4.5 G/DL (ref 3.8–4.9)
ALP SERPL-CCNC: 73 IU/L (ref 44–121)
ALT SERPL-CCNC: 27 IU/L (ref 0–44)
AST SERPL-CCNC: 20 IU/L (ref 0–40)
BASOPHILS # BLD AUTO: 0 X10E3/UL (ref 0–0.2)
BASOPHILS NFR BLD AUTO: 1 %
BILIRUB SERPL-MCNC: 0.7 MG/DL (ref 0–1.2)
BUN SERPL-MCNC: 26 MG/DL (ref 6–24)
BUN/CREAT SERPL: 25 (ref 9–20)
CALCIUM SERPL-MCNC: 9.5 MG/DL (ref 8.7–10.2)
CHLORIDE SERPL-SCNC: 101 MMOL/L (ref 96–106)
CO2 SERPL-SCNC: 24 MMOL/L (ref 20–29)
CREAT SERPL-MCNC: 1.04 MG/DL (ref 0.76–1.27)
EGFRCR SERPLBLD CKD-EPI 2021: 85 ML/MIN/1.73
EOSINOPHIL # BLD AUTO: 0.1 X10E3/UL (ref 0–0.4)
EOSINOPHIL NFR BLD AUTO: 2 %
ERYTHROCYTE [DISTWIDTH] IN BLOOD BY AUTOMATED COUNT: 13.2 % (ref 11.6–15.4)
GLOBULIN SER CALC-MCNC: 2.3 G/DL (ref 1.5–4.5)
GLUCOSE SERPL-MCNC: 100 MG/DL (ref 70–99)
HCT VFR BLD AUTO: 52.1 % (ref 37.5–51)
HGB BLD-MCNC: 16.9 G/DL (ref 13–17.7)
IMM GRANULOCYTES # BLD AUTO: 0 X10E3/UL (ref 0–0.1)
IMM GRANULOCYTES NFR BLD AUTO: 0 %
LYMPHOCYTES # BLD AUTO: 1.3 X10E3/UL (ref 0.7–3.1)
LYMPHOCYTES NFR BLD AUTO: 26 %
MCH RBC QN AUTO: 30.1 PG (ref 26.6–33)
MCHC RBC AUTO-ENTMCNC: 32.4 G/DL (ref 31.5–35.7)
MCV RBC AUTO: 93 FL (ref 79–97)
MONOCYTES # BLD AUTO: 0.5 X10E3/UL (ref 0.1–0.9)
MONOCYTES NFR BLD AUTO: 9 %
NEUTROPHILS # BLD AUTO: 3 X10E3/UL (ref 1.4–7)
NEUTROPHILS NFR BLD AUTO: 62 %
PLATELET # BLD AUTO: 216 X10E3/UL (ref 150–450)
POTASSIUM SERPL-SCNC: 4.1 MMOL/L (ref 3.5–5.2)
PROT SERPL-MCNC: 6.8 G/DL (ref 6–8.5)
RBC # BLD AUTO: 5.61 X10E6/UL (ref 4.14–5.8)
SODIUM SERPL-SCNC: 141 MMOL/L (ref 134–144)
WBC # BLD AUTO: 4.9 X10E3/UL (ref 3.4–10.8)

## 2024-10-13 DIAGNOSIS — F51.01 PRIMARY INSOMNIA: ICD-10-CM

## 2024-10-15 RX ORDER — ZOLPIDEM TARTRATE 5 MG/1
5 TABLET ORAL NIGHTLY PRN
Qty: 15 TABLET | Refills: 0 | OUTPATIENT
Start: 2024-10-15 | End: 2025-01-13

## 2024-10-15 NOTE — TELEPHONE ENCOUNTER
MD Moreno,    Patient is asking for refill of zolpidem.  Also is asking to increase to 10mg as the 5mg is not helping per message:      Patient Comment: I only used 15 pills in 90 days . Can you please send in a refill? I would appreciate the 10 mg dose however as I’m 200 plus pounds and the 5 is not strong enough ! I only take a few a month thank you .    Entered/Updated Dose to 10mg if appropriate.  Check .  Thanks, Vivi    Last appointment: 10/8/24 Josh  Next appointment: 11/20/24 Josh  Previous refill encounter(s): 7/15/24 15 (5mg)    Requested Prescriptions     Pending Prescriptions Disp Refills    zolpidem (AMBIEN) 10 MG tablet 15 tablet 0     Sig: Take 1 tablet by mouth nightly as needed for Sleep for up to 90 days. Max Daily Amount: 10 mg     Refused Prescriptions Disp Refills    zolpidem (AMBIEN) 5 MG tablet 15 tablet 0     Sig: Take 1 tablet by mouth nightly as needed for Sleep for up to 90 days. Max Daily Amount: 5 mg     For Pharmacy Admin Tracking Only    Program: Medication Refill  CPA in place:    Recommendation Provided To:   Intervention Detail: New Rx: 1, reason: Patient Preference  Intervention Accepted By:   Gap Closed?:    Time Spent (min): 5

## 2024-10-17 ENCOUNTER — TELEPHONE (OUTPATIENT)
Age: 54
End: 2024-10-17

## 2024-10-17 DIAGNOSIS — M50.122 CERVICAL DISC DISORDER AT C5-C6 LEVEL WITH RADICULOPATHY: Primary | ICD-10-CM

## 2024-10-17 NOTE — TELEPHONE ENCOUNTER
April from [facility] called provider had order MRI was schedule [facility] was denied patient need to go to [facility] and he is schedule 10/22/2024 at 2:15 pm Need order faxed    Fax # [phone]

## 2024-10-18 RX ORDER — ZOLPIDEM TARTRATE 10 MG/1
10 TABLET ORAL NIGHTLY PRN
Qty: 15 TABLET | Refills: 0 | Status: SHIPPED | OUTPATIENT
Start: 2024-10-18 | End: 2025-01-16

## 2024-10-18 RX ORDER — ZOLPIDEM TARTRATE 5 MG/1
5 TABLET ORAL NIGHTLY PRN
Qty: 15 TABLET | Refills: 0 | OUTPATIENT
Start: 2024-10-18 | End: 2024-11-01

## 2024-10-18 NOTE — TELEPHONE ENCOUNTER
I tried to refill my Ambien but I don’t see it on my chart . As discussed at our last appt I do need something to help me sleep on occasion so can you please call in either a RX for Ambien 10mg or the ER or as I mentioned I have done well on klonipin too . I fully realize you will only prescribe in limited quantities which is fine but I do need something occasionally a few times a month . Thank you

## 2024-10-19 NOTE — RESULT ENCOUNTER NOTE
Your labs are looking good no acute findings noted.    I am concerned with the findings please see the orthopedist and also may need to get a sleep study done.    Also with regards to the sleep and anxiety if you are open to it may be a good idea to see a psychiatrist so we can figure out the right meds for you.    Let me know what your thoughts are

## 2024-10-25 ENCOUNTER — HOSPITAL ENCOUNTER (EMERGENCY)
Facility: HOSPITAL | Age: 54
Discharge: LEFT BEFORE DISCHARGE | End: 2024-10-25
Attending: STUDENT IN AN ORGANIZED HEALTH CARE EDUCATION/TRAINING PROGRAM

## 2024-10-25 VITALS
DIASTOLIC BLOOD PRESSURE: 90 MMHG | HEART RATE: 88 BPM | HEIGHT: 70 IN | OXYGEN SATURATION: 98 % | BODY MASS INDEX: 29.57 KG/M2 | SYSTOLIC BLOOD PRESSURE: 132 MMHG | TEMPERATURE: 98.9 F | RESPIRATION RATE: 18 BRPM | WEIGHT: 206.57 LBS

## 2024-10-25 ASSESSMENT — LIFESTYLE VARIABLES
HOW OFTEN DO YOU HAVE A DRINK CONTAINING ALCOHOL: 4 OR MORE TIMES A WEEK
HOW MANY STANDARD DRINKS CONTAINING ALCOHOL DO YOU HAVE ON A TYPICAL DAY: 1 OR 2

## 2024-10-25 ASSESSMENT — PAIN DESCRIPTION - LOCATION: LOCATION: LEG

## 2024-10-25 ASSESSMENT — PAIN - FUNCTIONAL ASSESSMENT: PAIN_FUNCTIONAL_ASSESSMENT: 0-10

## 2024-10-25 ASSESSMENT — PAIN DESCRIPTION - ORIENTATION: ORIENTATION: RIGHT

## 2024-10-25 ASSESSMENT — PAIN SCALES - GENERAL: PAINLEVEL_OUTOF10: 9

## 2024-10-25 ASSESSMENT — PAIN DESCRIPTION - DESCRIPTORS: DESCRIPTORS: SHARP

## 2024-10-25 NOTE — ED TRIAGE NOTES
Patient arrives with bilateral leg weakness that started on Monday, with loss of sensation in his right foot since Wednesday. States he has sharp pain that radiates from his buttocks down the back of his leg. Denies loss of bowel or bladder. Denies injury.

## 2024-11-01 DIAGNOSIS — F98.8 ATTENTION DEFICIT DISORDER (ADD) WITHOUT HYPERACTIVITY: Primary | ICD-10-CM

## 2024-11-01 RX ORDER — LISDEXAMFETAMINE DIMESYLATE 40 MG/1
40 CAPSULE ORAL DAILY
Qty: 30 CAPSULE | Refills: 0 | Status: CANCELLED | OUTPATIENT
Start: 2024-11-01 | End: 2024-12-01

## 2024-11-04 RX ORDER — LISDEXAMFETAMINE DIMESYLATE 40 MG/1
1 CAPSULE ORAL DAILY
Qty: 30 CAPSULE | Refills: 0 | Status: SHIPPED | OUTPATIENT
Start: 2024-11-04 | End: 2024-12-04

## 2024-11-04 NOTE — TELEPHONE ENCOUNTER
Patient mychart request also stating to send Brand Name please.      Lisdexamfetamine Dimesylate 40 MG CAPS [Dr. Tristian Moreno MD]    Patient Comment: Please call in brand name please     Entered Brand \"FAUSTINO\" Vyvanse 40mg if appropriate.  Check .  Vivi Whiting    Last appointment: 10/8/24 Josh  Next appointment: 11/20/24 Josh  Previous refill encounter(s): 10/9/24 30    Requested Prescriptions     Pending Prescriptions Disp Refills    VYVANSE 40 MG CAPS 30 capsule 0     Sig: Take 1 capsule by mouth daily for 30 days. Max daily dose: 40mg Max Daily Amount: 1 capsule     For Pharmacy Admin Tracking Only    Program: Medication Refill  CPA in place:    Recommendation Provided To:   Intervention Detail: New Rx: 1, reason: Patient Preference  Intervention Accepted By:   Gap Closed?:    Time Spent (min): 5

## 2024-11-18 RX ORDER — TADALAFIL 10 MG/1
5 TABLET ORAL DAILY
Qty: 90 TABLET | Refills: 0 | Status: SHIPPED | OUTPATIENT
Start: 2024-11-18

## 2024-11-20 ENCOUNTER — TELEMEDICINE (OUTPATIENT)
Age: 54
End: 2024-11-20
Payer: COMMERCIAL

## 2024-11-20 DIAGNOSIS — G47.00 INSOMNIA, UNSPECIFIED TYPE: ICD-10-CM

## 2024-11-20 DIAGNOSIS — F98.8 ATTENTION DEFICIT DISORDER (ADD) WITHOUT HYPERACTIVITY: Primary | ICD-10-CM

## 2024-11-20 PROCEDURE — 99213 OFFICE O/P EST LOW 20 MIN: CPT | Performed by: FAMILY MEDICINE

## 2024-11-20 RX ORDER — LISDEXAMFETAMINE DIMESYLATE 40 MG/1
40 CAPSULE ORAL DAILY
Qty: 30 CAPSULE | Refills: 0 | Status: SHIPPED | OUTPATIENT
Start: 2025-01-08 | End: 2025-02-07

## 2024-11-20 RX ORDER — TADALAFIL 5 MG/1
5 TABLET ORAL DAILY
Qty: 90 TABLET | Refills: 1 | Status: CANCELLED | OUTPATIENT
Start: 2024-11-20

## 2024-11-20 RX ORDER — DARIDOREXANT 25 MG/1
1 TABLET, FILM COATED ORAL NIGHTLY PRN
Qty: 30 TABLET | Refills: 0 | Status: SHIPPED | OUTPATIENT
Start: 2024-11-20

## 2024-11-20 RX ORDER — LISDEXAMFETAMINE DIMESYLATE 40 MG/1
40 CAPSULE ORAL DAILY
Qty: 30 CAPSULE | Refills: 0 | Status: SHIPPED | OUTPATIENT
Start: 2025-02-07 | End: 2025-03-09

## 2024-11-20 RX ORDER — LISDEXAMFETAMINE DIMESYLATE 40 MG/1
40 CAPSULE ORAL DAILY
Qty: 30 CAPSULE | Refills: 0 | Status: SHIPPED | OUTPATIENT
Start: 2024-12-09 | End: 2025-01-08

## 2024-11-20 NOTE — PROGRESS NOTES
2024    TELEHEALTH EVALUATION -- Audio/Visual    HPI:    Dena Zafar (:  1970) has requested an audio/video evaluation for the following concern(s):    Dizziness     Pt is followed by Dr. Smallwood for left cervical radiculopathy.   recommended seeing Dr. Rosenbaum for a second opinion   Pt saw neurosurgeon, Dr. Derik Munson on 10/24/24 for leg weakness (R>L) and numbness of right foot. He was found to have right foot drop. Thoracic and lumbar MRIs were ordered.     Pt saw ortho, Dr. Luciano on 10/25/24 for lumbar radiculopathy       Review of Systems    Prior to Visit Medications    Medication Sig Taking? Authorizing Provider   tadalafil (CIALIS) 10 MG tablet Take 0.5 tablets by mouth daily  Leatha Pagan MD   VYVANSE 40 MG CAPS Take 1 capsule by mouth daily for 30 days. Max daily dose: 40mg BRAND NAME ONLY Max Daily Amount: 1 capsule  Tristian Moreno MD   zolpidem (AMBIEN) 10 MG tablet Take 1 tablet by mouth nightly as needed for Sleep for up to 90 days. Max Daily Amount: 10 mg  Tristian Moreno MD   predniSONE (DELTASONE) 10 MG tablet Take 4 tabs by mouth once daily x 3 days, then 3 tabs once daily x 3 days, 2 tabs once daily x 3 days, 1 tab daily x 3 days  Tristian Moreno MD   Lisdexamfetamine Dimesylate 40 MG CAPS Take 40 mg by mouth daily for 30 days. Max Daily Amount: 40 mg  Tristian Moreno MD   guanFACINE (INTUNIV) 1 MG TB24 extended release tablet TAKE 1 TABLET BY MOUTH EVERY NIGHT  Tristian Moreno MD   albuterol sulfate HFA (VENTOLIN HFA) 108 (90 Base) MCG/ACT inhaler Inhale 2 puffs into the lungs 4 times daily as needed for Wheezing  Tata Delgado DO   doxepin (SILENOR) 3 MG TABS tablet Take 1 tablet by mouth nightly  Tristian Moreno MD       Social History     Tobacco Use    Smoking status: Never    Smokeless tobacco: Never   Vaping Use    Vaping status: Never Used   Substance Use Topics    Alcohol use: Not Currently     Alcohol/week: 10.0 standard drinks of alcohol     Types: 
[facility]  Virtual Clinic Note    Dean Zafar (:  1970) is a Established patient, presenting virtually for evaluation of the following:      ASSESSMENT & PLAN:    1. 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 Max Daily Amount: 40 mg, Disp-30 capsule, R-0Normal  -     Lisdexamfetamine Dimesylate 40 MG CAPS; Take 40 mg by mouth daily for 30 days. Max Daily Amount: 40 mg, Disp-30 capsule, R-0Normal  -     Lisdexamfetamine Dimesylate 40 MG CAPS; Take 40 mg by mouth daily for 30 days. Max Daily Amount: 40 mg, Disp-30 capsule, R-0Normal  2. Insomnia, unspecified type  -     Daridorexant HCl (QUVIVIQ) 25 MG TABS; Take 1 tablet by mouth nightly as needed (as needed) Max Daily Amount: 1 tablet, Disp-30 tablet, R-0Normal      Assessment & Plan  1. Postoperative care following laminectomy and discectomy.  The patient is recovering well from surgery performed approximately 9 days ago by Dr. Munson at [facility]. He reports partial return of sensation in his toes and is ambulating without significant issues. No immediate postoperative complications were noted.    2. Insomnia.  The patient continues to experience significant difficulty sleeping despite various non-pharmacological interventions such as cessation of alcohol, magnesium supplementation, and meditation. He reports persistent racing thoughts even when in a good mood. A prescription for Quviviq 25 mg as needed will be provided. He is advised to obtain a discount card online to reduce the cost. If the medication is not effective, he will discontinue its use.    3. Anxiety and depression.  The patient reports ongoing anxiety and occasional depressive symptoms, particularly related to middle age. He expresses interest in potentially starting an SSRI or SNRI in the future, such as Wellbutrin or Effexor, but prefers to discuss this at the next visit. 
carried

## 2024-11-29 ENCOUNTER — PATIENT MESSAGE (OUTPATIENT)
Age: 54
End: 2024-11-29

## 2024-11-29 DIAGNOSIS — K80.20 GALLSTONES: Primary | ICD-10-CM

## 2024-12-04 DIAGNOSIS — F98.8 ATTENTION DEFICIT DISORDER (ADD) WITHOUT HYPERACTIVITY: Primary | ICD-10-CM

## 2024-12-04 NOTE — TELEPHONE ENCOUNTER
Hi Dr Moreno not sure why the 30 mg tabs are not on my refill chart . My refill is due Dec 10 . My insurance will pay for a 90 day supply if you call int to [address] thank you

## 2024-12-05 RX ORDER — LISDEXAMFETAMINE DIMESYLATE 30 MG/1
30 CAPSULE ORAL DAILY
Qty: 90 CAPSULE | Refills: 0 | Status: SHIPPED | OUTPATIENT
Start: 2024-12-10 | End: 2025-03-10

## 2024-12-10 DIAGNOSIS — F98.8 ATTENTION DEFICIT DISORDER (ADD) WITHOUT HYPERACTIVITY: ICD-10-CM

## 2024-12-10 NOTE — TELEPHONE ENCOUNTER
MD Moreno,    Per [facility], patient insurance requires/prefers BRAND Vyvanse.  RX sent 12/5/24 to begin 12/10/24 #90    Updated to FAUSTINO 1 to comply w/insurance.  Please Re-send.   ThanksVivi    Previous refill encounter(s): 12/10/24 #90 (Must be Brand Vyvanse per [facility])    Requested Prescriptions     Pending Prescriptions Disp Refills    VYVANSE 30 MG capsule 90 capsule 0     Sig: Take 1 capsule by mouth daily for 90 days. Max Daily Amount: 30 mg     For Pharmacy Admin Tracking Only    Program: Medication Refill  CPA in place:    Recommendation Provided To:   Intervention Detail: New Rx: 1, reason: Patient Preference  Intervention Accepted By:   Gap Closed?:    Time Spent (min): 5

## 2024-12-11 DIAGNOSIS — F98.8 ATTENTION DEFICIT DISORDER (ADD) WITHOUT HYPERACTIVITY: ICD-10-CM

## 2024-12-12 RX ORDER — LISDEXAMFETAMINE DIMESYLATE 30 MG/1
30 CAPSULE ORAL DAILY
Qty: 90 CAPSULE | Refills: 0 | Status: SHIPPED | OUTPATIENT
Start: 2024-12-12 | End: 2025-03-12

## 2024-12-13 DIAGNOSIS — G47.00 INSOMNIA, UNSPECIFIED TYPE: ICD-10-CM

## 2024-12-17 NOTE — TELEPHONE ENCOUNTER
Patient mychart request for refill:  Stating working great with No Side Effects. Check .  Thanks, Vivi    Last appointment: VV 11/20/24 Josh  Next appointment: None  Previous refill encounter(s): 11/20/24 30    Requested Prescriptions     Pending Prescriptions Disp Refills    Daridorexant HCl (QUVIVIQ) 25 MG TABS 30 tablet 0     Sig: Take 1 tablet by mouth nightly as needed (as needed) Max Daily Amount: 1 tablet     For Pharmacy Admin Tracking Only    Program: Medication Refill  CPA in place:    Recommendation Provided To:   Intervention Detail: New Rx: 1, reason: Patient Preference  Intervention Accepted By:   Gap Closed?:    Time Spent (min): 5

## 2024-12-18 DIAGNOSIS — F98.8 ATTENTION DEFICIT DISORDER (ADD) WITHOUT HYPERACTIVITY: Primary | ICD-10-CM

## 2024-12-18 DIAGNOSIS — G47.00 INSOMNIA, UNSPECIFIED TYPE: ICD-10-CM

## 2024-12-18 RX ORDER — LISDEXAMFETAMINE DIMESYLATE 40 MG/1
1 CAPSULE ORAL DAILY
Qty: 90 CAPSULE | Refills: 0 | Status: SHIPPED | OUTPATIENT
Start: 2024-12-18 | End: 2025-03-18

## 2024-12-18 RX ORDER — LISDEXAMFETAMINE DIMESYLATE 40 MG/1
1 CAPSULE ORAL DAILY
Qty: 30 CAPSULE | Refills: 0 | Status: SHIPPED | OUTPATIENT
Start: 2025-01-08 | End: 2024-12-18

## 2024-12-18 RX ORDER — DARIDOREXANT 25 MG/1
1 TABLET, FILM COATED ORAL NIGHTLY PRN
Qty: 30 TABLET | Refills: 0 | Status: CANCELLED | OUTPATIENT
Start: 2024-12-18

## 2024-12-19 DIAGNOSIS — G47.00 INSOMNIA, UNSPECIFIED TYPE: ICD-10-CM

## 2024-12-19 RX ORDER — DARIDOREXANT 25 MG/1
1 TABLET, FILM COATED ORAL NIGHTLY PRN
Qty: 30 TABLET | Refills: 2 | Status: SHIPPED | OUTPATIENT
Start: 2024-12-19

## 2024-12-19 RX ORDER — DARIDOREXANT 25 MG/1
1 TABLET, FILM COATED ORAL NIGHTLY PRN
Qty: 30 TABLET | Refills: 5 | OUTPATIENT
Start: 2024-12-19

## 2025-01-08 ENCOUNTER — PATIENT MESSAGE (OUTPATIENT)
Age: 55
End: 2025-01-08

## 2025-01-08 DIAGNOSIS — F98.8 ATTENTION DEFICIT DISORDER (ADD) WITHOUT HYPERACTIVITY: ICD-10-CM

## 2025-01-09 RX ORDER — LISDEXAMFETAMINE DIMESYLATE 40 MG/1
1 CAPSULE ORAL DAILY
Qty: 90 CAPSULE | Refills: 0 | OUTPATIENT
Start: 2025-01-09 | End: 2025-04-09

## 2025-01-09 NOTE — TELEPHONE ENCOUNTER
Too soon to refill -     Lisdexamfetamine Dimesylate (VYVANSE) 40 MG CAPS [[id number]]    Order Details  Dose: 1 capsule Route: Oral Frequency: DAILY   Dispense Quantity: 90 capsule Refills: 0    Note to Pharmacy: Name brand only         Sig: Take 1 capsule by mouth daily for 90 days. 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 - P [phone] - F [phone]  [address] [place] [address]  Phone: [phone]  Fax: [phone]     PCP: Tristian Moreno MD    Last appt: 11/20/2024   Future Appointments   Date Time Provider Department Center   1/30/2025  1:40 PM Cindy Duong PA [facility]       Requested Prescriptions     Pending Prescriptions Disp Refills    Lisdexamfetamine Dimesylate (VYVANSE) 40 MG CAPS 90 capsule 0     Sig: Take 1 capsule by mouth daily for 90 days. Max Daily Amount: 1 capsule         Prior labs and Blood pressures:  BP Readings from Last 3 Encounters:   10/25/24 (!) 132/90   10/08/24 130/87   05/28/24 118/80     Lab Results   Component Value Date/Time     10/08/2024 12:00 AM    K 4.1 10/08/2024 12:00 AM     10/08/2024 12:00 AM    CO2 24 10/08/2024 12:00 AM    BUN 26 10/08/2024 12:00 AM    GFRAA >60 10/26/2021 10:08 AM     No results found for: \"HBA1C\", \"[id number]\"  Lab Results   Component Value Date/Time    CHOL 197 07/31/2024 10:45 AM    HDL 54 07/31/2024 10:45 AM    .4 07/31/2024 10:45 AM    .2 12/12/2022 09:26 AM    VLDL 34.6 07/31/2024 10:45 AM     No results found for: \"VITD3\"    Lab Results   Component Value Date/Time    TSH 1.42 07/31/2024

## 2025-01-09 NOTE — TELEPHONE ENCOUNTER
Hi Dr Moreno I requested a refill as the pharmacy could not do 90 pills last month so I need a new 30 day Rx starting today !! Can you call in BRAND name not generic thank you

## 2025-01-12 DIAGNOSIS — G47.00 INSOMNIA, UNSPECIFIED TYPE: ICD-10-CM

## 2025-01-13 ENCOUNTER — CLINICAL DOCUMENTATION (OUTPATIENT)
Age: 55
End: 2025-01-13

## 2025-01-13 NOTE — PROGRESS NOTES
Appointment Request From: Dean Zafar      With Provider: DONAVON Green [[facility]]      Preferred Date Range: 5/1/2025 - 8/29/2025      Preferred Times: Any Time      Reason for visit: Request an Appointment      Comments:   Please schedule fibroscan and cancel my January appt thank you     Appointment Request  (Newest Message First)  Dean Hunter [facility]  StaffYesterday (12:54 AM)       Appointment Request From: Dean Zafar     With Provider: DONAVON Green [[facility]]     Preferred Date Range: 5/1/2025 - 8/29/2025     Preferred Times: Any Time     Reason for visit: Request an Appointment     Comments:  Please schedule fibroscan and cancel my January appt thank you

## 2025-01-14 RX ORDER — LISDEXAMFETAMINE DIMESYLATE 40 MG/1
1 CAPSULE ORAL DAILY
Qty: 31 CAPSULE | Refills: 0 | Status: SHIPPED | OUTPATIENT
Start: 2025-01-19 | End: 2025-02-20

## 2025-01-14 RX ORDER — DOXEPIN 3 MG/1
3 TABLET, FILM COATED ORAL NIGHTLY
Qty: 30 TABLET | Refills: 0 | Status: SHIPPED | OUTPATIENT
Start: 2025-01-14

## 2025-01-14 NOTE — TELEPHONE ENCOUNTER
PCP: Tristian Moreno MD    Last appt: 11/20/2024   Future Appointments   Date Time Provider Department Center   1/30/2025  1:40 PM Cindy Duong PA [facility]       Requested Prescriptions     Pending Prescriptions Disp Refills    doxepin (SILENOR) 3 MG TABS tablet 30 tablet 5     Sig: Take 1 tablet by mouth nightly         Prior labs and Blood pressures:  BP Readings from Last 3 Encounters:   10/25/24 (!) 132/90   10/08/24 130/87   05/28/24 118/80     Lab Results   Component Value Date/Time     10/08/2024 12:00 AM    K 4.1 10/08/2024 12:00 AM     10/08/2024 12:00 AM    CO2 24 10/08/2024 12:00 AM    BUN 26 10/08/2024 12:00 AM    GFRAA >60 10/26/2021 10:08 AM     No results found for: \"HBA1C\", \"[id number]\"  Lab Results   Component Value Date/Time    CHOL 197 07/31/2024 10:45 AM    HDL 54 07/31/2024 10:45 AM    .4 07/31/2024 10:45 AM    .2 12/12/2022 09:26 AM    VLDL 34.6 07/31/2024 10:45 AM     No results found for: \"VITD3\"    Lab Results   Component Value Date/Time    TSH 1.42 07/31/2024 10:45 AM

## 2025-01-17 SDOH — ECONOMIC STABILITY: TRANSPORTATION INSECURITY
IN THE PAST 12 MONTHS, HAS LACK OF TRANSPORTATION KEPT YOU FROM MEETINGS, WORK, OR FROM GETTING THINGS NEEDED FOR DAILY LIVING?: NO

## 2025-01-17 SDOH — ECONOMIC STABILITY: TRANSPORTATION INSECURITY
IN THE PAST 12 MONTHS, HAS THE LACK OF TRANSPORTATION KEPT YOU FROM MEDICAL APPOINTMENTS OR FROM GETTING MEDICATIONS?: NO

## 2025-01-17 SDOH — ECONOMIC STABILITY: INCOME INSECURITY: IN THE LAST 12 MONTHS, WAS THERE A TIME WHEN YOU WERE NOT ABLE TO PAY THE MORTGAGE OR RENT ON TIME?: NO

## 2025-01-17 SDOH — ECONOMIC STABILITY: FOOD INSECURITY: WITHIN THE PAST 12 MONTHS, THE FOOD YOU BOUGHT JUST DIDN'T LAST AND YOU DIDN'T HAVE MONEY TO GET MORE.: NEVER TRUE

## 2025-01-17 SDOH — ECONOMIC STABILITY: FOOD INSECURITY: WITHIN THE PAST 12 MONTHS, YOU WORRIED THAT YOUR FOOD WOULD RUN OUT BEFORE YOU GOT MONEY TO BUY MORE.: NEVER TRUE

## 2025-01-20 ENCOUNTER — TELEMEDICINE (OUTPATIENT)
Age: 55
End: 2025-01-20
Payer: COMMERCIAL

## 2025-01-20 DIAGNOSIS — F98.8 ATTENTION DEFICIT DISORDER (ADD) WITHOUT HYPERACTIVITY: Primary | ICD-10-CM

## 2025-01-20 DIAGNOSIS — R53.82 CHRONIC FATIGUE: ICD-10-CM

## 2025-01-20 DIAGNOSIS — R73.03 PREDIABETES: ICD-10-CM

## 2025-01-20 DIAGNOSIS — Z13.220 LIPID SCREENING: ICD-10-CM

## 2025-01-20 DIAGNOSIS — F41.9 ANXIETY: ICD-10-CM

## 2025-01-20 DIAGNOSIS — N40.0 BENIGN PROSTATIC HYPERPLASIA WITHOUT LOWER URINARY TRACT SYMPTOMS: ICD-10-CM

## 2025-01-20 DIAGNOSIS — G47.00 INSOMNIA, UNSPECIFIED TYPE: ICD-10-CM

## 2025-01-20 PROCEDURE — 99214 OFFICE O/P EST MOD 30 MIN: CPT | Performed by: FAMILY MEDICINE

## 2025-01-20 RX ORDER — HYDRALAZINE HYDROCHLORIDE 25 MG/1
25 TABLET, FILM COATED ORAL 3 TIMES DAILY PRN
Qty: 90 TABLET | Refills: 3 | Status: SHIPPED | OUTPATIENT
Start: 2025-01-20

## 2025-01-20 ASSESSMENT — PATIENT HEALTH QUESTIONNAIRE - PHQ9
5. POOR APPETITE OR OVEREATING: NOT AT ALL
3. TROUBLE FALLING OR STAYING ASLEEP: NEARLY EVERY DAY
SUM OF ALL RESPONSES TO PHQ QUESTIONS 1-9: 2
7. TROUBLE CONCENTRATING ON THINGS, SUCH AS READING THE NEWSPAPER OR WATCHING TELEVISION: NOT AT ALL
1. LITTLE INTEREST OR PLEASURE IN DOING THINGS: SEVERAL DAYS
SUM OF ALL RESPONSES TO PHQ QUESTIONS 1-9: 2
1. LITTLE INTEREST OR PLEASURE IN DOING THINGS: SEVERAL DAYS
SUM OF ALL RESPONSES TO PHQ QUESTIONS 1-9: 7
SUM OF ALL RESPONSES TO PHQ QUESTIONS 1-9: 2
2. FEELING DOWN, DEPRESSED OR HOPELESS: SEVERAL DAYS
2. FEELING DOWN, DEPRESSED OR HOPELESS: NOT AT ALL
SUM OF ALL RESPONSES TO PHQ QUESTIONS 1-9: 0
SUM OF ALL RESPONSES TO PHQ QUESTIONS 1-9: 0
8. MOVING OR SPEAKING SO SLOWLY THAT OTHER PEOPLE COULD HAVE NOTICED. OR THE OPPOSITE, BEING SO FIGETY OR RESTLESS THAT YOU HAVE BEEN MOVING AROUND A LOT MORE THAN USUAL: NOT AT ALL
SUM OF ALL RESPONSES TO PHQ QUESTIONS 1-9: 0
4. FEELING TIRED OR HAVING LITTLE ENERGY: MORE THAN HALF THE DAYS
10. IF YOU CHECKED OFF ANY PROBLEMS, HOW DIFFICULT HAVE THESE PROBLEMS MADE IT FOR YOU TO DO YOUR WORK, TAKE CARE OF THINGS AT HOME, OR GET ALONG WITH OTHER PEOPLE: NOT DIFFICULT AT ALL
1. LITTLE INTEREST OR PLEASURE IN DOING THINGS: NOT AT ALL
SUM OF ALL RESPONSES TO PHQ9 QUESTIONS 1 & 2: 2
SUM OF ALL RESPONSES TO PHQ QUESTIONS 1-9: 0
SUM OF ALL RESPONSES TO PHQ QUESTIONS 1-9: 7
2. FEELING DOWN, DEPRESSED OR HOPELESS: SEVERAL DAYS
SUM OF ALL RESPONSES TO PHQ QUESTIONS 1-9: 2
SUM OF ALL RESPONSES TO PHQ9 QUESTIONS 1 & 2: 2
SUM OF ALL RESPONSES TO PHQ QUESTIONS 1-9: 7
9. THOUGHTS THAT YOU WOULD BE BETTER OFF DEAD, OR OF HURTING YOURSELF: NOT AT ALL
6. FEELING BAD ABOUT YOURSELF - OR THAT YOU ARE A FAILURE OR HAVE LET YOURSELF OR YOUR FAMILY DOWN: NOT AT ALL
SUM OF ALL RESPONSES TO PHQ9 QUESTIONS 1 & 2: 0
SUM OF ALL RESPONSES TO PHQ QUESTIONS 1-9: 7

## 2025-01-20 ASSESSMENT — LIFESTYLE VARIABLES
HAVE YOU OR SOMEONE ELSE BEEN INJURED AS A RESULT OF YOUR DRINKING: NO
HOW OFTEN DURING THE LAST YEAR HAVE YOU NEEDED AN ALCOHOLIC DRINK FIRST THING IN THE MORNING TO GET YOURSELF GOING AFTER A NIGHT OF HEAVY DRINKING: NEVER
HOW OFTEN DURING THE LAST YEAR HAVE YOU FAILED TO DO WHAT WAS NORMALLY EXPECTED FROM YOU BECAUSE OF DRINKING: NEVER
HOW OFTEN DO YOU HAVE A DRINK CONTAINING ALCOHOL: 2-3 TIMES A WEEK
HAS A RELATIVE, FRIEND, DOCTOR, OR ANOTHER HEALTH PROFESSIONAL EXPRESSED CONCERN ABOUT YOUR DRINKING OR SUGGESTED YOU CUT DOWN: NO
HOW OFTEN DURING THE LAST YEAR HAVE YOU HAD A FEELING OF GUILT OR REMORSE AFTER DRINKING: NEVER
HOW OFTEN DURING THE LAST YEAR HAVE YOU BEEN UNABLE TO REMEMBER WHAT HAPPENED THE NIGHT BEFORE BECAUSE YOU HAD BEEN DRINKING: NEVER
HOW MANY STANDARD DRINKS CONTAINING ALCOHOL DO YOU HAVE ON A TYPICAL DAY: 3 OR 4
HOW OFTEN DURING THE LAST YEAR HAVE YOU FOUND THAT YOU WERE NOT ABLE TO STOP DRINKING ONCE YOU HAD STARTED: NEVER

## 2025-01-20 NOTE — PROGRESS NOTES
2025    TELEHEALTH EVALUATION -- Audio/Visual    HPI:    Dean Zafar (:  1970) has requested an audio/video evaluation for the following concern(s):    Pt reports he doesn't feel as anxious as he normally does, which he finds unusual. He reports worsening fatigue and lack of motivation. He has trouble getting out of bed until his Vyvanse starts working. The Vyvanse only helps for a few hours. He reports decreased interest in activities he loves (like metal detecting). He reports significant brain fog. He denies issues with long term memory, but has some minor concerns with his short term memory. He would like to rule out physical issues that could be causing his fatigue.     He reports often feeling SOB on exertion (worse since his surgery). His father has history of heart disease. Stress echo in  was normal and EKG in  was normal.     He had recent sleep study which did not show sleep apnea. He reports Quviviq 25mg helped, but not enough so he increased the dose. The increased dose helped, but then he was groggy in the morning.     He also has increased mucus and wheezing which he believes is due to reflux. He started taking Prilosec and Zantac, which has helped but unfortunately makes him more tired.     His last A1c was 5.7 on 24. He would like to get his A1c checked again to rule out fatigue based on glucose levels. He denies polydipsia or polyuria.     He has noticed weakened urine stream and hesitancy. He restarted cialis 5mg daily which has helped. PSA was 1.3 on 24. He gets up maybe once during the night to use the bathroom.     He typically has 10 drinks in a week. He has tried cutting out alcohol before and reports his head felt clearer, but was still feeling anxious and tired.       Review of Systems   Constitutional:  Positive for fatigue. Negative for chills and fever.   HENT:  Negative for hearing loss and tinnitus.    Eyes:  Negative for visual disturbance.

## 2025-01-20 NOTE — PROGRESS NOTES
Chief Complaint   Patient presents with    Anxiety    Medication Adjustment     \"Have you been to the ER, urgent care clinic since your last visit?  Hospitalized since your last visit?\"        “Have you seen or consulted any other health care providers outside our system since your last visit?”      Neurology

## 2025-02-04 DIAGNOSIS — F51.01 PRIMARY INSOMNIA: ICD-10-CM

## 2025-02-06 RX ORDER — DOXEPIN HYDROCHLORIDE 10 MG/1
10 CAPSULE ORAL NIGHTLY
Qty: 30 CAPSULE | Refills: 3 | Status: SHIPPED | OUTPATIENT
Start: 2025-02-06

## 2025-02-13 DIAGNOSIS — E55.9 VITAMIN D DEFICIENCY: Primary | ICD-10-CM

## 2025-02-13 DIAGNOSIS — R53.82 CHRONIC FATIGUE: ICD-10-CM

## 2025-02-23 ENCOUNTER — PATIENT MESSAGE (OUTPATIENT)
Age: 55
End: 2025-02-23

## 2025-02-23 DIAGNOSIS — F90.0 ATTENTION DEFICIT HYPERACTIVITY DISORDER (ADHD), PREDOMINANTLY INATTENTIVE TYPE: Primary | ICD-10-CM

## 2025-02-25 RX ORDER — LISDEXAMFETAMINE DIMESYLATE 30 MG/1
30 CAPSULE ORAL DAILY
Qty: 90 CAPSULE | Refills: 0 | Status: SHIPPED | OUTPATIENT
Start: 2025-03-08 | End: 2025-06-06

## 2025-02-26 ENCOUNTER — PATIENT MESSAGE (OUTPATIENT)
Age: 55
End: 2025-02-26

## 2025-02-27 DIAGNOSIS — F98.8 ATTENTION DEFICIT DISORDER (ADD) WITHOUT HYPERACTIVITY: ICD-10-CM

## 2025-02-27 RX ORDER — SCOPOLAMINE 1 MG/3D
1 PATCH, EXTENDED RELEASE TRANSDERMAL
Qty: 2 PATCH | Refills: 0 | Status: SHIPPED | OUTPATIENT
Start: 2025-02-27

## 2025-02-28 NOTE — TELEPHONE ENCOUNTER
PCP: Tristian Moreno MD    Last appt: 1/20/2025   Future Appointments   Date Time Provider Department Center   4/3/2025  3:00 PM Cindy Duong PA [facility]       Requested Prescriptions     Pending Prescriptions Disp Refills    VYVANSE 40 MG CAPS 31 capsule 0     Sig: Take 1 capsule by mouth daily for 32 days. Max Daily Amount: 1 capsule         Prior labs and Blood pressures:  BP Readings from Last 3 Encounters:   10/25/24 (!) 132/90   10/08/24 130/87   05/28/24 118/80     Lab Results   Component Value Date/Time     10/08/2024 12:00 AM    K 4.1 10/08/2024 12:00 AM     10/08/2024 12:00 AM    CO2 24 10/08/2024 12:00 AM    BUN 26 10/08/2024 12:00 AM    GFRAA >60 10/26/2021 10:08 AM     No results found for: \"HBA1C\", \"[id number]\"  Lab Results   Component Value Date/Time    CHOL 197 07/31/2024 10:45 AM    HDL 54 07/31/2024 10:45 AM    .4 07/31/2024 10:45 AM    .2 12/12/2022 09:26 AM    VLDL 34.6 07/31/2024 10:45 AM     No results found for: \"VITD3\"    Lab Results   Component Value Date/Time    TSH 1.42 07/31/2024 10:45 AM

## 2025-03-03 RX ORDER — LISDEXAMFETAMINE DIMESYLATE 40 MG/1
CAPSULE ORAL
Qty: 90 CAPSULE | Refills: 0 | Status: SHIPPED | OUTPATIENT
Start: 2025-03-03 | End: 2025-06-01

## 2025-03-05 ENCOUNTER — PATIENT MESSAGE (OUTPATIENT)
Age: 55
End: 2025-03-05

## 2025-03-05 DIAGNOSIS — F51.01 PRIMARY INSOMNIA: Primary | ICD-10-CM

## 2025-03-11 RX ORDER — ZOLPIDEM TARTRATE 10 MG/1
10 TABLET ORAL NIGHTLY PRN
Qty: 10 TABLET | Refills: 0 | Status: SHIPPED | OUTPATIENT
Start: 2025-03-11 | End: 2025-03-21

## 2025-03-25 NOTE — TELEPHONE ENCOUNTER
Patient LOV: 1/20/2025 with Dr. Moreno  Next OV: Visit date not found   Please send to pharmacy on file.

## 2025-03-26 DIAGNOSIS — F98.8 ATTENTION DEFICIT DISORDER (ADD) WITHOUT HYPERACTIVITY: ICD-10-CM

## 2025-03-26 RX ORDER — TADALAFIL 10 MG/1
5 TABLET ORAL DAILY
Qty: 90 TABLET | Refills: 0 | Status: SHIPPED | OUTPATIENT
Start: 2025-03-26

## 2025-04-04 ENCOUNTER — PATIENT MESSAGE (OUTPATIENT)
Age: 55
End: 2025-04-04

## 2025-04-04 DIAGNOSIS — F98.8 ATTENTION DEFICIT DISORDER (ADD) WITHOUT HYPERACTIVITY: ICD-10-CM

## 2025-04-04 NOTE — TELEPHONE ENCOUNTER
PCP: Tristian Moreno MD    Last appt: 1/20/2025       No future appointments.    Requested Prescriptions     Pending Prescriptions Disp Refills    VYVANSE 40 MG CAPS 90 capsule 0     Sig: Take 1 capsule daily       Prior labs and Blood pressures:  BP Readings from Last 3 Encounters:   10/25/24 (!) 132/90   10/08/24 130/87   05/28/24 118/80     Lab Results   Component Value Date/Time     10/08/2024 12:00 AM    K 4.1 10/08/2024 12:00 AM     10/08/2024 12:00 AM    CO2 24 10/08/2024 12:00 AM    BUN 26 10/08/2024 12:00 AM    GFRAA >60 10/26/2021 10:08 AM     No results found for: \"HBA1C\", \"[id number]\"  Lab Results   Component Value Date/Time    CHOL 197 07/31/2024 10:45 AM    HDL 54 07/31/2024 10:45 AM    .4 07/31/2024 10:45 AM    .2 12/12/2022 09:26 AM    VLDL 34.6 07/31/2024 10:45 AM     No results found for: \"VITD3\"    Lab Results   Component Value Date/Time    TSH 1.42 07/31/2024 10:45 AM

## 2025-04-08 DIAGNOSIS — E55.9 VITAMIN D DEFICIENCY: ICD-10-CM

## 2025-04-08 DIAGNOSIS — R73.03 PREDIABETES: ICD-10-CM

## 2025-04-08 DIAGNOSIS — R53.82 CHRONIC FATIGUE: ICD-10-CM

## 2025-04-08 RX ORDER — LISDEXAMFETAMINE DIMESYLATE 40 MG/1
CAPSULE ORAL
Qty: 30 CAPSULE | Refills: 0 | Status: SHIPPED | OUTPATIENT
Start: 2025-04-08 | End: 2025-07-03

## 2025-04-08 RX ORDER — LISDEXAMFETAMINE DIMESYLATE 40 MG/1
CAPSULE ORAL
Qty: 30 CAPSULE | Refills: 0 | Status: SHIPPED | OUTPATIENT
Start: 2025-05-07 | End: 2025-06-06

## 2025-04-09 LAB
ALBUMIN SERPL-MCNC: 4.3 G/DL (ref 3.8–4.9)
ALP SERPL-CCNC: 75 IU/L (ref 44–121)
ALT SERPL-CCNC: 32 IU/L (ref 0–44)
AST SERPL-CCNC: 22 IU/L (ref 0–40)
BILIRUB SERPL-MCNC: 0.4 MG/DL (ref 0–1.2)
BUN SERPL-MCNC: 30 MG/DL (ref 6–24)
BUN/CREAT SERPL: 29 (ref 9–20)
CALCIUM SERPL-MCNC: 9.1 MG/DL (ref 8.7–10.2)
CHLORIDE SERPL-SCNC: 103 MMOL/L (ref 96–106)
CHOLEST SERPL-MCNC: 167 MG/DL (ref 100–199)
CO2 SERPL-SCNC: 22 MMOL/L (ref 20–29)
CREAT SERPL-MCNC: 1.04 MG/DL (ref 0.76–1.27)
EGFRCR SERPLBLD CKD-EPI 2021: 85 ML/MIN/1.73
GLOBULIN SER CALC-MCNC: 2.2 G/DL (ref 1.5–4.5)
GLUCOSE SERPL-MCNC: 105 MG/DL (ref 70–99)
HBA1C MFR BLD: 5.5 % (ref 4.8–5.6)
HDLC SERPL-MCNC: 46 MG/DL
IMP & REVIEW OF LAB RESULTS: NORMAL
LDLC SERPL CALC-MCNC: 103 MG/DL (ref 0–99)
POTASSIUM SERPL-SCNC: 4.1 MMOL/L (ref 3.5–5.2)
PROT SERPL-MCNC: 6.5 G/DL (ref 6–8.5)
SODIUM SERPL-SCNC: 141 MMOL/L (ref 134–144)
SPECIMEN STATUS REPORT: NORMAL
TRIGL SERPL-MCNC: 97 MG/DL (ref 0–149)
VLDLC SERPL CALC-MCNC: 18 MG/DL (ref 5–40)

## 2025-04-10 LAB — 25(OH)D3+25(OH)D2 SERPL-MCNC: 39.5 NG/ML (ref 30–100)

## 2025-04-14 ENCOUNTER — RESULTS FOLLOW-UP (OUTPATIENT)
Age: 55
End: 2025-04-14

## 2025-05-07 DIAGNOSIS — F98.8 ATTENTION DEFICIT DISORDER (ADD) WITHOUT HYPERACTIVITY: ICD-10-CM

## 2025-05-07 NOTE — TELEPHONE ENCOUNTER
PCP: Tristian Moreno MD    Last appt: 1/20/2025       Future Appointments   Date Time Provider Department Center   8/12/2025 11:00 AM Cindy Duong PA [facility]       Requested Prescriptions     Pending Prescriptions Disp Refills    VYVANSE 40 MG CAPS 30 capsule 0     Sig: Take 1 capsule daily       Prior labs and Blood pressures:  BP Readings from Last 3 Encounters:   10/25/24 (!) 132/90   10/08/24 130/87   05/28/24 118/80     Lab Results   Component Value Date/Time     04/08/2025 12:00 AM    K 4.1 04/08/2025 12:00 AM     04/08/2025 12:00 AM    CO2 22 04/08/2025 12:00 AM    BUN 30 04/08/2025 12:00 AM    GFRAA >60 10/26/2021 10:08 AM     No results found for: \"HBA1C\", \"[id number]\"  Lab Results   Component Value Date/Time    CHOL 167 04/08/2025 12:00 AM    HDL 46 04/08/2025 12:00 AM     04/08/2025 12:00 AM    .2 12/12/2022 09:26 AM    VLDL 18 04/08/2025 12:00 AM     No results found for: \"VITD3\"    Lab Results   Component Value Date/Time    TSH 1.42 07/31/2024 10:45 AM

## 2025-05-13 DIAGNOSIS — F98.8 ATTENTION DEFICIT DISORDER (ADD) WITHOUT HYPERACTIVITY: ICD-10-CM

## 2025-05-13 DIAGNOSIS — F51.01 PRIMARY INSOMNIA: ICD-10-CM

## 2025-05-13 RX ORDER — GUANFACINE 1 MG/1
1 TABLET, EXTENDED RELEASE ORAL NIGHTLY
Qty: 90 TABLET | Refills: 0 | Status: CANCELLED | OUTPATIENT
Start: 2025-05-13

## 2025-05-13 RX ORDER — LISDEXAMFETAMINE DIMESYLATE 40 MG/1
CAPSULE ORAL
Qty: 30 CAPSULE | Refills: 0 | Status: SHIPPED | OUTPATIENT
Start: 2025-05-13 | End: 2025-06-06

## 2025-05-14 NOTE — TELEPHONE ENCOUNTER
Patient requesting via [employer] requests.  Thanks, Vivi    Last appointment: VV 1/20/25 Josh   Next appointment: None  Previous refill encounter(s):   Doxepin 2/6/25 30 + 3  Intuniv 7/15/24 90     Requested Prescriptions     Pending Prescriptions Disp Refills    Doxepin HCl 6 MG TABS 90 tablet 1     Sig: Take 1 tablet by mouth nightly    guanFACINE (INTUNIV) 1 MG TB24 extended release tablet 90 tablet 1     Sig: Take 1 tablet by mouth nightly     For Pharmacy Admin Tracking Only    Program: Medication Refill  CPA in place:    Recommendation Provided To:   Intervention Detail: New Rx: 2, reason: Patient Preference  Intervention Accepted By:   Gap Closed?:    Time Spent (min): 5

## 2025-05-15 RX ORDER — DOXEPIN 6 MG/1
1 TABLET, FILM COATED ORAL
Qty: 90 TABLET | Refills: 1 | Status: SHIPPED | OUTPATIENT
Start: 2025-05-15

## 2025-05-15 RX ORDER — GUANFACINE 1 MG/1
1 TABLET, EXTENDED RELEASE ORAL NIGHTLY
Qty: 90 TABLET | Refills: 1 | Status: SHIPPED | OUTPATIENT
Start: 2025-05-15

## 2025-06-03 ENCOUNTER — TELEMEDICINE (OUTPATIENT)
Age: 55
End: 2025-06-03
Payer: COMMERCIAL

## 2025-06-03 DIAGNOSIS — F90.0 ATTENTION DEFICIT HYPERACTIVITY DISORDER (ADHD), PREDOMINANTLY INATTENTIVE TYPE: Primary | ICD-10-CM

## 2025-06-03 DIAGNOSIS — F51.01 PRIMARY INSOMNIA: ICD-10-CM

## 2025-06-03 PROCEDURE — 99213 OFFICE O/P EST LOW 20 MIN: CPT | Performed by: FAMILY MEDICINE

## 2025-06-03 RX ORDER — LISDEXAMFETAMINE DIMESYLATE 40 MG/1
40 CAPSULE ORAL DAILY
Qty: 30 CAPSULE | Refills: 0 | Status: SHIPPED | OUTPATIENT
Start: 2025-08-05 | End: 2025-09-04

## 2025-06-03 RX ORDER — LISDEXAMFETAMINE DIMESYLATE 40 MG/1
40 CAPSULE ORAL DAILY
Qty: 30 CAPSULE | Refills: 0 | Status: SHIPPED | OUTPATIENT
Start: 2025-06-06 | End: 2025-07-06

## 2025-06-03 RX ORDER — RAMELTEON 8 MG/1
8 TABLET ORAL NIGHTLY
Qty: 30 TABLET | Refills: 2 | Status: SHIPPED | OUTPATIENT
Start: 2025-06-03 | End: 2025-09-01

## 2025-06-03 RX ORDER — LISDEXAMFETAMINE DIMESYLATE 30 MG/1
30 CAPSULE ORAL DAILY
Qty: 90 CAPSULE | Refills: 0 | Status: SHIPPED | OUTPATIENT
Start: 2025-06-10 | End: 2025-09-08

## 2025-06-03 RX ORDER — LISDEXAMFETAMINE DIMESYLATE 40 MG/1
40 CAPSULE ORAL DAILY
Qty: 30 CAPSULE | Refills: 0 | Status: SHIPPED | OUTPATIENT
Start: 2025-07-06 | End: 2025-08-05

## 2025-06-03 ASSESSMENT — ENCOUNTER SYMPTOMS
SHORTNESS OF BREATH: 0
ABDOMINAL PAIN: 0

## 2025-06-03 NOTE — PROGRESS NOTES
[facility]  Virtual Clinic Note    Dean Zafar (:  1970) is a Established patient, presenting virtually for evaluation of the following:      ASSESSMENT & PLAN:    1. Attention deficit hyperactivity disorder (ADHD), predominantly inattentive type  -     lisdexamfetamine (VYVANSE) 30 MG capsule; Take 1 capsule by mouth daily for 90 days. Max Daily Amount: 30 mg, Disp-90 capsule, R-0Normal  -     Lisdexamfetamine Dimesylate 40 MG CAPS; Take 40 mg by mouth daily for 30 days. Max Daily Amount: 40 mg, Disp-30 capsule, R-0Normal  -     Lisdexamfetamine Dimesylate 40 MG CAPS; Take 40 mg by mouth daily for 30 days. Max Daily Amount: 40 mg, Disp-30 capsule, R-0Normal  -     Lisdexamfetamine Dimesylate 40 MG CAPS; Take 40 mg by mouth daily for 30 days. Max Daily Amount: 40 mg, Disp-30 capsule, R-0Normal  2. Primary insomnia  -     ramelteon (ROZEREM) 8 MG tablet; Take 1 tablet by mouth nightly, Disp-30 tablet, R-2Normal      Assessment & Plan  1. Insomnia.  - Reports difficulty both initiating and maintaining sleep, resulting in only 3-4 hours of fragmented sleep per night.  - Has tried various treatments including doxepin, Ambien, magnesium, taurine, CBD gummies, and Lunesta, with limited success.  - Ramelteon 8 mg has been prescribed to be taken 30 minutes before bedtime, avoiding fatty meals to ensure efficacy.  - Advised to try this medication for a month to evaluate its effectiveness.    2. Medication management.  - Currently taking Vyvanse 30 mg in the morning and 40 mg approximately 3 hours later.  - Prescription for a 90-day supply of Vyvanse 30 mg will be sent to pharmacy.  - Monthly prescription for Vyvanse 40 mg will be provided.  - Prescription Drug Monitoring Program was reviewed.      No follow-ups on file.           SUBJECTIVE:    Dean Zafar is seen today for No chief complaint on file.      History of Present Illness  The patient presents via virtual visit for evaluation

## 2025-06-11 DIAGNOSIS — F98.8 ATTENTION DEFICIT DISORDER (ADD) WITHOUT HYPERACTIVITY: ICD-10-CM

## 2025-06-11 RX ORDER — GUANFACINE 1 MG/1
1 TABLET, EXTENDED RELEASE ORAL DAILY
Qty: 30 TABLET | Refills: 2 | Status: SHIPPED | OUTPATIENT
Start: 2025-06-11

## 2025-06-11 NOTE — TELEPHONE ENCOUNTER
PCP: Tristian Moreno MD    Last appt: 6/3/2025     Future Appointments   Date Time Provider Department Center   8/12/2025 11:00 AM Cindy Duong PA [facility]       Requested Prescriptions     Pending Prescriptions Disp Refills    guanFACINE (INTUNIV) 1 MG TB24 extended release tablet [Pharmacy Med Name: GUANFACINE ER 1MG TABLETS] 30 tablet      Sig: TAKE 1 TABLET BY MOUTH DAILY       Prior labs and Blood pressures:  BP Readings from Last 3 Encounters:   10/25/24 (!) 132/90   10/08/24 130/87   05/28/24 118/80     Lab Results   Component Value Date/Time     04/08/2025 12:00 AM    K 4.1 04/08/2025 12:00 AM     04/08/2025 12:00 AM    CO2 22 04/08/2025 12:00 AM    BUN 30 04/08/2025 12:00 AM    GFRAA >60 10/26/2021 10:08 AM     No results found for: \"HBA1C\", \"[id number]\"  Lab Results   Component Value Date/Time    CHOL 167 04/08/2025 12:00 AM    HDL 46 04/08/2025 12:00 AM     04/08/2025 12:00 AM    .2 12/12/2022 09:26 AM    VLDL 18 04/08/2025 12:00 AM     No results found for: \"VITD3\"    Lab Results   Component Value Date/Time    TSH 1.42 07/31/2024 10:45 AM

## 2025-06-27 DIAGNOSIS — F51.01 PRIMARY INSOMNIA: ICD-10-CM

## 2025-06-28 RX ORDER — TADALAFIL 10 MG/1
5 TABLET ORAL DAILY
Qty: 30 TABLET | Refills: 0 | Status: SHIPPED | OUTPATIENT
Start: 2025-06-28

## 2025-06-28 RX ORDER — ZOLPIDEM TARTRATE 10 MG/1
TABLET ORAL
Qty: 10 TABLET | Refills: 0 | Status: SHIPPED | OUTPATIENT
Start: 2025-06-28 | End: 2025-07-08

## 2025-07-16 RX ORDER — ALPRAZOLAM 1 MG/1
TABLET ORAL
Qty: 10 TABLET | OUTPATIENT
Start: 2025-07-16

## 2025-07-22 DIAGNOSIS — F51.01 PRIMARY INSOMNIA: ICD-10-CM

## 2025-07-23 RX ORDER — RAMELTEON 8 MG/1
8 TABLET ORAL NIGHTLY
Qty: 90 TABLET | Refills: 1 | Status: SHIPPED | OUTPATIENT
Start: 2025-07-23

## 2025-07-23 NOTE — TELEPHONE ENCOUNTER
PCP: Tristian Moreno MD    Last appt: 6/3/2025     Future Appointments   Date Time Provider Department Center   8/12/2025 11:00 AM Cindy Duong PA [facility]       Requested Prescriptions     Pending Prescriptions Disp Refills    ramelteon (ROZEREM) 8 MG tablet [Pharmacy Med Name: RAMELTEON 8 MG TABLET] 90 tablet 1     Sig: TAKE 1 TABLET BY MOUTH EVERY DAY AT NIGHT       Prior labs and Blood pressures:  BP Readings from Last 3 Encounters:   10/25/24 (!) 132/90   10/08/24 130/87   05/28/24 118/80     Lab Results   Component Value Date/Time     04/08/2025 12:00 AM    K 4.1 04/08/2025 12:00 AM     04/08/2025 12:00 AM    CO2 22 04/08/2025 12:00 AM    BUN 30 04/08/2025 12:00 AM    GFRAA >60 10/26/2021 10:08 AM     No results found for: \"HBA1C\", \"[id number]\"  Lab Results   Component Value Date/Time    CHOL 167 04/08/2025 12:00 AM    HDL 46 04/08/2025 12:00 AM     04/08/2025 12:00 AM    .2 12/12/2022 09:26 AM    VLDL 18 04/08/2025 12:00 AM     No results found for: \"VITD3\"    Lab Results   Component Value Date/Time    TSH 1.42 07/31/2024 10:45 AM

## 2025-08-18 DIAGNOSIS — F90.0 ATTENTION DEFICIT HYPERACTIVITY DISORDER (ADHD), PREDOMINANTLY INATTENTIVE TYPE: ICD-10-CM

## 2025-08-18 DIAGNOSIS — F98.8 ATTENTION DEFICIT DISORDER (ADD) WITHOUT HYPERACTIVITY: ICD-10-CM

## 2025-08-19 RX ORDER — LISDEXAMFETAMINE DIMESYLATE 40 MG/1
CAPSULE ORAL
Qty: 30 CAPSULE | Refills: 0 | Status: SHIPPED | OUTPATIENT
Start: 2025-08-19 | End: 2025-09-11

## 2025-08-19 RX ORDER — LISDEXAMFETAMINE DIMESYLATE 30 MG/1
30 CAPSULE ORAL DAILY
Qty: 90 CAPSULE | Refills: 0 | Status: SHIPPED | OUTPATIENT
Start: 2025-08-19 | End: 2025-11-17

## 2025-09-02 ENCOUNTER — TELEMEDICINE (OUTPATIENT)
Age: 55
End: 2025-09-02
Payer: COMMERCIAL

## 2025-09-02 DIAGNOSIS — R10.84 GENERALIZED ABDOMINAL PAIN: ICD-10-CM

## 2025-09-02 DIAGNOSIS — R30.0 DYSURIA: ICD-10-CM

## 2025-09-02 DIAGNOSIS — K21.9 GASTROESOPHAGEAL REFLUX DISEASE WITHOUT ESOPHAGITIS: ICD-10-CM

## 2025-09-02 DIAGNOSIS — R63.4 WEIGHT LOSS: ICD-10-CM

## 2025-09-02 DIAGNOSIS — F51.01 PRIMARY INSOMNIA: Primary | ICD-10-CM

## 2025-09-02 DIAGNOSIS — F98.8 ATTENTION DEFICIT DISORDER (ADD) WITHOUT HYPERACTIVITY: ICD-10-CM

## 2025-09-02 DIAGNOSIS — R14.0 BLOATED ABDOMEN: ICD-10-CM

## 2025-09-02 DIAGNOSIS — F41.9 ANXIETY: ICD-10-CM

## 2025-09-02 PROCEDURE — 99214 OFFICE O/P EST MOD 30 MIN: CPT | Performed by: FAMILY MEDICINE

## 2025-09-02 RX ORDER — TRAZODONE HYDROCHLORIDE 50 MG/1
50 TABLET ORAL NIGHTLY
Qty: 30 TABLET | Refills: 5 | Status: SHIPPED | OUTPATIENT
Start: 2025-09-02

## 2025-09-02 RX ORDER — ESOMEPRAZOLE MAGNESIUM 40 MG/1
40 CAPSULE, DELAYED RELEASE ORAL DAILY
Qty: 30 CAPSULE | Refills: 1 | Status: SHIPPED | OUTPATIENT
Start: 2025-09-02

## 2025-09-02 ASSESSMENT — ENCOUNTER SYMPTOMS
ABDOMINAL PAIN: 0
SHORTNESS OF BREATH: 0

## 2025-09-03 ENCOUNTER — HOSPITAL ENCOUNTER (OUTPATIENT)
Facility: HOSPITAL | Age: 55
Discharge: HOME | End: 2025-09-06
Payer: COMMERCIAL

## 2025-09-03 DIAGNOSIS — R14.0 BLOATED ABDOMEN: ICD-10-CM

## 2025-09-03 PROCEDURE — 76700 US EXAM ABDOM COMPLETE: CPT

## 2025-09-04 ENCOUNTER — OFFICE VISIT (OUTPATIENT)
Age: 55
End: 2025-09-04

## 2025-09-04 VITALS
OXYGEN SATURATION: 99 % | BODY MASS INDEX: 28.6 KG/M2 | TEMPERATURE: 97.4 F | HEIGHT: 70 IN | SYSTOLIC BLOOD PRESSURE: 118 MMHG | HEART RATE: 83 BPM | WEIGHT: 199.8 LBS | DIASTOLIC BLOOD PRESSURE: 80 MMHG

## 2025-09-04 DIAGNOSIS — K76.0 FATTY (CHANGE OF) LIVER, NOT ELSEWHERE CLASSIFIED: Primary | ICD-10-CM

## 2025-09-04 ASSESSMENT — LIFESTYLE VARIABLES
HOW OFTEN DO YOU HAVE A DRINK CONTAINING ALCOHOL: NEVER
HOW MANY STANDARD DRINKS CONTAINING ALCOHOL DO YOU HAVE ON A TYPICAL DAY: PATIENT DOES NOT DRINK

## 2025-09-04 ASSESSMENT — PATIENT HEALTH QUESTIONNAIRE - PHQ9
SUM OF ALL RESPONSES TO PHQ QUESTIONS 1-9: 0
DEPRESSION UNABLE TO ASSESS: FUNCTIONAL CAPACITY MOTIVATION LIMITS ACCURACY
SUM OF ALL RESPONSES TO PHQ QUESTIONS 1-9: 0
2. FEELING DOWN, DEPRESSED OR HOPELESS: NOT AT ALL
1. LITTLE INTEREST OR PLEASURE IN DOING THINGS: NOT AT ALL
SUM OF ALL RESPONSES TO PHQ QUESTIONS 1-9: 0
SUM OF ALL RESPONSES TO PHQ QUESTIONS 1-9: 0

## 2025-09-05 ENCOUNTER — PATIENT MESSAGE (OUTPATIENT)
Age: 55
End: 2025-09-05

## 2025-09-05 DIAGNOSIS — R16.0 LIVER MASS: Primary | ICD-10-CM
